# Patient Record
Sex: FEMALE | Race: BLACK OR AFRICAN AMERICAN | Employment: FULL TIME | URBAN - METROPOLITAN AREA
[De-identification: names, ages, dates, MRNs, and addresses within clinical notes are randomized per-mention and may not be internally consistent; named-entity substitution may affect disease eponyms.]

---

## 2017-01-11 ENCOUNTER — OFFICE VISIT (OUTPATIENT)
Dept: FAMILY MEDICINE CLINIC | Age: 22
End: 2017-01-11

## 2017-01-11 VITALS
DIASTOLIC BLOOD PRESSURE: 61 MMHG | OXYGEN SATURATION: 100 % | HEIGHT: 63 IN | HEART RATE: 71 BPM | TEMPERATURE: 97.9 F | BODY MASS INDEX: 18.78 KG/M2 | WEIGHT: 106 LBS | RESPIRATION RATE: 16 BRPM | SYSTOLIC BLOOD PRESSURE: 123 MMHG

## 2017-01-11 DIAGNOSIS — R59.0 ADENOPATHY, CERVICAL: Primary | ICD-10-CM

## 2017-01-11 RX ORDER — SULFAMETHOXAZOLE AND TRIMETHOPRIM 800; 160 MG/1; MG/1
1 TABLET ORAL 2 TIMES DAILY
Qty: 20 TAB | Refills: 0 | Status: SHIPPED | OUTPATIENT
Start: 2017-01-11 | End: 2017-01-21

## 2017-01-11 NOTE — MR AVS SNAPSHOT
Visit Information Date & Time Provider Department Dept. Phone Encounter #  
 1/11/2017  2:15 PM Claude Marrero MD [address] [place] [address] [id number] Follow-up Instructions Return if symptoms worsen or fail to improve. Upcoming Health Maintenance Date Due Hepatitis A Peds Age 1-18 (1 of 2 - Standard Series) 1/29/1997 DTaP/Tdap/Td series (1 - Tdap) 1/29/2003 HPV AGE 9Y-26Y (1 of 3 - Female 3 Dose Series) 1/29/2007 INFLUENZA AGE 9 TO ADULT 8/1/2016 Allergies as of 1/11/2017  Review Complete On: 1/11/2017 By: Mark Garcia LPN Severity Noted Reaction Type Reactions Penicillins  05/06/2015    Hives Current Immunizations  Reviewed on 12/3/2016 No immunizations on file. Not reviewed this visit You Were Diagnosed With   
  
 Codes Comments Adenopathy, cervical    -  Primary ICD-10-CM: R59.0 ICD-9-CM: 923. 6 Vitals BP Pulse Temp Resp Height(growth percentile) Weight(growth percentile) 123/61 (BP 1 Location: Right arm, BP Patient Position: Sitting) 71 97.9 °F (36.6 °C) (Oral) 16 5' 3\" (1.6 m) 106 lb (48.1 kg) LMP SpO2 BMI OB Status Smoking Status 12/20/2016 100% 18.78 kg/m2 Having regular periods Never Smoker BMI and BSA Data Body Mass Index Body Surface Area 18.78 kg/m 2 1.46 m 2 Preferred Pharmacy Pharmacy Name Phone [facility] [address] [place] [address] [phone] Your Updated Medication List  
  
   
This list is accurate as of: 1/11/17  3:17 PM.  Always use your most recent med list.  
  
  
  
  
 trimethoprim-sulfamethoxazole 160-800 mg per tablet Commonly known as:  BACTRIM DS, SEPTRA DS Take 1 Tab by mouth two (2) times a day for 10 days. Prescriptions Sent to Pharmacy  Refills  
 trimethoprim-sulfamethoxazole (BACTRIM DS, SEPTRA DS) 160-800 mg per tablet 0  
 Sig: Take 1 Tab by mouth two (2) times a day for 10 days. Class: Normal  
 Pharmacy: [id number], [address] [place] [address] #: [phone] Route: Oral  
  
Follow-up Instructions Return if symptoms worsen or fail to improve. Introducing [facility]! Dear Robert Ritchie: Thank you for requesting a Zipscene account. Our records indicate that you already have an active Zipscene account. You can access your account anytime at [email or web address] Did you know that you can access your hospital and ER discharge instructions at any time in Zipscene? You can also review all of your test results from your hospital stay or ER visit. Additional Information If you have questions, please visit the Frequently Asked Questions section of the Zipscene website at [email or web address] Remember, Zipscene is NOT to be used for urgent needs. For medical emergencies, dial 911. Now available from your iPhone and Android! Please provide this summary of care documentation to your next provider. Your primary care clinician is listed as Claudy Bravo. If you have any questions after today's visit, please call [phone].

## 2017-01-11 NOTE — PROGRESS NOTES
1. Have you been to the ER, urgent care clinic since your last visit? Hospitalized since your last visit? No    2. Have you seen or consulted any other health care providers outside of the [address] [place] [address] since your last visit? Include any pap smears or colon screening.  No     Chief Complaint   Patient presents with    Mass     on neck left side

## 2017-01-11 NOTE — PROGRESS NOTES
Chief Complaint   Patient presents with    Mass     on neck left side     she is a 22y.o. year old female who presents for evalution. she is here with a new problem. She noted a mass on the left side of neck last night. This hurt off and on and made worse by turning right. Reviewed PmHx, RxHx, FmHx, SocHx, AllgHx and updated and dated in the chart. Patient Active Problem List    Diagnosis    Acute cystitis without hematuria    Hypokalemia    Hypoglycemia    Anxiety       Nurse notes were reviewed and copied and are correct  Review of Systems - negative except as listed above in the HPI    Objective:     Vitals:    01/11/17 1444   BP: 123/61   Pulse: 71   Resp: 16   Temp: 97.9 °F (36.6 °C)   TempSrc: Oral   SpO2: 100%   Weight: 106 lb (48.1 kg)   Height: 5' 3\" (1.6 m)       Physical Examination: General appearance - alert, well appearing, and in no distress  Mental status - alert, oriented to person, place, and time  Eyes - pupils equal and reactive, extraocular eye movements intact  Ears - bilateral TM's and external ear canals normal  Mouth - mucous membranes moist, pharynx normal without lesions  Neck - supple, tender ant cervical lymph node on the left. Skin: few very tiny pin head sized acne pustules on the neck and upper clavicle area    Assessment/ Plan:   Partha Pool was seen today for mass. Diagnoses and all orders for this visit:    Adenopathy, cervical  -     trimethoprim-sulfamethoxazole (BACTRIM DS, SEPTRA DS) 160-800 mg per tablet; Take 1 Tab by mouth two (2) times a day for 10 days. possibly reacting to the pustules in the area. Also possible is a tooth issue althoug they look fine. I suggest she see the dentist soon  Follow-up Disposition:  Return if symptoms worsen or fail to improve. ICD-10-CM ICD-9-CM    1.  Adenopathy, cervical R59.0 785.6 trimethoprim-sulfamethoxazole (BACTRIM DS, SEPTRA DS) 160-800 mg per tablet       I have discussed the diagnosis with the patient and the intended plan as seen in the above orders. The patient has received an after-visit summary and questions were answered concerning future plans. Medication Side Effects and Warnings were discussed with patient: yes  Patient Labs were reviewed and or requested: yes  Patient Past Records were reviewed and or requested: yes        There are no Patient Instructions on file for this visit.     The patient verbalizes understanding and agrees with the plan of care        Patient has the advanced directives booklet to review

## 2017-01-25 ENCOUNTER — HOSPITAL ENCOUNTER (OUTPATIENT)
Dept: ULTRASOUND IMAGING | Age: 22
Discharge: HOME | End: 2017-01-25
Attending: INTERNAL MEDICINE
Payer: COMMERCIAL

## 2017-01-25 DIAGNOSIS — N17.9 ACUTE KIDNEY FAILURE, UNSPECIFIED (HCC): ICD-10-CM

## 2017-01-25 PROCEDURE — 76770 US EXAM ABDO BACK WALL COMP: CPT

## 2017-01-28 ENCOUNTER — APPOINTMENT (OUTPATIENT)
Dept: CT IMAGING | Age: 22
End: 2017-01-28
Attending: PHYSICIAN ASSISTANT
Payer: COMMERCIAL

## 2017-01-28 ENCOUNTER — HOSPITAL ENCOUNTER (EMERGENCY)
Age: 22
Discharge: HOME | End: 2017-01-28
Attending: EMERGENCY MEDICINE | Admitting: EMERGENCY MEDICINE
Payer: COMMERCIAL

## 2017-01-28 VITALS
DIASTOLIC BLOOD PRESSURE: 88 MMHG | TEMPERATURE: 98.1 F | RESPIRATION RATE: 12 BRPM | HEART RATE: 68 BPM | BODY MASS INDEX: 18.52 KG/M2 | SYSTOLIC BLOOD PRESSURE: 141 MMHG | HEIGHT: 63 IN | OXYGEN SATURATION: 100 % | WEIGHT: 104.5 LBS

## 2017-01-28 DIAGNOSIS — G43.109 MIGRAINE WITH AURA AND WITHOUT STATUS MIGRAINOSUS, NOT INTRACTABLE: Primary | ICD-10-CM

## 2017-01-28 LAB
ALBUMIN SERPL BCP-MCNC: 4.2 G/DL (ref 3.5–5)
ALBUMIN/GLOB SERPL: 1.2 {RATIO} (ref 1.1–2.2)
ALP SERPL-CCNC: 31 U/L (ref 45–117)
ALT SERPL-CCNC: 13 U/L (ref 12–78)
ANION GAP BLD CALC-SCNC: 7 MMOL/L (ref 5–15)
AST SERPL W P-5'-P-CCNC: 11 U/L (ref 15–37)
BASOPHILS # BLD AUTO: 0 K/UL (ref 0–0.1)
BASOPHILS # BLD: 1 % (ref 0–1)
BILIRUB SERPL-MCNC: 0.7 MG/DL (ref 0.2–1)
BUN SERPL-MCNC: 9 MG/DL (ref 6–20)
BUN/CREAT SERPL: 11 (ref 12–20)
CALCIUM SERPL-MCNC: 8.8 MG/DL (ref 8.5–10.1)
CHLORIDE SERPL-SCNC: 107 MMOL/L (ref 97–108)
CO2 SERPL-SCNC: 28 MMOL/L (ref 21–32)
CREAT SERPL-MCNC: 0.85 MG/DL (ref 0.55–1.02)
EOSINOPHIL # BLD: 0.1 K/UL (ref 0–0.4)
EOSINOPHIL NFR BLD: 2 % (ref 0–7)
ERYTHROCYTE [DISTWIDTH] IN BLOOD BY AUTOMATED COUNT: 12 % (ref 11.5–14.5)
GLOBULIN SER CALC-MCNC: 3.6 G/DL (ref 2–4)
GLUCOSE SERPL-MCNC: 86 MG/DL (ref 65–100)
HCG UR QL: NEGATIVE
HCT VFR BLD AUTO: 34.3 % (ref 35–47)
HGB BLD-MCNC: 11.8 G/DL (ref 11.5–16)
LYMPHOCYTES # BLD AUTO: 51 % (ref 12–49)
LYMPHOCYTES # BLD: 3.1 K/UL (ref 0.8–3.5)
MCH RBC QN AUTO: 30.6 PG (ref 26–34)
MCHC RBC AUTO-ENTMCNC: 34.4 G/DL (ref 30–36.5)
MCV RBC AUTO: 88.9 FL (ref 80–99)
MONOCYTES # BLD: 0.5 K/UL (ref 0–1)
MONOCYTES NFR BLD AUTO: 8 % (ref 5–13)
NEUTS SEG # BLD: 2.3 K/UL (ref 1.8–8)
NEUTS SEG NFR BLD AUTO: 38 % (ref 32–75)
PLATELET # BLD AUTO: 217 K/UL (ref 150–400)
POTASSIUM SERPL-SCNC: 3.6 MMOL/L (ref 3.5–5.1)
PROT SERPL-MCNC: 7.8 G/DL (ref 6.4–8.2)
RBC # BLD AUTO: 3.86 M/UL (ref 3.8–5.2)
SODIUM SERPL-SCNC: 142 MMOL/L (ref 136–145)
WBC # BLD AUTO: 6 K/UL (ref 3.6–11)

## 2017-01-28 PROCEDURE — 74011250636 HC RX REV CODE- 250/636: Performed by: EMERGENCY MEDICINE

## 2017-01-28 PROCEDURE — 85025 COMPLETE CBC W/AUTO DIFF WBC: CPT | Performed by: PHYSICIAN ASSISTANT

## 2017-01-28 PROCEDURE — 36415 COLL VENOUS BLD VENIPUNCTURE: CPT | Performed by: PHYSICIAN ASSISTANT

## 2017-01-28 PROCEDURE — 96375 TX/PRO/DX INJ NEW DRUG ADDON: CPT

## 2017-01-28 PROCEDURE — 99283 EMERGENCY DEPT VISIT LOW MDM: CPT

## 2017-01-28 PROCEDURE — 80053 COMPREHEN METABOLIC PANEL: CPT | Performed by: PHYSICIAN ASSISTANT

## 2017-01-28 PROCEDURE — 70450 CT HEAD/BRAIN W/O DYE: CPT

## 2017-01-28 PROCEDURE — 96361 HYDRATE IV INFUSION ADD-ON: CPT

## 2017-01-28 PROCEDURE — 96374 THER/PROPH/DIAG INJ IV PUSH: CPT

## 2017-01-28 PROCEDURE — 81025 URINE PREGNANCY TEST: CPT

## 2017-01-28 RX ORDER — KETOROLAC TROMETHAMINE 30 MG/ML
30 INJECTION, SOLUTION INTRAMUSCULAR; INTRAVENOUS
Status: DISCONTINUED | OUTPATIENT
Start: 2017-01-28 | End: 2017-01-28

## 2017-01-28 RX ORDER — PROCHLORPERAZINE EDISYLATE 5 MG/ML
10 INJECTION INTRAMUSCULAR; INTRAVENOUS
Status: COMPLETED | OUTPATIENT
Start: 2017-01-28 | End: 2017-01-28

## 2017-01-28 RX ORDER — DEXAMETHASONE SODIUM PHOSPHATE 100 MG/10ML
10 INJECTION INTRAMUSCULAR; INTRAVENOUS
Status: DISCONTINUED | OUTPATIENT
Start: 2017-01-28 | End: 2017-01-28

## 2017-01-28 RX ORDER — BUTALBITAL, ACETAMINOPHEN AND CAFFEINE 300; 40; 50 MG/1; MG/1; MG/1
1 CAPSULE ORAL
Qty: 30 CAP | Refills: 0 | Status: SHIPPED | OUTPATIENT
Start: 2017-01-28 | End: 2017-07-19

## 2017-01-28 RX ORDER — DIPHENHYDRAMINE HYDROCHLORIDE 50 MG/ML
25 INJECTION, SOLUTION INTRAMUSCULAR; INTRAVENOUS
Status: COMPLETED | OUTPATIENT
Start: 2017-01-28 | End: 2017-01-28

## 2017-01-28 RX ADMIN — DIPHENHYDRAMINE HYDROCHLORIDE 25 MG: 50 INJECTION, SOLUTION INTRAMUSCULAR; INTRAVENOUS at 19:56

## 2017-01-28 RX ADMIN — SODIUM CHLORIDE 1000 ML: 900 INJECTION, SOLUTION INTRAVENOUS at 19:56

## 2017-01-28 RX ADMIN — PROCHLORPERAZINE EDISYLATE 10 MG: 5 INJECTION INTRAMUSCULAR; INTRAVENOUS at 19:56

## 2017-01-29 NOTE — DISCHARGE INSTRUCTIONS
Migraine Headache: Care Instructions  Your Care Instructions  Migraines are painful, throbbing headaches that often start on one side of the head. They may cause nausea and vomiting and make you sensitive to light, sound, or smell. Without treatment, migraines can last from 4 hours to a few days. Medicines can help prevent migraines or stop them after they have started. Your doctor can help you find which ones work best for you. Follow-up care is a key part of your treatment and safety. Be sure to make and go to all appointments, and call your doctor if you are having problems. It's also a good idea to know your test results and keep a list of the medicines you take. How can you care for yourself at home? · Do not drive if you have taken a prescription pain medicine. · Rest in a quiet, dark room until your headache is gone. Close your eyes, and try to relax or go to sleep. Don't watch TV or read. · Put a cold, moist cloth or cold pack on the painful area for 10 to 20 minutes at a time. Put a thin cloth between the cold pack and your skin. · Use a warm, moist towel or a heating pad set on low to relax tight shoulder and neck muscles. · Have someone gently massage your neck and shoulders. · Take your medicines exactly as prescribed. Call your doctor if you think you are having a problem with your medicine. You will get more details on the specific medicines your doctor prescribes. · Be careful not to take pain medicine more often than the instructions allow. You could get worse or more frequent headaches when the medicine wears off. To prevent migraines  · Keep a headache diary so you can figure out what triggers your headaches. Avoiding triggers may help you prevent headaches. Record when each headache began, how long it lasted, and what the pain was like.  (Was it throbbing, aching, stabbing, or dull?) Write down any other symptoms you had with the headache, such as nausea, flashing lights or dark spots, or sensitivity to bright light or loud noise. Note if the headache occurred near your period. List anything that might have triggered the headache. Triggers may include certain foods (chocolate, cheese, wine) or odors, smoke, bright light, stress, or lack of sleep. · If your doctor has prescribed medicine for your migraines, take it as directed. You may have medicine that you take only when you get a migraine and medicine that you take all the time to help prevent migraines. ¨ If your doctor has prescribed medicine for when you get a headache, take it at the first sign of a migraine, unless your doctor has given you other instructions. ¨ If your doctor has prescribed medicine to prevent migraines, take it exactly as prescribed. Call your doctor if you think you are having a problem with your medicine. · Find healthy ways to deal with stress. Migraines are most common during or right after stressful times. Take time to relax before and after you do something that has caused a migraine in the past.  · Try to keep your muscles relaxed by keeping good posture. Check your jaw, face, neck, and shoulder muscles for tension. Try to relax them. When you sit at a desk, change positions often. And make sure to stretch for 30 seconds each hour. · Get plenty of sleep and exercise. · Eat meals on a regular schedule. Avoid foods and drinks that often trigger migraines. These include chocolate, alcohol (especially red wine and port), aspartame, monosodium glutamate (MSG), and some additives found in foods (such as hot dogs, baez, cold cuts, aged cheeses, and pickled foods). · Limit caffeine. Don't drink too much coffee, tea, or soda. But don't quit caffeine suddenly. That can also give you migraines. · Do not smoke or allow others to smoke around you. If you need help quitting, talk to your doctor about stop-smoking programs and medicines. These can increase your chances of quitting for good.   · If you are taking birth control pills or hormone therapy, talk to your doctor about whether they are triggering your migraines. When should you call for help? Call 911 anytime you think you may need emergency care. For example, call if:  · You have signs of a stroke. These may include:  ¨ Sudden numbness, paralysis, or weakness in your face, arm, or leg, especially on only one side of your body. ¨ Sudden vision changes. ¨ Sudden trouble speaking. ¨ Sudden confusion or trouble understanding simple statements. ¨ Sudden problems with walking or balance. ¨ A sudden, severe headache that is different from past headaches. Call your doctor now or seek immediate medical care if:  · You have new or worse nausea and vomiting. · You have a new or higher fever. · Your headache gets much worse. Watch closely for changes in your health, and be sure to contact your doctor if:  · You are not getting better after 2 days (48 hours). Where can you learn more? Go to http://robbie-zehra.info/. Enter G232 in the search box to learn more about \"Migraine Headache: Care Instructions. \"  Current as of: February 19, 2016  Content Version: 11.1  © 6446-5512 [employer]. Care instructions adapted under license by [employer] (which disclaims liability or warranty for this information). If you have questions about a medical condition or this instruction, always ask your healthcare professional. [address] any warranty or liability for your use of this information.

## 2017-01-29 NOTE — ED PROVIDER NOTES
HPI Comments: Kasandra Kennedy is a 22 y.o. female, pmhx significant for hypoglycemia, anxiety, panic disorder, migraines, GERD, hypokalemia, who presents ambulatory to the ED c/o gradually worsening 8/10 L frontal HA with associated lightheadedness, blurry vision of the L eye, photophobia of the L eye, nausea and L sided weakness. Pt describes HA as pulsating and notes that her lightheadedness worsens upon standing. She reports that this HA does not feel similar to ones that she has had in the past and mentions that she has medication for migraines, but has not taken it since the onset of her sxs. Sleep and OTC tylenol partially alleviate the pt's sxs. Pt has been tolerating nl PO throughout the course of her sxs. Pt specifically denies vomiting, fever, rhinorrhea, cough, runny eyes, dysuria, hematuria, phonophobia or seeing a neurologist.     PCP: Claude Marrero MD      Social Hx: -tobacco, -EtOH, -Illicit Drugs   FHx: no pertinent family hx   Medication Allergies: penicillins      There are no other complaints, changes, or physical findings at this time. The history is provided by the patient. Past Medical History:   Diagnosis Date    Endocrine disease      hypoglycemia    Gastrointestinal disorder      GERD    Hypokalemia     Panic attacks     Psychiatric disorder      anxiety, panic disorder       No past surgical history on file. Family History:   Problem Relation Age of Onset   [facility] Migraines Mother        Social History     Social History    Marital status: SINGLE     Spouse name: N/A    Number of children: N/A    Years of education: N/A     Occupational History    Not on file.      Social History Main Topics    Smoking status: Never Smoker    Smokeless tobacco: Never Used    Alcohol use No    Drug use: No    Sexual activity: Yes     Partners: Male     Birth control/ protection: Condom     Other Topics Concern    Not on file     Social History Narrative         ALLERGIES: Penicillins    Review of Systems   Constitutional: Negative for chills and fever. Eyes: Positive for photophobia and visual disturbance (L eye ). Respiratory: Negative for cough and shortness of breath. Cardiovascular: Negative for chest pain. Gastrointestinal: Positive for nausea. Negative for constipation, diarrhea and vomiting. Neurological: Positive for dizziness (lightheaded), weakness (L side) and headaches (L frontal). Negative for numbness. No phonophobia    All other systems reviewed and are negative. Patient Vitals for the past 12 hrs:   Temp Pulse Resp BP SpO2   01/28/17 1848 98.1 °F (36.7 °C) 68 12 141/88 100 %             Physical Exam   Constitutional: She is oriented to person, place, and time. She appears well-developed and well-nourished. HENT:   Head: Normocephalic. Eyes: EOM are normal. Pupils are equal, round, and reactive to light. Neck: Normal range of motion. Cardiovascular: Normal rate and regular rhythm. Pulmonary/Chest: Effort normal and breath sounds normal.   Abdominal: Soft. She exhibits no distension. There is no tenderness. Neurological: She is alert and oriented to person, place, and time. No cranial nerve deficit. CN 2-12 intact, Finger to nose intact, negative Romberg, normal gait  4+ strength LUE   Skin: Skin is warm and dry. Psychiatric: She has a normal mood and affect. Nursing note and vitals reviewed. MDM  Number of Diagnoses or Management Options  Migraine with aura and without status migrainosus, not intractable:   Diagnosis management comments: Patient presents with headache similar to prior migraine headaches in past.  DDx: most likely complex migraine given the weakness of the left side with photophobia, unilateral HA and aura, cluster HA, tension HA. Less likely pseudotumor cerebri, SAH, ICH, cerebral dural venous thrombosis, or meningitis given the course, story and physical exam.  Labs and analgesics and fluids ordered. DDx also includes worsening renal failure. Will get CT head and labwork. The nature of migraine has been discussed. Various episodic and prophylactic choices have been explained. Neurodiagnostic studies have been discussed. Recommendations: lie in darkened room and apply cold packs prn for pain, side effect profile discussed in detail, patient reassured that neurodiagnostic workup not indicated from benign H & P and referral to Neurology. Amount and/or Complexity of Data Reviewed  Clinical lab tests: reviewed and ordered  Tests in the radiology section of CPT®: ordered and reviewed  Review and summarize past medical records: yes  Independent visualization of images, tracings, or specimens: yes    Patient Progress  Patient progress: stable        Procedures   8:46 PM  Pt's HA is down to a 2/10. She notes taking Fioricet before with improvement of sxs. Written by Steph Del Real ED Scribe as dictated by Cirilo Lundy M.D.     LABORATORY TESTS:  Recent Results (from the past 12 hour(s))   CBC WITH AUTOMATED DIFF    Collection Time: 01/28/17  7:59 PM   Result Value Ref Range    WBC 6.0 3.6 - 11.0 K/uL    RBC 3.86 3.80 - 5.20 M/uL    HGB 11.8 11.5 - 16.0 g/dL    HCT 34.3 (L) 35.0 - 47.0 %    MCV 88.9 80.0 - 99.0 FL    MCH 30.6 26.0 - 34.0 PG    MCHC 34.4 30.0 - 36.5 g/dL    RDW 12.0 11.5 - 14.5 %    PLATELET 453 939 - 409 K/uL    NEUTROPHILS 38 32 - 75 %    LYMPHOCYTES 51 (H) 12 - 49 %    MONOCYTES 8 5 - 13 %    EOSINOPHILS 2 0 - 7 %    BASOPHILS 1 0 - 1 %    ABS. NEUTROPHILS 2.3 1.8 - 8.0 K/UL    ABS. LYMPHOCYTES 3.1 0.8 - 3.5 K/UL    ABS. MONOCYTES 0.5 0.0 - 1.0 K/UL    ABS. EOSINOPHILS 0.1 0.0 - 0.4 K/UL    ABS.  BASOPHILS 0.0 0.0 - 0.1 K/UL   METABOLIC PANEL, COMPREHENSIVE    Collection Time: 01/28/17  7:59 PM   Result Value Ref Range    Sodium 142 136 - 145 mmol/L    Potassium 3.6 3.5 - 5.1 mmol/L    Chloride 107 97 - 108 mmol/L    CO2 28 21 - 32 mmol/L    Anion gap 7 5 - 15 mmol/L    Glucose 86 65 - 100 mg/dL    BUN 9 6 - 20 MG/DL    Creatinine 0.85 0.55 - 1.02 MG/DL    BUN/Creatinine ratio 11 (L) 12 - 20      GFR est AA >60 >60 ml/min/1.73m2    GFR est non-AA >60 >60 ml/min/1.73m2    Calcium 8.8 8.5 - 10.1 MG/DL    Bilirubin, total 0.7 0.2 - 1.0 MG/DL    ALT 13 12 - 78 U/L    AST 11 (L) 15 - 37 U/L    Alk. phosphatase 31 (L) 45 - 117 U/L    Protein, total 7.8 6.4 - 8.2 g/dL    Albumin 4.2 3.5 - 5.0 g/dL    Globulin 3.6 2.0 - 4.0 g/dL    A-G Ratio 1.2 1.1 - 2.2     HCG URINE, QL. - POC    Collection Time: 01/28/17  8:01 PM   Result Value Ref Range    Pregnancy test,urine (POC) NEGATIVE  NEG         IMAGING RESULTS:  CT Results  (Last 48 hours)               01/28/17 1931  CT HEAD WO CONT Final result    Impression:  IMPRESSION:        Normal CT scan of the head. Narrative:  EXAM:  CT HEAD WO CONT   Clinical history: Headache           INDICATION:   Headache, acute, severe, thunderclap, worst HA of life       COMPARISON: None. TECHNIQUE: Unenhanced CT of the head was performed using 5 mm images. Brain and   bone windows were generated. CT dose reduction was achieved through use of a   standardized protocol tailored for this examination and automatic exposure   control for dose modulation. FINDINGS:   The ventricles and sulci are normal in size, shape and configuration and   midline. There is no significant white matter disease. There is no intracranial   hemorrhage, extra-axial collection, mass, mass effect or midline shift. The   basilar cisterns are open. No acute infarct is identified. The bone windows   demonstrate no abnormalities. The visualized portions of the paranasal sinuses   and mastoid air cells are clear.                  MEDICATIONS GIVEN:  Medications   sodium chloride 0.9 % bolus infusion 1,000 mL (1,000 mL IntraVENous New Bag 1/28/17 1956)   diphenhydrAMINE (BENADRYL) injection 25 mg (25 mg IntraVENous Given 1/28/17 1956)   prochlorperazine (COMPAZINE) injection 10 mg (10 mg IntraVENous Given 1/28/17 1956)       IMPRESSION:  1. Migraine with aura and without status migrainosus, not intractable        PLAN:  1. Current Discharge Medication List      START taking these medications    Details   butalbital-acetaminophen-caff (FIORICET) -40 mg per capsule Take 1 Cap by mouth every six (6) hours as needed for Pain. Max Daily Amount: 4 Caps. Qty: 30 Cap, Refills: 0           2. Follow-up Information     Follow up With Details Comments [address] Bernardo MD Schedule an appointment as soon as possible for a visit About migraines [id number] [place] [address] [facility]      Zonia Mon MD  As needed [address] [facility]  [phone]          Return to ED if worse   DISCHARGE NOTE:  8:49 PM  The patient's results have been reviewed with family and/or caregiver. They verbally convey their understanding and agreement of the patient's signs, symptoms, diagnosis, treatment, and prognosis. They additionally agree to follow up as recommended in the discharge instructions or to return to the Emergency Room should the patient's condition change prior to their follow-up appointment. The family and/or caregiver verbally agrees with the care-plan and all of their questions have been answered. The discharge instructions have also been provided to the them along with educational information regarding the patient's diagnosis and a list of reasons why the patient would want to return to the ER prior to their follow-up appointment should their condition change. Written by Lana Brooks ED Scribe, as dictated by Quincy Damico M.D. This note is prepared by Lana Brooks acting as scribe for Quincy Damcio M.D. Quincy Damico M.D. : The scribe's documentation has been prepared under my direction and personally reviewed by me in its entirety.  I confirm that the note above accurately reflects all work, treatment, procedures, and medical decision making performed by me.

## 2017-02-06 ENCOUNTER — OFFICE VISIT (OUTPATIENT)
Dept: FAMILY MEDICINE CLINIC | Age: 22
End: 2017-02-06

## 2017-02-06 VITALS
RESPIRATION RATE: 18 BRPM | OXYGEN SATURATION: 100 % | TEMPERATURE: 98.3 F | HEART RATE: 79 BPM | WEIGHT: 108 LBS | HEIGHT: 63 IN | BODY MASS INDEX: 19.14 KG/M2 | SYSTOLIC BLOOD PRESSURE: 126 MMHG | DIASTOLIC BLOOD PRESSURE: 75 MMHG

## 2017-02-06 DIAGNOSIS — K11.1 ENLARGEMENT OF SUBMANDIBULAR GLAND: Primary | ICD-10-CM

## 2017-02-06 NOTE — PROGRESS NOTES
1. Have you been to the ER, urgent care clinic since your last visit? Hospitalized since your last visit? Yes Where: [facility], headache and left sided weakness    2. Have you seen or consulted any other health care providers outside of the [address] [place] [address] since your last visit? Include any pap smears or colon screening.  No     Chief Complaint   Patient presents with    Gland Swelling

## 2017-02-06 NOTE — PROGRESS NOTES
Chief Complaint   Patient presents with    Gland Swelling     she is a 25y.o. year old female who presents for evalution. The left submandibular gland was swollen last month. She took all of the baCTRIM and is here to follow up. The area is much smaller now. She did not see the dentist  Reviewed PmHx, RxHx, FmHx, SocHx, AllgHx and updated and dated in the chart. Patient Active Problem List    Diagnosis    Acute cystitis without hematuria    Hypokalemia    Hypoglycemia    Anxiety       Nurse notes were reviewed and copied and are correct  Review of Systems - negative except as listed above in the HPI    Objective:     Vitals:    02/06/17 1432   BP: 126/75   Pulse: 79   Resp: 18   Temp: 98.3 °F (36.8 °C)   TempSrc: Oral   SpO2: 100%   Weight: 108 lb (49 kg)   Height: 5' 3\" (1.6 m)       Physical Examination: General appearance - alert, well appearing, and in no distress  Mental status - alert, oriented to person, place, and time  Neck - left glan slightly larger than the right, nontender, soft and feels normal in texture      Assessment/ Plan:   Danial Severs was seen today for gland swelling. Diagnoses and all orders for this visit:    Enlargement of submandibular gland     keep watching, rtoif gets large again  Follow-up Disposition:  Return in about 1 week (around 2/13/2017), or if symptoms worsen or fail to improve. ICD-10-CM ICD-9-CM    1. Enlargement of submandibular gland K11.1 527.1        I have discussed the diagnosis with the patient and the intended plan as seen in the above orders. The patient has received an after-visit summary and questions were answered concerning future plans. Medication Side Effects and Warnings were discussed with patient: yes  Patient Labs were reviewed and or requested: yes  Patient Past Records were reviewed and or requested: yes        There are no Patient Instructions on file for this visit.     The patient verbalizes understanding and agrees with the plan of care        Patient has the advanced directives booklet to review

## 2017-02-06 NOTE — MR AVS SNAPSHOT
Visit Information Date & Time Provider Department Dept. Phone Encounter #  
 2/6/2017  2:00 PM Gio Joy MD [address] [place] [address] [id number] Follow-up Instructions Return in about 1 week (around 2/13/2017), or if symptoms worsen or fail to improve. Upcoming Health Maintenance Date Due  
 HPV AGE 9Y-34Y (1 of 3 - Female 3 Dose Series) 1/29/2007 Pneumococcal 19-64 Highest Risk (1 of 3 - PCV13) 1/29/2015 INFLUENZA AGE 9 TO ADULT 8/1/2016 DTaP/Tdap/Td series (1 - Tdap) 1/29/2017 PAP AKA CERVICAL CYTOLOGY 1/29/2017 Allergies as of 2/6/2017  Review Complete On: 2/6/2017 By: Maribel Franco LPN Severity Noted Reaction Type Reactions Penicillins  05/06/2015    Hives Current Immunizations  Reviewed on 12/3/2016 No immunizations on file. Not reviewed this visit You Were Diagnosed With   
  
 Codes Comments Enlargement of submandibular gland    -  Primary ICD-10-CM: K11.1 ICD-9-CM: 527.1 Vitals BP Pulse Temp Resp Height(growth percentile) Weight(growth percentile) 126/75 79 98.3 °F (36.8 °C) (Oral) 18 5' 3\" (1.6 m) 108 lb (49 kg) LMP SpO2 BMI OB Status Smoking Status 01/21/2017 100% 19.13 kg/m2 Having regular periods Never Smoker Vitals History BMI and BSA Data Body Mass Index Body Surface Area  
 19.13 kg/m 2 1.48 m 2 Preferred Pharmacy Pharmacy Name Phone [facility] [address] - [place]be, [address] [phone] Your Updated Medication List  
  
   
This list is accurate as of: 2/6/17  3:13 PM.  Always use your most recent med list.  
  
  
  
  
 butalbital-acetaminophen-caff -40 mg per capsule Commonly known as:  [employer] Take 1 Cap by mouth every six (6) hours as needed for Pain. Max Daily Amount: 4 Caps. Follow-up Instructions  Return in about 1 week (around 2/13/2017), or if symptoms worsen or fail to improve. Introducing [facility]! Dear Nohelia Díaz: Thank you for requesting a Orugga account. Our records indicate that you already have an active Orugga account. You can access your account anytime at [email or web address] Did you know that you can access your hospital and ER discharge instructions at any time in Orugga? You can also review all of your test results from your hospital stay or ER visit. Additional Information If you have questions, please visit the Frequently Asked Questions section of the Orugga website at [email or web address] Remember, Orugga is NOT to be used for urgent needs. For medical emergencies, dial 911. Now available from your iPhone and Android! Please provide this summary of care documentation to your next provider. Your primary care clinician is listed as Ramiro Harrington. If you have any questions after today's visit, please call [phone].

## 2017-04-17 ENCOUNTER — OFFICE VISIT (OUTPATIENT)
Dept: FAMILY MEDICINE CLINIC | Age: 22
End: 2017-04-17

## 2017-04-17 VITALS
OXYGEN SATURATION: 99 % | WEIGHT: 106 LBS | HEART RATE: 73 BPM | SYSTOLIC BLOOD PRESSURE: 123 MMHG | HEIGHT: 63 IN | DIASTOLIC BLOOD PRESSURE: 74 MMHG | BODY MASS INDEX: 18.78 KG/M2 | TEMPERATURE: 98.8 F | RESPIRATION RATE: 20 BRPM

## 2017-04-17 DIAGNOSIS — L03.031 PARONYCHIA OF GREAT TOE OF RIGHT FOOT: ICD-10-CM

## 2017-04-17 DIAGNOSIS — N92.6 MISSED MENSES: ICD-10-CM

## 2017-04-17 DIAGNOSIS — S99.921A INJURY OF TOENAIL OF RIGHT FOOT, INITIAL ENCOUNTER: ICD-10-CM

## 2017-04-17 DIAGNOSIS — M79.674 GREAT TOE PAIN, RIGHT: Primary | ICD-10-CM

## 2017-04-17 LAB
HCG URINE, QL. (POC): NEGATIVE
VALID INTERNAL CONTROL?: YES

## 2017-04-17 RX ORDER — IBUPROFEN 800 MG/1
800 TABLET ORAL
Qty: 30 TAB | Refills: 0 | Status: SHIPPED | OUTPATIENT
Start: 2017-04-17 | End: 2017-07-19

## 2017-04-17 RX ORDER — DOXYCYCLINE 100 MG/1
100 CAPSULE ORAL 2 TIMES DAILY
Qty: 20 CAP | Refills: 0 | Status: SHIPPED | OUTPATIENT
Start: 2017-04-17 | End: 2017-04-27

## 2017-04-17 NOTE — PATIENT INSTRUCTIONS
Paronychia: Care Instructions  Your Care Instructions  Paronychia (say \"wgzn-sl-IS-teresa-uh\") is an infection of the skin around a fingernail or toenail. It happens when germs enter through a break in the skin. The doctor may have made a small cut in the infected area to drain the pus. Most cases of paronychia improve in a few days. But watch your symptoms and follow your doctor's advice. Though rare, a mild case can turn into something more serious and infect your entire finger or toe. Also, it is possible for an infection to return. Follow-up care is a key part of your treatment and safety. Be sure to make and go to all appointments, and call your doctor if you are having problems. It's also a good idea to know your test results and keep a list of the medicines you take. How can you care for yourself at home? · If your doctor told you how to care for your infected nail, follow the doctor's instructions. If you did not get instructions, follow this general advice:  ¨ Wash the area with clean water 2 times a day. Don't use hydrogen peroxide or alcohol, which can slow healing. ¨ You may cover the area with a thin layer of petroleum jelly, such as Vaseline, and a nonstick bandage. ¨ Apply more petroleum jelly and replace the bandage as needed. · If your doctor prescribed antibiotics, take them as directed. Do not stop taking them just because you feel better. You need to take the full course of antibiotics. · Take an over-the-counter pain medicine, such as acetaminophen (Tylenol), ibuprofen (Advil, Motrin), or naproxen (Aleve). Read and follow all instructions on the label. · Do not take two or more pain medicines at the same time unless the doctor told you to. Many pain medicines have acetaminophen, which is Tylenol. Too much acetaminophen (Tylenol) can be harmful. · Prop up the toe or finger so that it is higher than the level of your heart. This will help with pain and swelling. · Apply heat.  Put a warm water bottle, heating pad set on low, or warm cloth on your finger or toe. Do not go to sleep with a heating pad on your skin. · Soak the area in warm water twice a day for 15 minutes each time. After soaking, dry the area well and apply a thin layer of petroleum jelly, such as Vaseline. Put on a new bandage. When should you call for help? Call your doctor now or seek immediate medical care if:  · You have signs of new or worsening infection, such as:  ¨ Increased pain, swelling, warmth, or redness. ¨ Red streaks leading from the infected skin. ¨ Pus draining from the area. ¨ A fever. Watch closely for changes in your health, and be sure to contact your doctor if:  · You develop joint aches with your infection. · Your infection comes back. · You do not get better as expected. Where can you learn more? Go to http://robbie-zehra.info/. Enter C435 in the search box to learn more about \"Paronychia: Care Instructions. \"  Current as of: October 13, 2016  Content Version: 11.2  © 5021-2181 [employer] Solutions. Care instructions adapted under license by MATIvision (which disclaims liability or warranty for this information). If you have questions about a medical condition or this instruction, always ask your healthcare professional. [place] [address] any warranty or liability for your use of this information. Toenail or Fingernail Avulsion: Care Instructions  Your Care Instructions  Losing a toenail or fingernail because of an injury is called avulsion. The nail may be completely or partially torn off after a trauma to the area. Your doctor may have removed the nail, put part of it back into place, or repaired the nail bed. Your toe or finger may be sore after treatment. You may have stitches. You may have some swelling, color changes, and bloody crusting on or around the wound for 2 or 3 days.  This is normal. Taking good care of your wound at home will help it heal quickly and reduce your chance of infection. The wound should heal within a few weeks. If completely removed, fingernails may take 6 months to grow back. Toenails may take 12 to 18 months to grow back. Injured nails may look different when they grow back. Follow-up care is a key part of your treatment and safety. Be sure to make and go to all appointments, and call your doctor if you are having problems. It's also a good idea to know your test results and keep a list of the medicines you take. How can you care for yourself at home? · If possible, prop up the injured area on a pillow anytime you sit or lie down during the next 3 days. Try to keep it above the level of your heart. This will help reduce swelling. · Leave the bandage on, and if you have stitches, do not get them wet for the first 24 to 48 hours. Use a plastic bag to cover the area when you shower. · If your doctor told you how to care for your wound, follow your doctor's instructions. If you did not get instructions, follow this general advice:  ¨ After the first 24 to 48 hours, you can remove the bandage and gently wash around the wound with clean water 2 times a day. If the bandage sticks to the wound, use warm water to loosen it. Do not scrub or soak the area. ¨ You may cover the wound with a thin layer of petroleum jelly, such as Vaseline, and a nonstick bandage. ¨ Apply more petroleum jelly and replace the bandage as needed. · Do not go swimming. · If you have stitches, do not remove them on your own. Your doctor will tell you when to return to have the stitches removed. · Be safe with medicines. Take pain medicines exactly as directed. ¨ If the doctor gave you a prescription medicine for pain, take it as prescribed. ¨ If you are not taking a prescription pain medicine, ask your doctor if you can take an over-the-counter medicine. · If your doctor prescribed antibiotics, take them as directed.  Do not stop taking them just because you feel better. You need to take the full course of antibiotics. When should you call for help? Call your doctor now or seek immediate medical care if:  · The skin near the wound is cool or pale or changes color. · The wound starts to bleed, and blood soaks through the bandage. Oozing small amounts of blood is normal.  · You have signs of infection, such as:  ¨ Increased pain, swelling, warmth, or redness. ¨ Red streaks leading from your toe or finger. ¨ Pus draining from your toe or finger. ¨ Swollen lymph nodes in your neck, armpits, or groin. ¨ A fever. Watch closely for changes in your health, and be sure to contact your doctor if:  · You have problems with the nail as it grows back. · You do not get better as expected. Where can you learn more? Go to http://robbie-zehra.info/. Enter R293 in the search box to learn more about \"Toenail or Fingernail Avulsion: Care Instructions. \"  Current as of: October 13, 2016  Content Version: 11.2  © 9861-8912 Octopart. Care instructions adapted under license by [email or web address] (which disclaims liability or warranty for this information). If you have questions about a medical condition or this instruction, always ask your healthcare professional. [place] [address] any warranty or liability for your use of this information.

## 2017-04-17 NOTE — MR AVS SNAPSHOT
Visit Information Date & Time Provider Department Dept. Phone Encounter #  
 4/17/2017  2:30 PM Silvia Magaña  [facility] [phone] [id number] Follow-up Instructions Return if symptoms worsen or fail to improve. Upcoming Health Maintenance Date Due  
 HPV AGE 9Y-34Y (1 of 3 - Female 3 Dose Series) 1/29/2007 Pneumococcal 19-64 Highest Risk (1 of 3 - PCV13) 1/29/2015 INFLUENZA AGE 9 TO ADULT 8/1/2016 DTaP/Tdap/Td series (1 - Tdap) 1/29/2017 PAP AKA CERVICAL CYTOLOGY 1/29/2017 Allergies as of 4/17/2017  Review Complete On: 4/17/2017 By: Silvia Magaña NP Severity Noted Reaction Type Reactions Penicillins  05/06/2015    Hives Current Immunizations  Reviewed on 12/3/2016 No immunizations on file. Not reviewed this visit You Were Diagnosed With   
  
 Codes Comments Great toe pain, right    -  Primary ICD-10-CM: W15.176 ICD-9-CM: 729.5 Paronychia of great toe of right foot     ICD-10-CM: L03.031 
ICD-9-CM: 681.11 Injury of toenail of right foot, initial encounter     ICD-10-CM: F65.373X ICD-9-CM: 959.7 Missed menses     ICD-10-CM: N92.6 ICD-9-CM: 626.4 Vitals BP Pulse Temp Resp Height(growth percentile) Weight(growth percentile) 123/74 (BP 1 Location: Right arm, BP Patient Position: Sitting) 73 98.8 °F (37.1 °C) (Oral) 20 5' 3\" (1.6 m) 106 lb (48.1 kg) LMP SpO2 BMI OB Status Smoking Status 03/10/2017 99% 18.78 kg/m2 Having regular periods Never Smoker Vitals History BMI and BSA Data Body Mass Index Body Surface Area 18.78 kg/m 2 1.46 m 2 Preferred Pharmacy [facility] Name Phone [facility] [address] [phone] Your Updated Medication List  
  
   
This list is accurate as of: 4/17/17  2:48 PM.  Always use your most recent med list.  
  
  
  
  
 butalbital-acetaminophen-caff -40 mg per capsule Commonly known as:  [employer] Take 1 Cap by mouth every six (6) hours as needed for Pain. Max Daily Amount: 4 Caps. doxycycline 100 mg capsule Commonly known as:  VIBRAMYCIN Take 1 Cap by mouth two (2) times a day for 10 days. ibuprofen 800 mg tablet Commonly known as:  MOTRIN Take 1 Tab by mouth every eight (8) hours as needed for Pain. Prescriptions Sent to Pharmacy Refills  
 doxycycline (VIBRAMYCIN) 100 mg capsule 0 Sig: Take 1 Cap by mouth two (2) times a day for 10 days. Class: Normal  
 Pharmacy: [address] [place] [address] Ph #: [phone] Route: Oral  
 ibuprofen (MOTRIN) 800 mg tablet 0 Sig: Take 1 Tab by mouth every eight (8) hours as needed for Pain. Class: Normal  
 Pharmacy: [address] [place] [address] Ph #: [phone] Route: Oral  
  
We Performed the Following AMB POC URINE PREGNANCY TEST, VISUAL COLOR COMPARISON [23034 CPT(R)] REFERRAL TO PODIATRY [REF90 Custom] Comments:  
 Nail injury r great toe Follow-up Instructions Return if symptoms worsen or fail to improve. Referral Information Referral ID Referred By Referred To  
  
 [id number] [facility] Visits Status Start Date End Date 1 New Request 4/17/17 4/17/18 If your referral has a status of pending review or denied, additional information will be sent to support the outcome of this decision. Patient Instructions Paronychia: Care Instructions Your Care Instructions Paronychia (say \"txsu-bu-WZ-teresa-uh\") is an infection of the skin around a fingernail or toenail. It happens when germs enter through a break in the skin. The doctor may have made a small cut in the infected area to drain the pus. Most cases of paronychia improve in a few days. But watch your symptoms and follow your doctor's advice. Though rare, a mild case can turn into something more serious and infect your entire finger or toe. Also, it is possible for an infection to return. Follow-up care is a key part of your treatment and safety. Be sure to make and go to all appointments, and call your doctor if you are having problems. It's also a good idea to know your test results and keep a list of the medicines you take. How can you care for yourself at home? · If your doctor told you how to care for your infected nail, follow the doctor's instructions. If you did not get instructions, follow this general advice: ¨ Wash the area with clean water 2 times a day. Don't use hydrogen peroxide or alcohol, which can slow healing. ¨ You may cover the area with a thin layer of petroleum jelly, such as Vaseline, and a nonstick bandage. ¨ Apply more petroleum jelly and replace the bandage as needed. · If your doctor prescribed antibiotics, take them as directed. Do not stop taking them just because you feel better. You need to take the full course of antibiotics. · Take an over-the-counter pain medicine, such as acetaminophen (Tylenol), ibuprofen (Advil, Motrin), or naproxen (Aleve). Read and follow all instructions on the label. · Do not take two or more pain medicines at the same time unless the doctor told you to. Many pain medicines have acetaminophen, which is Tylenol. Too much acetaminophen (Tylenol) can be harmful. · Prop up the toe or finger so that it is higher than the level of your heart. This will help with pain and swelling. · Apply heat. Put a warm water bottle, heating pad set on low, or warm cloth on your finger or toe. Do not go to sleep with a heating pad on your skin. · Soak the area in warm water twice a day for 15 minutes each time.  After soaking, dry the area well and apply a thin layer of petroleum jelly, such as Vaseline. Put on a new bandage. When should you call for help? Call your doctor now or seek immediate medical care if: 
· You have signs of new or worsening infection, such as: 
¨ Increased pain, swelling, warmth, or redness. ¨ Red streaks leading from the infected skin. ¨ Pus draining from the area. ¨ A fever. Watch closely for changes in your health, and be sure to contact your doctor if: 
· You develop joint aches with your infection. · Your infection comes back. · You do not get better as expected. Where can you learn more? Go to http://robbie-zehra.info/. Enter C435 in the search box to learn more about \"Paronychia: Care Instructions. \" Current as of: October 13, 2016 Content Version: 11.2 © 9619-3057 [employer]OXIMITY. Care instructions adapted under license by Dataupia (which disclaims liability or warranty for this information). If you have questions about a medical condition or this instruction, always ask your healthcare professional. [place] [address] any warranty or liability for your use of this information. Toenail or Fingernail Avulsion: Care Instructions Your Care Instructions Losing a toenail or fingernail because of an injury is called avulsion. The nail may be completely or partially torn off after a trauma to the area. Your doctor may have removed the nail, put part of it back into place, or repaired the nail bed. Your toe or finger may be sore after treatment. You may have stitches. You may have some swelling, color changes, and bloody crusting on or around the wound for 2 or 3 days. This is normal. Taking good care of your wound at home will help it heal quickly and reduce your chance of infection. The wound should heal within a few weeks. If completely removed, fingernails may take 6 months to grow back. Toenails may take 12 to 18 months to grow back. Injured nails may look different when they grow back. Follow-up care is a key part of your treatment and safety. Be sure to make and go to all appointments, and call your doctor if you are having problems. It's also a good idea to know your test results and keep a list of the medicines you take. How can you care for yourself at home? · If possible, prop up the injured area on a pillow anytime you sit or lie down during the next 3 days. Try to keep it above the level of your heart. This will help reduce swelling. · Leave the bandage on, and if you have stitches, do not get them wet for the first 24 to 48 hours. Use a plastic bag to cover the area when you shower. · If your doctor told you how to care for your wound, follow your doctor's instructions. If you did not get instructions, follow this general advice: ¨ After the first 24 to 48 hours, you can remove the bandage and gently wash around the wound with clean water 2 times a day. If the bandage sticks to the wound, use warm water to loosen it. Do not scrub or soak the area. ¨ You may cover the wound with a thin layer of petroleum jelly, such as Vaseline, and a nonstick bandage. ¨ Apply more petroleum jelly and replace the bandage as needed. · Do not go swimming. · If you have stitches, do not remove them on your own. Your doctor will tell you when to return to have the stitches removed. · Be safe with medicines. Take pain medicines exactly as directed. ¨ If the doctor gave you a prescription medicine for pain, take it as prescribed. ¨ If you are not taking a prescription pain medicine, ask your doctor if you can take an over-the-counter medicine. · If your doctor prescribed antibiotics, take them as directed. Do not stop taking them just because you feel better. You need to take the full course of antibiotics. When should you call for help? Call your doctor now or seek immediate medical care if: · The skin near the wound is cool or pale or changes color. · The wound starts to bleed, and blood soaks through the bandage. Oozing small amounts of blood is normal. 
· You have signs of infection, such as: 
¨ Increased pain, swelling, warmth, or redness. ¨ Red streaks leading from your toe or finger. ¨ Pus draining from your toe or finger. ¨ Swollen lymph nodes in your neck, armpits, or groin. ¨ A fever. Watch closely for changes in your health, and be sure to contact your doctor if: 
· You have problems with the nail as it grows back. · You do not get better as expected. Where can you learn more? Go to http://robbie-zehra.info/. Enter V767 in the search box to learn more about \"Toenail or Fingernail Avulsion: Care Instructions. \" Current as of: October 13, 2016 Content Version: 11.2 © 3986-0803 Power Liens. Care instructions adapted under license by Stylenda (which disclaims liability or warranty for this information). If you have questions about a medical condition or this instruction, always ask your healthcare professional. [address] any warranty or liability for your use of this information. Introducing [facility]! Dear Monica Lopez: Thank you for requesting a CarePoint Partners account. Our records indicate that you already have an active CarePoint Partners account. You can access your account anytime at [email or web address]. "LendKey Technologies[employer]."/OnCorp Direct Did you know that you can access your hospital and ER discharge instructions at any time in CarePoint Partners? You can also review all of your test results from your hospital stay or ER visit. Additional Information If you have questions, please visit the Frequently Asked Questions section of the CarePoint Partners website at [email or web address]. "[employer]/OnCorp Direct/. Remember, CarePoint Partners is NOT to be used for urgent needs. For medical emergencies, dial 911. Now available from your iPhone and Android! Please provide this summary of care documentation to your next provider. Your primary care clinician is listed as Berna Steiner. If you have any questions after today's visit, please call [phone].

## 2017-04-17 NOTE — PROGRESS NOTES
Chief Complaint   Patient presents with    Nail Problem     C/o right toe nail falling off, drainage, swelling & pain      1. Have you been to the ER, urgent care clinic since your last visit? Hospitalized since your last visit? no    2. Have you seen or consulted any other health care providers outside of the [address] [place] [address] since your last visit? Include any pap smears or colon screening.  no

## 2017-04-17 NOTE — PROGRESS NOTES
Maurilio Myers is a 25 y.o. female who presents with the following complaints:  Chief Complaint   Patient presents with    Nail Problem     C/o right toe nail falling off, drainage, swelling & pain     Missed Menses     Patient would like a pregnancy test due to late cycle        Subjective:    HPI:   C/o 1 week hx of pain right great toe nail, with drainage, swelling. When she noted the pain, she pressed on the nail and pulled on the loose lateral edge, and subsequently noted the malodorous drainage. The pain did improve after the drainage came out. No fever or chills. She had an injury to the nail about 4 months ago, hit toe on a metal cart. The nail turned black and she assumed it would grow out until her current symptoms started. Reports she is 3 days late for her period. Usually uses condoms, did not on at least 1 occasion in the past month, but used plan B after the incident. Pertinent PMH/FH/SH:  Past Medical History:   Diagnosis Date    Endocrine disease     hypoglycemia    Gastrointestinal disorder     GERD    Hypokalemia     Panic attacks     Psychiatric disorder     anxiety, panic disorder     History reviewed. No pertinent surgical history.   Family History   Problem Relation Age of Onset    Migraines Mother      Social History     Social History    Marital status: SINGLE     Spouse name: N/A    Number of children: N/A    Years of education: N/A     Social History Main Topics    Smoking status: Never Smoker    Smokeless tobacco: Never Used    Alcohol use No    Drug use: No    Sexual activity: Yes     Partners: Male     Birth control/ protection: Condom     Other Topics Concern    None     Social History Narrative     Advanced Directives: N      Patient Active Problem List    Diagnosis    Acute cystitis without hematuria    Hypokalemia    Hypoglycemia    Anxiety       Nurse notes were reviewed and are correct  Review of Systems - negative except as listed above in the HPI    Objective:     Vitals:    04/17/17 1423   BP: 123/74   Pulse: 73   Resp: 20   Temp: 98.8 °F (37.1 °C)   TempSrc: Oral   SpO2: 99%   Weight: 106 lb (48.1 kg)   Height: 5' 3\" (1.6 m)     Physical Examination: General appearance - alert, well appearing, and in no distress, oriented to person, place, and time and normal appearing weight  Mental status - normal mood, behavior, speech, dress, motor activity, and thought processes  Neck - supple, no significant adenopathy  Chest - clear to auscultation, no wheezes, rales or rhonchi, symmetric air entry  Heart - normal rate, regular rhythm, normal S1, S2, no murmurs, rubs, clicks or gallops  Neurological - alert, oriented, normal speech, no focal findings or movement disorder noted  Extremities - peripheral pulses normal, no pedal edema, no clubbing or cyanosis; right great toe with partially avulsed nail, toe tender to palpation; no drainage, odor, or erythema noted. Portion of nail bed is blackened. Pain with manipulation of nail. Skin - normal coloration and turgor, no rashes, no suspicious skin lesions noted    Results for orders placed or performed in visit on 04/17/17   AMB POC URINE PREGNANCY TEST, VISUAL COLOR COMPARISON   Result Value Ref Range    VALID INTERNAL CONTROL POC Yes     HCG urine, Ql. (POC) Negative Negative         Assessment/ Plan:   Alberto Mervinjorge l was seen today for nail problem and missed menses. Diagnoses and all orders for this visit:    Great toe pain, right  Paronychia of great toe of right foot  Injury of toenail of right foot, initial encounter  Add Rx  Warm water/Epsom salt soaks BID  To podiatry for further eval/possible removal of nail.  -     doxycycline (VIBRAMYCIN) 100 mg capsule; Take 1 Cap by mouth two (2) times a day for 10 days. -     ibuprofen (MOTRIN) 800 mg tablet;  Take 1 Tab by mouth every eight (8) hours as needed for Pain.  -     REFERRAL TO PODIATRY    Missed menses  Negative ur preg test today  Recommend repeating test if no menses in 1 week  Recommend f/u with her gyn to discuss contraceptive options if she is not intending to become pregnant  -     AMB POC URINE PREGNANCY TEST, VISUAL COLOR COMPARISON      Follow-up Disposition:  Return if symptoms worsen or fail to improve. I have discussed the diagnosis with the patient and the intended plan as seen in the above orders. The patient has received an after-visit summary and questions were answered concerning future plans. The patient verbalizes understanding. Medication Side Effects and Warnings were discussed with patient: yes  Patient Labs were reviewed and or requested: yes  Patient Past Records were reviewed and or requested: yes    Patient Instructions        Paronychia: Care Instructions  Your Care Instructions  Paronychia (say \"mied-vm-XH-teresa-uh\") is an infection of the skin around a fingernail or toenail. It happens when germs enter through a break in the skin. The doctor may have made a small cut in the infected area to drain the pus. Most cases of paronychia improve in a few days. But watch your symptoms and follow your doctor's advice. Though rare, a mild case can turn into something more serious and infect your entire finger or toe. Also, it is possible for an infection to return. Follow-up care is a key part of your treatment and safety. Be sure to make and go to all appointments, and call your doctor if you are having problems. It's also a good idea to know your test results and keep a list of the medicines you take. How can you care for yourself at home? · If your doctor told you how to care for your infected nail, follow the doctor's instructions. If you did not get instructions, follow this general advice:  ¨ Wash the area with clean water 2 times a day. Don't use hydrogen peroxide or alcohol, which can slow healing. ¨ You may cover the area with a thin layer of petroleum jelly, such as Vaseline, and a nonstick bandage.   ¨ Apply more petroleum jelly and replace the bandage as needed. · If your doctor prescribed antibiotics, take them as directed. Do not stop taking them just because you feel better. You need to take the full course of antibiotics. · Take an over-the-counter pain medicine, such as acetaminophen (Tylenol), ibuprofen (Advil, Motrin), or naproxen (Aleve). Read and follow all instructions on the label. · Do not take two or more pain medicines at the same time unless the doctor told you to. Many pain medicines have acetaminophen, which is Tylenol. Too much acetaminophen (Tylenol) can be harmful. · Prop up the toe or finger so that it is higher than the level of your heart. This will help with pain and swelling. · Apply heat. Put a warm water bottle, heating pad set on low, or warm cloth on your finger or toe. Do not go to sleep with a heating pad on your skin. · Soak the area in warm water twice a day for 15 minutes each time. After soaking, dry the area well and apply a thin layer of petroleum jelly, such as Vaseline. Put on a new bandage. When should you call for help? Call your doctor now or seek immediate medical care if:  · You have signs of new or worsening infection, such as:  ¨ Increased pain, swelling, warmth, or redness. ¨ Red streaks leading from the infected skin. ¨ Pus draining from the area. ¨ A fever. Watch closely for changes in your health, and be sure to contact your doctor if:  · You develop joint aches with your infection. · Your infection comes back. · You do not get better as expected. Where can you learn more? Go to http://robbie-zehra.info/. Enter C435 in the search box to learn more about \"Paronychia: Care Instructions. \"  Current as of: October 13, 2016  Content Version: 11.2  © 1794-2934 [employer]. Care instructions adapted under license by [employer] (which disclaims liability or warranty for this information).  If you have questions about a medical condition or this instruction, always ask your healthcare professional. [address] any warranty or liability for your use of this information. Toenail or Fingernail Avulsion: Care Instructions  Your Care Instructions  Losing a toenail or fingernail because of an injury is called avulsion. The nail may be completely or partially torn off after a trauma to the area. Your doctor may have removed the nail, put part of it back into place, or repaired the nail bed. Your toe or finger may be sore after treatment. You may have stitches. You may have some swelling, color changes, and bloody crusting on or around the wound for 2 or 3 days. This is normal. Taking good care of your wound at home will help it heal quickly and reduce your chance of infection. The wound should heal within a few weeks. If completely removed, fingernails may take 6 months to grow back. Toenails may take 12 to 18 months to grow back. Injured nails may look different when they grow back. Follow-up care is a key part of your treatment and safety. Be sure to make and go to all appointments, and call your doctor if you are having problems. It's also a good idea to know your test results and keep a list of the medicines you take. How can you care for yourself at home? · If possible, prop up the injured area on a pillow anytime you sit or lie down during the next 3 days. Try to keep it above the level of your heart. This will help reduce swelling. · Leave the bandage on, and if you have stitches, do not get them wet for the first 24 to 48 hours. Use a plastic bag to cover the area when you shower. · If your doctor told you how to care for your wound, follow your doctor's instructions. If you did not get instructions, follow this general advice:  ¨ After the first 24 to 48 hours, you can remove the bandage and gently wash around the wound with clean water 2 times a day.  If the bandage sticks to the wound, use warm water to loosen it. Do not scrub or soak the area. ¨ You may cover the wound with a thin layer of petroleum jelly, such as Vaseline, and a nonstick bandage. ¨ Apply more petroleum jelly and replace the bandage as needed. · Do not go swimming. · If you have stitches, do not remove them on your own. Your doctor will tell you when to return to have the stitches removed. · Be safe with medicines. Take pain medicines exactly as directed. ¨ If the doctor gave you a prescription medicine for pain, take it as prescribed. ¨ If you are not taking a prescription pain medicine, ask your doctor if you can take an over-the-counter medicine. · If your doctor prescribed antibiotics, take them as directed. Do not stop taking them just because you feel better. You need to take the full course of antibiotics. When should you call for help? Call your doctor now or seek immediate medical care if:  · The skin near the wound is cool or pale or changes color. · The wound starts to bleed, and blood soaks through the bandage. Oozing small amounts of blood is normal.  · You have signs of infection, such as:  ¨ Increased pain, swelling, warmth, or redness. ¨ Red streaks leading from your toe or finger. ¨ Pus draining from your toe or finger. ¨ Swollen lymph nodes in your neck, armpits, or groin. ¨ A fever. Watch closely for changes in your health, and be sure to contact your doctor if:  · You have problems with the nail as it grows back. · You do not get better as expected. Where can you learn more? Go to http://robbie-zehra.info/. Enter Z134 in the search box to learn more about \"Toenail or Fingernail Avulsion: Care Instructions. \"  Current as of: October 13, 2016  Content Version: 11.2  © 0506-7808 [employer]. Care instructions adapted under license by [employer] (which disclaims liability or warranty for this information).  If you have questions about a medical condition or this instruction, always ask your healthcare professional. [place] [address] any warranty or liability for your use of this information.           Migdalia DIAZ

## 2017-04-24 ENCOUNTER — HOSPITAL ENCOUNTER (EMERGENCY)
Age: 22
Discharge: HOME | End: 2017-04-25
Attending: EMERGENCY MEDICINE | Admitting: EMERGENCY MEDICINE
Payer: COMMERCIAL

## 2017-04-24 DIAGNOSIS — N93.9 VAGINAL BLEEDING: ICD-10-CM

## 2017-04-24 DIAGNOSIS — O20.0 THREATENED MISCARRIAGE: ICD-10-CM

## 2017-04-24 DIAGNOSIS — E34.9 ELEVATED SERUM HCG: Primary | ICD-10-CM

## 2017-04-24 PROCEDURE — 81001 URINALYSIS AUTO W/SCOPE: CPT | Performed by: EMERGENCY MEDICINE

## 2017-04-24 PROCEDURE — 81025 URINE PREGNANCY TEST: CPT | Performed by: EMERGENCY MEDICINE

## 2017-04-24 PROCEDURE — 99285 EMERGENCY DEPT VISIT HI MDM: CPT

## 2017-04-24 PROCEDURE — 87086 URINE CULTURE/COLONY COUNT: CPT | Performed by: EMERGENCY MEDICINE

## 2017-04-25 VITALS
BODY MASS INDEX: 18.75 KG/M2 | WEIGHT: 105.82 LBS | RESPIRATION RATE: 20 BRPM | SYSTOLIC BLOOD PRESSURE: 114 MMHG | DIASTOLIC BLOOD PRESSURE: 60 MMHG | HEART RATE: 72 BPM | OXYGEN SATURATION: 100 % | HEIGHT: 63 IN | TEMPERATURE: 98.5 F

## 2017-04-25 LAB
ALBUMIN SERPL BCP-MCNC: 4.3 G/DL (ref 3.5–5)
ALBUMIN/GLOB SERPL: 1.2 {RATIO} (ref 1.1–2.2)
ALP SERPL-CCNC: 30 U/L (ref 45–117)
ALT SERPL-CCNC: 17 U/L (ref 12–78)
ANION GAP BLD CALC-SCNC: 8 MMOL/L (ref 5–15)
APPEARANCE UR: CLEAR
AST SERPL W P-5'-P-CCNC: 10 U/L (ref 15–37)
BACTERIA URNS QL MICRO: NEGATIVE /HPF
BASOPHILS # BLD AUTO: 0 K/UL (ref 0–0.1)
BASOPHILS # BLD: 0 % (ref 0–1)
BILIRUB SERPL-MCNC: 0.7 MG/DL (ref 0.2–1)
BILIRUB UR QL: NEGATIVE
BUN SERPL-MCNC: 8 MG/DL (ref 6–20)
BUN/CREAT SERPL: 10 (ref 12–20)
CALCIUM SERPL-MCNC: 9 MG/DL (ref 8.5–10.1)
CHLORIDE SERPL-SCNC: 108 MMOL/L (ref 97–108)
CLUE CELLS VAG QL WET PREP: NORMAL
CO2 SERPL-SCNC: 25 MMOL/L (ref 21–32)
COLOR UR: ABNORMAL
CREAT SERPL-MCNC: 0.8 MG/DL (ref 0.55–1.02)
EOSINOPHIL # BLD: 0.1 K/UL (ref 0–0.4)
EOSINOPHIL NFR BLD: 1 % (ref 0–7)
EPITH CASTS URNS QL MICRO: ABNORMAL /LPF
ERYTHROCYTE [DISTWIDTH] IN BLOOD BY AUTOMATED COUNT: 12.2 % (ref 11.5–14.5)
GLOBULIN SER CALC-MCNC: 3.5 G/DL (ref 2–4)
GLUCOSE SERPL-MCNC: 85 MG/DL (ref 65–100)
GLUCOSE UR STRIP.AUTO-MCNC: NEGATIVE MG/DL
HCG SERPL-ACNC: 9 MIU/ML (ref 0–6)
HCG UR QL: NEGATIVE
HCT VFR BLD AUTO: 37.2 % (ref 35–47)
HGB BLD-MCNC: 12.4 G/DL (ref 11.5–16)
HGB UR QL STRIP: ABNORMAL
HYALINE CASTS URNS QL MICRO: ABNORMAL /LPF (ref 0–5)
KETONES UR QL STRIP.AUTO: NEGATIVE MG/DL
KOH PREP SPEC: NORMAL
LEUKOCYTE ESTERASE UR QL STRIP.AUTO: ABNORMAL
LYMPHOCYTES # BLD AUTO: 36 % (ref 12–49)
LYMPHOCYTES # BLD: 3 K/UL (ref 0.8–3.5)
MCH RBC QN AUTO: 29.6 PG (ref 26–34)
MCHC RBC AUTO-ENTMCNC: 33.3 G/DL (ref 30–36.5)
MCV RBC AUTO: 88.8 FL (ref 80–99)
MONOCYTES # BLD: 0.6 K/UL (ref 0–1)
MONOCYTES NFR BLD AUTO: 7 % (ref 5–13)
NEUTS SEG # BLD: 4.8 K/UL (ref 1.8–8)
NEUTS SEG NFR BLD AUTO: 56 % (ref 32–75)
NITRITE UR QL STRIP.AUTO: NEGATIVE
PH UR STRIP: 5.5 [PH] (ref 5–8)
PLATELET # BLD AUTO: 223 K/UL (ref 150–400)
POTASSIUM SERPL-SCNC: 3.4 MMOL/L (ref 3.5–5.1)
PROT SERPL-MCNC: 7.8 G/DL (ref 6.4–8.2)
PROT UR STRIP-MCNC: NEGATIVE MG/DL
RBC # BLD AUTO: 4.19 M/UL (ref 3.8–5.2)
RBC #/AREA URNS HPF: ABNORMAL /HPF (ref 0–5)
SERVICE CMNT-IMP: NORMAL
SODIUM SERPL-SCNC: 141 MMOL/L (ref 136–145)
SP GR UR REFRACTOMETRY: 1.02 (ref 1–1.03)
T VAGINALIS VAG QL WET PREP: NORMAL
UA: UC IF INDICATED,UAUC: ABNORMAL
UROBILINOGEN UR QL STRIP.AUTO: 0.2 EU/DL (ref 0.2–1)
WBC # BLD AUTO: 8.4 K/UL (ref 3.6–11)
WBC URNS QL MICRO: ABNORMAL /HPF (ref 0–4)

## 2017-04-25 PROCEDURE — 87491 CHLMYD TRACH DNA AMP PROBE: CPT | Performed by: EMERGENCY MEDICINE

## 2017-04-25 PROCEDURE — 85025 COMPLETE CBC W/AUTO DIFF WBC: CPT | Performed by: EMERGENCY MEDICINE

## 2017-04-25 PROCEDURE — 87210 SMEAR WET MOUNT SALINE/INK: CPT | Performed by: EMERGENCY MEDICINE

## 2017-04-25 PROCEDURE — 84702 CHORIONIC GONADOTROPIN TEST: CPT | Performed by: EMERGENCY MEDICINE

## 2017-04-25 PROCEDURE — 36415 COLL VENOUS BLD VENIPUNCTURE: CPT | Performed by: EMERGENCY MEDICINE

## 2017-04-25 PROCEDURE — 80053 COMPREHEN METABOLIC PANEL: CPT | Performed by: EMERGENCY MEDICINE

## 2017-04-25 NOTE — ED PROVIDER NOTES
HPI Comments: Tanya Braxton is a 25 y.o. female with PMHx significant for anxiety, GERD, who presents ambulatory to [facility] ED with cc of diffuse lower abdominal pain and vaginal bleeding, onset this morning, 4/24/2017. Pt states that she was seen by her PCP last week on 4/17/2017, and she had a pregnancy test performed at that time which was negative. However, she took three urine pregnancy tests at home over the weekend (4/21/2017, 4/22/2017, and 4/23/2017), and they were all positive. She scheduled an appointment with her OB tomorrow to confirm the pregnancy, but then she woke up this morning with the abdominal pain and vaginal bleeding. She reports that the pain is \"cramping\" in nature at a 6/10 in severity. The vaginal bleeding is lighter than is typical for her menstrual cycle, and she reports her LMP was on 3/28/2017. Pt states that she used to take contraceptive medication, but stopped in 01/2017. She has not been trying to get pregnant, and has never been pregnant before. Pt also c/o some nausea associated with the abdominal pain. Pt specifically denies any dysuria, hematuria, vomiting, diarrhea, leg pain, leg swelling, CP, and SOB. PCP: Estuardo Neil MD  OBGYN: Dr. Jen Marvin    PMHx is significant for: hypoglycemia, anxiety, panic disorder, GERD, hypokalemia   PSHx is significant for: none   Social History: (-) Tobacco, (-) EtOH, (-) Illicit Drugs      There are no other complaints, changes, or physical findings at this time. The history is provided by the patient. Past Medical History:   Diagnosis Date    Endocrine disease     hypoglycemia    Gastrointestinal disorder     GERD    Hypokalemia     Panic attacks     Psychiatric disorder     anxiety, panic disorder       No past surgical history on file.       Family History:   Problem Relation Age of Onset   [facility] Migraines Mother        Social History     Social History    Marital status: SINGLE     Spouse name: N/A    Number of children: N/A  Years of education: N/A     Occupational History    Not on file. Social History Main Topics    Smoking status: Never Smoker    Smokeless tobacco: Never Used    Alcohol use No    Drug use: No    Sexual activity: Yes     Partners: Male     Birth control/ protection: Condom     Other Topics Concern    Not on file     Social History Narrative         ALLERGIES: Penicillins    Review of Systems   Constitutional: Negative. HENT: Negative. Eyes: Negative. Respiratory: Negative. Negative for shortness of breath. Cardiovascular: Negative. Negative for chest pain and leg swelling. Gastrointestinal: Positive for abdominal pain and nausea. Negative for diarrhea and vomiting. Genitourinary: Positive for vaginal bleeding. Negative for dysuria and hematuria. Musculoskeletal: Negative. Negative for myalgias. Skin: Negative. Neurological: Negative. Hematological: Negative. Psychiatric/Behavioral: Negative. All other systems reviewed and are negative. Patient Vitals for the past 12 hrs:   Temp Pulse Resp BP SpO2   04/25/17 0257 - 72 20 114/60 100 %   04/25/17 0145 - - - 110/57 100 %   04/25/17 0130 - - - 122/60 100 %   04/25/17 0115 - - - 124/79 100 %   04/25/17 0100 - - - 122/77 100 %   04/25/17 0045 - - - 127/85 100 %   04/25/17 0008 - - - 126/70 -   04/24/17 2332 98.5 °F (36.9 °C) 69 18 143/84 100 %        Physical Exam     General appearance - well nourished, well appearing, and in no distress  Eyes - pupils equal and reactive, extraocular eye movements intact  ENT - mucous membranes moist, pharynx normal without lesions  Neck - supple, no significant adenopathy; non-tender to palpation  Chest - clear to auscultation, no wheezes, rales or rhonchi; non-tender to palpation  Heart - normal rate and regular rhythm, S1 and S2 normal, no murmurs noted  Abdomen - soft, nontender, nondistended, no masses or organomegaly   - Pelvic Exam: Normal external genitalia.  There is a moderate amount of vaginal bleeding with no clots and no tenderness. Cervix is pink, round with a closed cervical os. No CMT. No adnexal TTP or masses. Musculoskeletal - no joint tenderness, deformity or swelling; normal ROM  Extremities - peripheral pulses normal, no pedal edema  Skin - normal coloration and turgor, no rashes  Neurological - alert, oriented x3, normal speech, no focal findings or movement disorder noted       MDM  Number of Diagnoses or Management Options  Diagnosis management comments: DDx: threatened miscarriage, spontaneous miscarriage, menstrual cramping/bleeding       Amount and/or Complexity of Data Reviewed  Clinical lab tests: reviewed and ordered  Review and summarize past medical records: yes    Patient Progress  Patient progress: stable    ED Course       Procedures    Procedure Note - Pelvic Exam:    1:48 AM  Performed by: Riri Sanford MD  Chaperoned by: MANISH Chaidez  Pelvic exam was performed using bimanual and speculum. Further findings noted in physical exam.   The procedure took 1-15 minutes, and pt tolerated well. Written by Sterling Thomson ED Scribe, as dictated by Riri Sanford MD.      Progress Note:  2:58 AM  Pt ready for discharge. Discussed lab findings with pt, and she will follow up as instructed. All questions have been answered, pt voiced understanding and agreement with plan. Specific return precautions provided as well as instructions to return to the ED should sx worsen at any time.   Written by Lester Okeefe ED Scribe, as dictated by Deloris Bradley MD.      LABORATORY TESTS:  Recent Results (from the past 12 hour(s))   HCG URINE, QL    Collection Time: 04/24/17 11:41 PM   Result Value Ref Range    HCG urine, Ql. NEGATIVE  NEG     URINALYSIS W/ REFLEX CULTURE    Collection Time: 04/24/17 11:41 PM   Result Value Ref Range    Color YELLOW/STRAW      Appearance CLEAR CLEAR      Specific gravity 1.020 1.003 - 1.030      pH (UA) 5.5 5.0 - 8.0 Protein NEGATIVE  NEG mg/dL    Glucose NEGATIVE  NEG mg/dL    Ketone NEGATIVE  NEG mg/dL    Bilirubin NEGATIVE  NEG      Blood LARGE (A) NEG      Urobilinogen 0.2 0.2 - 1.0 EU/dL    Nitrites NEGATIVE  NEG      Leukocyte Esterase TRACE (A) NEG      WBC 5-10 0 - 4 /hpf    RBC 0-5 0 - 5 /hpf    Epithelial cells FEW FEW /lpf    Bacteria NEGATIVE  NEG /hpf    UA:UC IF INDICATED URINE CULTURE ORDERED (A) CNI      Hyaline cast 0-2 0 - 5 /lpf   CBC WITH AUTOMATED DIFF    Collection Time: 04/25/17 12:14 AM   Result Value Ref Range    WBC 8.4 3.6 - 11.0 K/uL    RBC 4.19 3.80 - 5.20 M/uL    HGB 12.4 11.5 - 16.0 g/dL    HCT 37.2 35.0 - 47.0 %    MCV 88.8 80.0 - 99.0 FL    MCH 29.6 26.0 - 34.0 PG    MCHC 33.3 30.0 - 36.5 g/dL    RDW 12.2 11.5 - 14.5 %    PLATELET 949 136 - 832 K/uL    NEUTROPHILS 56 32 - 75 %    LYMPHOCYTES 36 12 - 49 %    MONOCYTES 7 5 - 13 %    EOSINOPHILS 1 0 - 7 %    BASOPHILS 0 0 - 1 %    ABS. NEUTROPHILS 4.8 1.8 - 8.0 K/UL    ABS. LYMPHOCYTES 3.0 0.8 - 3.5 K/UL    ABS. MONOCYTES 0.6 0.0 - 1.0 K/UL    ABS. EOSINOPHILS 0.1 0.0 - 0.4 K/UL    ABS. BASOPHILS 0.0 0.0 - 0.1 K/UL   METABOLIC PANEL, COMPREHENSIVE    Collection Time: 04/25/17 12:14 AM   Result Value Ref Range    Sodium 141 136 - 145 mmol/L    Potassium 3.4 (L) 3.5 - 5.1 mmol/L    Chloride 108 97 - 108 mmol/L    CO2 25 21 - 32 mmol/L    Anion gap 8 5 - 15 mmol/L    Glucose 85 65 - 100 mg/dL    BUN 8 6 - 20 MG/DL    Creatinine 0.80 0.55 - 1.02 MG/DL    BUN/Creatinine ratio 10 (L) 12 - 20      GFR est AA >60 >60 ml/min/1.73m2    GFR est non-AA >60 >60 ml/min/1.73m2    Calcium 9.0 8.5 - 10.1 MG/DL    Bilirubin, total 0.7 0.2 - 1.0 MG/DL    ALT (SGPT) 17 12 - 78 U/L    AST (SGOT) 10 (L) 15 - 37 U/L    Alk. phosphatase 30 (L) 45 - 117 U/L    Protein, total 7.8 6.4 - 8.2 g/dL    Albumin 4.3 3.5 - 5.0 g/dL    Globulin 3.5 2.0 - 4.0 g/dL    A-G Ratio 1.2 1.1 - 2.2     TOTAL HCG, QT.     Collection Time: 04/25/17 12:14 AM   Result Value Ref Range    Beta HCG, QT 9 (H) 0 - 6 MIU/ML   SHOLA, OTHER SOURCES    Collection Time: 04/25/17  1:46 AM   Result Value Ref Range    Special Requests: NO SPECIAL REQUESTS      KOH NO YEAST SEEN     WET PREP    Collection Time: 04/25/17  1:46 AM   Result Value Ref Range    Clue cells CLUE CELLS PRESENT      Wet prep NO TRICHOMONAS SEEN         IMPRESSION:  1. Elevated serum hCG    2. Vaginal bleeding    3. Threatened miscarriage        PLAN:  1. Discharge Medication List as of 4/25/2017  2:55 AM        2. Follow-up Information     Follow up With Details Comments Melony Garcia MD Today as scheduled [facility]  [address]  [address] [place] [address]  [phone]          3. Return to ED if worse     Discharge Note:  3:00 AM  The patient has been re-evaluated and is ready for discharge. Reviewed available results with patient. Counseled patient on diagnosis and care plan. Patient has expressed understanding, and all questions have been answered. Patient agrees with plan and agrees to follow up as recommended, or to return to the ED if their symptoms worsen. Discharge instructions have been provided and explained to the patient, along with reasons to return to the ED. This note is prepared by [employer], acting as Scribe for Deloris Smith MD.     Kang Crooks MD: The scribe's documentation has been prepared under my direction and personally reviewed by me in its entirety. I confirm that the note above accurately reflects all work, treatment, procedures, and medical decision making performed by me. This note will not be viewable in [address]

## 2017-04-25 NOTE — ED NOTES
Pelvic exam performed by Eda Arzate MD. Pt tolerated well. Patient resting comfortably at this time. Plan of care discussed. Call bell within reach.

## 2017-04-25 NOTE — ED NOTES
Assumed care of patient. Pt arrived to ED via triage with c/o vaginal bleeding x 1 day. Pt reports recently being told she is 3-4 wks pregnant. Patient denies clots at this time. Pt having minimal cramping. Patient reports only needing to use 1 pad since bleeding started this evening. Patient placed on monitor x 2. Patient in no apparent distress at this time. Rails up x 2. Call bell within reach. Plan of care discussed. Wheels locked and bed low.

## 2017-04-25 NOTE — DISCHARGE INSTRUCTIONS

## 2017-04-26 LAB
BACTERIA SPEC CULT: NORMAL
C TRACH DNA SPEC QL NAA+PROBE: NEGATIVE
CC UR VC: NORMAL
N GONORRHOEA DNA SPEC QL NAA+PROBE: NEGATIVE
SAMPLE TYPE: NORMAL
SERVICE CMNT-IMP: NORMAL
SERVICE CMNT-IMP: NORMAL
SPECIMEN SOURCE: NORMAL

## 2017-07-03 LAB
ANTIBODY SCREEN, EXTERNAL: NEGATIVE
HBSAG, EXTERNAL: NEGATIVE
HIV, EXTERNAL: NON REACTIVE
RPR, EXTERNAL: NON REACTIVE
RUBELLA, EXTERNAL: NORMAL
TYPE, ABO & RH, EXTERNAL: NORMAL

## 2017-07-19 ENCOUNTER — HOSPITAL ENCOUNTER (EMERGENCY)
Age: 22
Discharge: HOME | End: 2017-07-20
Attending: EMERGENCY MEDICINE | Admitting: EMERGENCY MEDICINE
Payer: COMMERCIAL

## 2017-07-19 ENCOUNTER — APPOINTMENT (OUTPATIENT)
Dept: ULTRASOUND IMAGING | Age: 22
End: 2017-07-19
Attending: EMERGENCY MEDICINE
Payer: COMMERCIAL

## 2017-07-19 VITALS
SYSTOLIC BLOOD PRESSURE: 117 MMHG | BODY MASS INDEX: 18.71 KG/M2 | HEART RATE: 70 BPM | OXYGEN SATURATION: 97 % | DIASTOLIC BLOOD PRESSURE: 58 MMHG | TEMPERATURE: 98.6 F | WEIGHT: 105.6 LBS | HEIGHT: 63 IN | RESPIRATION RATE: 18 BRPM

## 2017-07-19 DIAGNOSIS — N83.11 CORPUS LUTEUM CYST OF RIGHT OVARY: Primary | ICD-10-CM

## 2017-07-19 DIAGNOSIS — Z3A.08 8 WEEKS GESTATION OF PREGNANCY: ICD-10-CM

## 2017-07-19 DIAGNOSIS — R10.31 ABDOMINAL PAIN, RLQ (RIGHT LOWER QUADRANT): ICD-10-CM

## 2017-07-19 LAB
ALBUMIN SERPL BCP-MCNC: 3.7 G/DL (ref 3.5–5)
ALBUMIN/GLOB SERPL: 1.1 {RATIO} (ref 1.1–2.2)
ALP SERPL-CCNC: 33 U/L (ref 45–117)
ALT SERPL-CCNC: 15 U/L (ref 12–78)
ANION GAP BLD CALC-SCNC: 5 MMOL/L (ref 5–15)
APPEARANCE UR: CLEAR
AST SERPL W P-5'-P-CCNC: 10 U/L (ref 15–37)
BACTERIA URNS QL MICRO: ABNORMAL /HPF
BASOPHILS # BLD AUTO: 0 K/UL (ref 0–0.1)
BASOPHILS # BLD: 0 % (ref 0–1)
BILIRUB SERPL-MCNC: 0.3 MG/DL (ref 0.2–1)
BILIRUB UR QL: NEGATIVE
BUN SERPL-MCNC: 13 MG/DL (ref 6–20)
BUN/CREAT SERPL: 18 (ref 12–20)
CALCIUM SERPL-MCNC: 8.4 MG/DL (ref 8.5–10.1)
CHLORIDE SERPL-SCNC: 105 MMOL/L (ref 97–108)
CO2 SERPL-SCNC: 26 MMOL/L (ref 21–32)
COLOR UR: ABNORMAL
CREAT SERPL-MCNC: 0.73 MG/DL (ref 0.55–1.02)
EOSINOPHIL # BLD: 0.2 K/UL (ref 0–0.4)
EOSINOPHIL NFR BLD: 2 % (ref 0–7)
EPITH CASTS URNS QL MICRO: ABNORMAL /LPF
ERYTHROCYTE [DISTWIDTH] IN BLOOD BY AUTOMATED COUNT: 12.5 % (ref 11.5–14.5)
GLOBULIN SER CALC-MCNC: 3.3 G/DL (ref 2–4)
GLUCOSE SERPL-MCNC: 90 MG/DL (ref 65–100)
GLUCOSE UR STRIP.AUTO-MCNC: NEGATIVE MG/DL
HCT VFR BLD AUTO: 34 % (ref 35–47)
HGB BLD-MCNC: 11.4 G/DL (ref 11.5–16)
HGB UR QL STRIP: NEGATIVE
KETONES UR QL STRIP.AUTO: ABNORMAL MG/DL
LEUKOCYTE ESTERASE UR QL STRIP.AUTO: ABNORMAL
LYMPHOCYTES # BLD AUTO: 33 % (ref 12–49)
LYMPHOCYTES # BLD: 2.9 K/UL (ref 0.8–3.5)
MCH RBC QN AUTO: 30.4 PG (ref 26–34)
MCHC RBC AUTO-ENTMCNC: 33.5 G/DL (ref 30–36.5)
MCV RBC AUTO: 90.7 FL (ref 80–99)
MONOCYTES # BLD: 0.3 K/UL (ref 0–1)
MONOCYTES NFR BLD AUTO: 3 % (ref 5–13)
NEUTS SEG # BLD: 5.3 K/UL (ref 1.8–8)
NEUTS SEG NFR BLD AUTO: 62 % (ref 32–75)
NITRITE UR QL STRIP.AUTO: NEGATIVE
PH UR STRIP: 6.5 [PH] (ref 5–8)
PLATELET # BLD AUTO: 228 K/UL (ref 150–400)
POTASSIUM SERPL-SCNC: 3.9 MMOL/L (ref 3.5–5.1)
PROT SERPL-MCNC: 7 G/DL (ref 6.4–8.2)
PROT UR STRIP-MCNC: NEGATIVE MG/DL
RBC # BLD AUTO: 3.75 M/UL (ref 3.8–5.2)
RBC #/AREA URNS HPF: ABNORMAL /HPF (ref 0–5)
SODIUM SERPL-SCNC: 136 MMOL/L (ref 136–145)
SP GR UR REFRACTOMETRY: 1.03 (ref 1–1.03)
UA: UC IF INDICATED,UAUC: ABNORMAL
UROBILINOGEN UR QL STRIP.AUTO: 1 EU/DL (ref 0.2–1)
WBC # BLD AUTO: 8.7 K/UL (ref 3.6–11)
WBC URNS QL MICRO: ABNORMAL /HPF (ref 0–4)

## 2017-07-19 PROCEDURE — 74011250637 HC RX REV CODE- 250/637: Performed by: EMERGENCY MEDICINE

## 2017-07-19 PROCEDURE — 76801 OB US < 14 WKS SINGLE FETUS: CPT

## 2017-07-19 PROCEDURE — 80053 COMPREHEN METABOLIC PANEL: CPT | Performed by: EMERGENCY MEDICINE

## 2017-07-19 PROCEDURE — 85025 COMPLETE CBC W/AUTO DIFF WBC: CPT | Performed by: EMERGENCY MEDICINE

## 2017-07-19 PROCEDURE — 36415 COLL VENOUS BLD VENIPUNCTURE: CPT | Performed by: EMERGENCY MEDICINE

## 2017-07-19 PROCEDURE — 74011250636 HC RX REV CODE- 250/636: Performed by: EMERGENCY MEDICINE

## 2017-07-19 PROCEDURE — 87086 URINE CULTURE/COLONY COUNT: CPT | Performed by: EMERGENCY MEDICINE

## 2017-07-19 PROCEDURE — 96361 HYDRATE IV INFUSION ADD-ON: CPT

## 2017-07-19 PROCEDURE — 96360 HYDRATION IV INFUSION INIT: CPT

## 2017-07-19 PROCEDURE — 99283 EMERGENCY DEPT VISIT LOW MDM: CPT

## 2017-07-19 PROCEDURE — 81001 URINALYSIS AUTO W/SCOPE: CPT | Performed by: EMERGENCY MEDICINE

## 2017-07-19 RX ORDER — ONDANSETRON 2 MG/ML
4 INJECTION INTRAMUSCULAR; INTRAVENOUS
Status: DISCONTINUED | OUTPATIENT
Start: 2017-07-19 | End: 2017-07-20 | Stop reason: HOSPADM

## 2017-07-19 RX ORDER — ACETAMINOPHEN 325 MG/1
650 TABLET ORAL ONCE
Status: COMPLETED | OUTPATIENT
Start: 2017-07-19 | End: 2017-07-19

## 2017-07-19 RX ORDER — OXYCODONE AND ACETAMINOPHEN 5; 325 MG/1; MG/1
1 TABLET ORAL
Qty: 10 TAB | Refills: 0 | Status: SHIPPED | OUTPATIENT
Start: 2017-07-19 | End: 2018-01-01

## 2017-07-19 RX ORDER — ONDANSETRON 4 MG/1
4 TABLET, ORALLY DISINTEGRATING ORAL
Qty: 10 TAB | Refills: 0 | Status: SHIPPED | OUTPATIENT
Start: 2017-07-19 | End: 2018-01-01

## 2017-07-19 RX ADMIN — ACETAMINOPHEN 650 MG: 325 TABLET, FILM COATED ORAL at 22:19

## 2017-07-19 RX ADMIN — SODIUM CHLORIDE 1000 ML: 900 INJECTION, SOLUTION INTRAVENOUS at 22:19

## 2017-07-20 NOTE — DISCHARGE INSTRUCTIONS
Weeks 6 to 10 of Your Pregnancy: Care Instructions  Your Care Instructions    Congratulations on your pregnancy. This is an exciting and important time for you. During the first 6 to 10 weeks of your pregnancy, your body goes through many changes. Your baby grows very fast, even though you cannot feel it yet. You may start to notice that you feel different, both in your body and your emotions. Because each woman's pregnancy is unique, there is no right way to feel. You may feel the healthiest you have ever been, or you may feel tired or sick to your stomach (\"morning sickness\"). These early weeks are a time to make healthy choices and to eat the best foods for you and your baby. This care sheet will give you some ideas. This is also a good time to think about birth defects testing. These are tests done during pregnancy to look for possible problems with the baby. First trimester tests for birth defects can be done between 8 and 17 weeks of pregnancy, depending on the test. Talk with your doctor about what kinds of tests are available. Follow-up care is a key part of your treatment and safety. Be sure to make and go to all appointments, and call your doctor if you are having problems. It's also a good idea to know your test results and keep a list of the medicines you take. How can you care for yourself at home? Eat well  · Eat at least 3 meals and 2 healthy snacks every day. Eat fresh, whole foods, including:  ¨ 7 or more servings of bread, tortillas, cereal, rice, pasta, or oatmeal.  ¨ 3 or more servings of vegetables, especially leafy green vegetables. ¨ 2 or more servings of fruits. ¨ 3 or more servings of milk, yogurt, or cheese. ¨ 2 or more servings of meat, turkey, chicken, fish, eggs, or dried beans. · Drink plenty of fluids, especially water. Avoid sodas and other sweetened drinks. · Choose foods that have important vitamins for your baby, such as calcium, iron, and folate.   ¨ Dairy products, tofu, canned fish with bones, almonds, broccoli, dark leafy greens, corn tortillas, and fortified orange juice are good sources of calcium. ¨ Beef, poultry, liver, spinach, lentils, dried beans, fortified cereals, and dried fruits are rich in iron. ¨ Dark leafy greens, broccoli, asparagus, liver, fortified cereals, orange juice, peanuts, and almonds are good sources of folate. · Avoid foods that could harm your baby. ¨ Do not eat raw or undercooked meat, chicken, or fish (such as sushi or raw oysters). ¨ Do not eat raw eggs or foods that contain raw eggs, such as Caesar dressing. ¨ Do not eat soft cheeses and unpasteurized dairy foods, such as Brie, feta, or blue cheese. ¨ Do not eat fish that contains a lot of mercury, such as shark, swordfish, tilefish, or terrance mackerel. Do not eat more than 6 ounces of tuna each week. ¨ Do not eat raw sprouts, especially alfalfa sprouts. ¨ Cut down on caffeine, such as coffee, tea, and cola. Protect yourself and your baby  · Do not touch william litter or cat feces. They can cause an infection that could harm your baby. · High body temperature can be harmful to your baby. So if you want to use a sauna or hot tub, be sure to talk to your doctor about how to use it safely. [place] with morning sickness  · Sip small amounts of water, juices, or shakes. Try drinking between meals, not with meals. · Eat 5 or 6 small meals a day. Try dry toast or crackers when you first get up, and eat breakfast a little later. · Avoid spicy, greasy, and fatty foods. · When you feel sick, open your windows or go for a short walk to get fresh air. · Try nausea wristbands. These help some women. · Tell your doctor if you think your prenatal vitamins make you sick. Where can you learn more? Go to http://pura.info/. Enter G112 in the search box to learn more about \"Weeks 6 to 10 of Your Pregnancy: Care Instructions. \"  Current as of: March 16, 2017  Content Version: 11.3  © 9152-0204 [employer]. Care instructions adapted under license by [employer] (which disclaims liability or warranty for this information). If you have questions about a medical condition or this instruction, always ask your healthcare professional. [address] any warranty or liability for your use of this information.

## 2017-07-20 NOTE — ED PROVIDER NOTES
HPI Comments: Cipriano Romero, 25 y.o. female, with PMHx of anxiety, GERD, kidney failure presents ambulatory to [id number] [facility] ED with cc of \"sharp\", constant right-sided abdominal pain present for the 2-3 days that radiates to her back. Pt also notes associated vomiting episodes (2x) today. Pt remarks that she is approximately 8 weeks and 2 days pregnant, but has not had an US done yet. She notes that she plans on seeing Dr. Hammad Nix as her OBGYN once her Medicaid renews. Patient specifically denies vaginal bleeding, fever, chills, SOB, dysuria. PCP: Carol Morton MD    Social history significant for: - Tobacco, - EtOH, - Illicit Drug Use    There are no other complaints, changes, or physical findings at this time. The history is provided by the patient. No  was used. Past Medical History:   Diagnosis Date    Endocrine disease     hypoglycemia    Gastrointestinal disorder     GERD    Hypokalemia     Ill-defined condition     kidney failure    Panic attacks     Psychiatric disorder     anxiety, panic disorder       History reviewed. No pertinent surgical history. Family History:   Problem Relation Age of Onset   24 [facility] Mother        Social History     Social History    Marital status: SINGLE     Spouse name: N/A    Number of children: N/A    Years of education: N/A     Occupational History    Not on file. Social History Main Topics    Smoking status: Never Smoker    Smokeless tobacco: Never Used    Alcohol use No    Drug use: No    Sexual activity: Yes     Partners: Male     Birth control/ protection: Condom     Other Topics Concern    Not on file     Social History Narrative         ALLERGIES: Penicillins    Review of Systems   Constitutional: Negative for chills and fever. HENT: Negative for congestion, ear pain, rhinorrhea, sore throat and trouble swallowing. Eyes: Negative for visual disturbance.    Respiratory: Negative for cough, chest tightness and shortness of breath. Cardiovascular: Negative for chest pain and palpitations. Gastrointestinal: Positive for abdominal pain and vomiting. Negative for blood in stool, constipation, diarrhea and nausea. Genitourinary: Negative for decreased urine volume, difficulty urinating, dysuria, frequency, vaginal bleeding and vaginal discharge. Musculoskeletal: Negative for back pain and neck pain. Skin: Negative for color change and rash. Neurological: Negative for dizziness, weakness, light-headedness and headaches. Patient Vitals for the past 12 hrs:   Temp Pulse Resp BP SpO2   07/19/17 2024 98.6 °F (37 °C) 70 18 116/57 100 %       Physical Exam   Constitutional: She is oriented to person, place, and time. Vital signs are normal. She appears well-developed and well-nourished. She does not appear ill. No distress. HENT:   Mouth/Throat: Oropharynx is clear and moist.   Eyes: Conjunctivae are normal.   Neck: Neck supple. Cardiovascular: Normal rate and regular rhythm. Pulmonary/Chest: Effort normal and breath sounds normal. No accessory muscle usage. No respiratory distress. Abdominal: Soft. She exhibits no distension.   + Right-sided upper and lower quadrant tenderness   Lymphadenopathy:     She has no cervical adenopathy. Neurological: She is alert and oriented to person, place, and time. She has normal strength. No cranial nerve deficit or sensory deficit. Skin: Skin is warm and dry. Nursing note and vitals reviewed. MDM  Number of Diagnoses or Management Options  Diagnosis management comments: DDx: rule out ectopic pregnancy, corpus luteum cyst, appendicitis.        Amount and/or Complexity of Data Reviewed  Clinical lab tests: ordered and reviewed  Tests in the radiology section of CPT®: ordered and reviewed  Review and summarize past medical records: yes  Independent visualization of images, tracings, or specimens: yes    Patient Progress  Patient progress: stable    ED Course Procedures     LABORATORY TESTS:  Recent Results (from the past 12 hour(s))   URINALYSIS W/ REFLEX CULTURE    Collection Time: 07/19/17  8:38 PM   Result Value Ref Range    Color YELLOW/STRAW      Appearance CLEAR CLEAR      Specific gravity 1.029 1.003 - 1.030      pH (UA) 6.5 5.0 - 8.0      Protein NEGATIVE  NEG mg/dL    Glucose NEGATIVE  NEG mg/dL    Ketone TRACE (A) NEG mg/dL    Bilirubin NEGATIVE  NEG      Blood NEGATIVE  NEG      Urobilinogen 1.0 0.2 - 1.0 EU/dL    Nitrites NEGATIVE  NEG      Leukocyte Esterase TRACE (A) NEG      WBC 5-10 0 - 4 /hpf    RBC 5-10 0 - 5 /hpf    Epithelial cells FEW FEW /lpf    Bacteria 1+ (A) NEG /hpf    UA:UC IF INDICATED URINE CULTURE ORDERED (A) CNI     CBC WITH AUTOMATED DIFF    Collection Time: 07/19/17  9:41 PM   Result Value Ref Range    WBC 8.7 3.6 - 11.0 K/uL    RBC 3.75 (L) 3.80 - 5.20 M/uL    HGB 11.4 (L) 11.5 - 16.0 g/dL    HCT 34.0 (L) 35.0 - 47.0 %    MCV 90.7 80.0 - 99.0 FL    MCH 30.4 26.0 - 34.0 PG    MCHC 33.5 30.0 - 36.5 g/dL    RDW 12.5 11.5 - 14.5 %    PLATELET 136 535 - 157 K/uL    NEUTROPHILS 62 32 - 75 %    LYMPHOCYTES 33 12 - 49 %    MONOCYTES 3 (L) 5 - 13 %    EOSINOPHILS 2 0 - 7 %    BASOPHILS 0 0 - 1 %    ABS. NEUTROPHILS 5.3 1.8 - 8.0 K/UL    ABS. LYMPHOCYTES 2.9 0.8 - 3.5 K/UL    ABS. MONOCYTES 0.3 0.0 - 1.0 K/UL    ABS. EOSINOPHILS 0.2 0.0 - 0.4 K/UL    ABS.  BASOPHILS 0.0 0.0 - 0.1 K/UL   METABOLIC PANEL, COMPREHENSIVE    Collection Time: 07/19/17  9:41 PM   Result Value Ref Range    Sodium 136 136 - 145 mmol/L    Potassium 3.9 3.5 - 5.1 mmol/L    Chloride 105 97 - 108 mmol/L    CO2 26 21 - 32 mmol/L    Anion gap 5 5 - 15 mmol/L    Glucose 90 65 - 100 mg/dL    BUN 13 6 - 20 MG/DL    Creatinine 0.73 0.55 - 1.02 MG/DL    BUN/Creatinine ratio 18 12 - 20      GFR est AA >60 >60 ml/min/1.73m2    GFR est non-AA >60 >60 ml/min/1.73m2    Calcium 8.4 (L) 8.5 - 10.1 MG/DL    Bilirubin, total 0.3 0.2 - 1.0 MG/DL    ALT (SGPT) 15 12 - 78 U/L    AST (SGOT) 10 (L) 15 - 37 U/L    Alk. phosphatase 33 (L) 45 - 117 U/L    Protein, total 7.0 6.4 - 8.2 g/dL    Albumin 3.7 3.5 - 5.0 g/dL    Globulin 3.3 2.0 - 4.0 g/dL    A-G Ratio 1.1 1.1 - 2.2         IMAGING RESULTS:  US PREG UTS < 14 WKS SNGL   Final Result   EXAM:  US PREG UTS < 14 WKS SNGL     INDICATION:   RLQ pain for 3 days     COMPARISON: None.     TECHNIQUE:  Realtime sonography of the pelvis was performed transabdominally with multiple  static images obtained. The patient declined transvaginal evaluation.     FINDINGS:     The uterus measures 8.2 x 4.6 x 6.7 cm. The examination demonstrates a single intrauterine pregnancy with a crown-rump  length of 1.7 cm and estimated gestational age of 5 week 1 day. The oval  gestational sac measures 3.1 x 2.5 x 4.4 cm and has a strong decidual reaction. Fetal cardiac activity is identified with a fetal heart rate of 171 beats per  minute. The placenta is not seen at this age. .  There is adequate amniotic  fluid. The right ovary measures 3.1 x 2.1 x 2.6 cm and has normal flow; there  is a 2.4 x 1.9 x 2.1 cm involuting right corpus luteum. The left ovary is not  seen secondary to position.      IMPRESSION  IMPRESSION: Single live 8 week 1 day intrauterine pregnancy. MEDICATIONS GIVEN:  Medications   ondansetron (ZOFRAN) injection 4 mg (4 mg IntraVENous Refused 7/19/17 2220)   acetaminophen (TYLENOL) tablet 650 mg (650 mg Oral Given 7/19/17 2219)   sodium chloride 0.9 % bolus infusion 1,000 mL (1,000 mL IntraVENous New Bag 7/19/17 2219)       IMPRESSION:  1. Corpus luteum cyst of right ovary    2. Abdominal pain, RLQ (right lower quadrant)    3. 8 weeks gestation of pregnancy        PLAN:  1. Discharge   Current Discharge Medication List      START taking these medications    Details   oxyCODONE-acetaminophen (PERCOCET) 5-325 mg per tablet Take 1 Tab by mouth every six (6) hours as needed for Pain. Max Daily Amount: 4 Tabs. Qty: 10 Tab, Refills: 0           2. Follow-up Information     Follow up With Details Comments 205 MD Jose Schedule an appointment as soon as possible for a visit in 1 week  Kevin Clement [phone]          Return to ED if worse       Discharge Note:  11:07 PM  The pt is ready for discharge. The pt's signs, symptoms, diagnosis, and discharge instructions have been discussed and pt has conveyed their understanding. The pt is to follow up as recommended or return to ER should their symptoms worsen. Plan has been discussed and pt is in agreement. This note is prepared by Claudia Wiseman, acting as a Scribe for Corrie Cantu MD.    Corrie Cantu MD: The scribe's documentation has been prepared under my direction and personally reviewed by me in its entirety. I confirm that the notes above accurately reflects all work, treatment, procedures, and medical decision making performed by me.

## 2017-07-21 LAB
BACTERIA SPEC CULT: NORMAL
CC UR VC: NORMAL
SERVICE CMNT-IMP: NORMAL

## 2017-08-28 LAB
CHLAMYDIA, EXTERNAL: NEGATIVE
N. GONORRHEA, EXTERNAL: NEGATIVE

## 2017-11-11 ENCOUNTER — HOSPITAL ENCOUNTER (EMERGENCY)
Age: 22
Discharge: HOME | End: 2017-11-11
Attending: EMERGENCY MEDICINE | Admitting: OBSTETRICS & GYNECOLOGY
Payer: MEDICAID

## 2017-11-11 VITALS
RESPIRATION RATE: 20 BRPM | SYSTOLIC BLOOD PRESSURE: 110 MMHG | TEMPERATURE: 98.2 F | OXYGEN SATURATION: 99 % | WEIGHT: 122.8 LBS | BODY MASS INDEX: 21.76 KG/M2 | HEART RATE: 66 BPM | HEIGHT: 63 IN | DIASTOLIC BLOOD PRESSURE: 58 MMHG

## 2017-11-11 DIAGNOSIS — O26.893 PELVIC PAIN AFFECTING PREGNANCY IN THIRD TRIMESTER, ANTEPARTUM: Primary | ICD-10-CM

## 2017-11-11 DIAGNOSIS — R10.2 PELVIC PAIN AFFECTING PREGNANCY IN THIRD TRIMESTER, ANTEPARTUM: Primary | ICD-10-CM

## 2017-11-11 DIAGNOSIS — Z86.59 HISTORY OF PANIC ATTACKS: ICD-10-CM

## 2017-11-11 DIAGNOSIS — F41.9 ANXIETY: ICD-10-CM

## 2017-11-11 LAB
APPEARANCE UR: CLEAR
BACTERIA URNS QL MICRO: NEGATIVE /HPF
BILIRUB UR QL CFM: NEGATIVE
COLOR UR: ABNORMAL
EPITH CASTS URNS QL MICRO: ABNORMAL /LPF
GLUCOSE UR STRIP.AUTO-MCNC: NEGATIVE MG/DL
HGB UR QL STRIP: NEGATIVE
KETONES UR QL STRIP.AUTO: 15 MG/DL
LEUKOCYTE ESTERASE UR QL STRIP.AUTO: ABNORMAL
NITRITE UR QL STRIP.AUTO: NEGATIVE
PH UR STRIP: 6.5 [PH] (ref 5–8)
PROT UR STRIP-MCNC: NEGATIVE MG/DL
RBC #/AREA URNS HPF: ABNORMAL /HPF (ref 0–5)
SP GR UR REFRACTOMETRY: 1.02 (ref 1–1.03)
UA: UC IF INDICATED,UAUC: ABNORMAL
UROBILINOGEN UR QL STRIP.AUTO: 4 EU/DL (ref 0.2–1)
WBC URNS QL MICRO: ABNORMAL /HPF (ref 0–4)

## 2017-11-11 PROCEDURE — 99282 EMERGENCY DEPT VISIT SF MDM: CPT

## 2017-11-11 PROCEDURE — 99284 EMERGENCY DEPT VISIT MOD MDM: CPT

## 2017-11-11 PROCEDURE — 81001 URINALYSIS AUTO W/SCOPE: CPT | Performed by: PHYSICIAN ASSISTANT

## 2017-11-11 NOTE — IP AVS SNAPSHOT
[address] [facility] 
[phone] Patient: Jak Vanegas 
MRN: [id number] :1995 About your hospitalization You were admitted on:  N/A You last received care in the:  [facility] 3 LD TRIAGE You were discharged on:  2017 Why you were hospitalized Your primary diagnosis was:  Not on File Discharge Orders None A check leticia indicates which time of day the medication should be taken. My Medications ASK your physician about these medications Instructions Each Dose to Equal  
 Morning Noon Evening Bedtime  
 ondansetron 4 mg disintegrating tablet Commonly known as:  ZOFRAN ODT Your last dose was: Your next dose is: Take 1 Tab by mouth every eight (8) hours as needed for Nausea. 4 mg  
    
   
   
   
  
 oxyCODONE-acetaminophen 5-325 mg per tablet Commonly known as:  PERCOCET Your last dose was: Your next dose is: Take 1 Tab by mouth every six (6) hours as needed for Pain. Max Daily Amount: 4 Tabs. 1 Tab PNV No12-Iron-FA-DSS-OM-3 29 mg iron-1 mg -50 mg Cpkd Your last dose was: Your next dose is: Take  by mouth. Discharge Instructions Pelvic Pain: Care Instructions Your Care Instructions Pelvic pain, or pain in the lower belly, can have many causes. Often pelvic pain is not serious and gets better in a few days. If your pain continues or gets worse, you may need tests and treatment. Tell your doctor about any new symptoms. These may be signs of a serious problem. Follow-up care is a key part of your treatment and safety. Be sure to make and go to all appointments, and call your doctor if you are having problems. It's also a good idea to know your test results and keep a list of the medicines you take. How can you care for yourself at home? · Rest until you feel better. Lie down, and raise your legs by placing a pillow under your knees. · Drink plenty of fluids. You may find that small, frequent sips are easier on your stomach than if you drink a lot at once. Avoid drinks with carbonation or caffeine, such as soda pop, tea, or coffee. · Try eating several small meals instead of 2 or 3 large ones. Eat mild foods, such as rice, dry toast or crackers, bananas, and applesauce. Avoid fatty and spicy foods, other fruits, and alcohol until 48 hours after your symptoms have gone away. · Take an over-the-counter pain medicine, such as acetaminophen (Tylenol), ibuprofen (Advil, Motrin), or naproxen (Aleve). Read and follow all instructions on the label. · Do not take two or more pain medicines at the same time unless the doctor told you to. Many pain medicines have acetaminophen, which is Tylenol. Too much acetaminophen (Tylenol) can be harmful. · You can put a heating pad, a warm cloth, or moist heat on your belly to relieve pain. When should you call for help? Call your doctor now or seek immediate medical care if: 
· You have a new or higher fever. · You have unusual vaginal bleeding. · You have new or worse belly or pelvic pain. · You have vaginal discharge that has increased in amount or smells bad. Watch closely for changes in your health, and be sure to contact your doctor if: 
· You do not get better as expected. Where can you learn more? Go to http://robbie-zehra.info/. Enter [phone] in the search box to learn more about \"Pelvic Pain: Care Instructions. \" Current as of: October 13, 2016 Content Version: 11.4 © 7406-8938 [employer]. Care instructions adapted under license by [employer] (which disclaims liability or warranty for this information).  If you have questions about a medical condition or this instruction, always ask your healthcare professional. [place] [address] any warranty or liability for your use of this information. Belly Pain in Pregnancy: Care Instructions Your Care Instructions When you're pregnant, any belly pain can be a worry. You may not want to call your doctor about every pain you have. But you don't want to miss something that is dangerous for you or your baby. Even if it feels familiar, belly pain can mean something new when you're pregnant. It's important to know when to call your doctor. It will also help to know how to care for yourself at home when your pain is not caused by anything harmful. · When belly pain is more severe or constant, see a doctor right away. · If you're sure your belly pain is a sign of labor, call your doctor. · When belly pain is brief, it's usually a normal part of pregnancy. It might be related to changes in the growing uterus. Or it could be the stretching of ligaments called round ligaments. These ligaments help support the uterus. Round ligament pain can be on either side of your belly. It can also be felt in your hips or groin. Follow-up care is a key part of your treatment and safety. Be sure to make and go to all appointments, and call your doctor if you are having problems. It's also a good idea to know your test results and keep a list of the medicines you take. How can you tell if belly pain is a sign of labor? When belly pain is caused by labor, it can feel like mild or menstrual-like cramps in your lower belly. These cramps are probably contractions. They can happen in your second or third trimester. You may also have: · A steady, dull ache in your lower back, pelvis, or thighs. · A feeling of pressure in your pelvis or lower belly. · Changes in your vaginal discharge or a sudden release of fluid from the vagina. If you think you are in labor, call your doctor. How can you care for yourself at home? When belly pain is mild and is not a symptom of labor: · Rest until you feel better. · Take a warm bath. · Think about what you drink and eat: ¨ Drink plenty of fluids. Choose water and other caffeine-free clear liquids until you feel better. ¨ Try eating small, frequent meals. If your stomach is upset, try bland, low-fat foods like plain rice, broiled chicken, toast, and yogurt. · Think about how you move if you are having brief pains from stretching of the round ligaments. ¨ Try gentle stretching. ¨ Move a little more slowly when turning in bed or getting up from a chair, so those ligaments don't stretch quickly. ¨ Lean forward a bit if you think you are going to cough or sneeze. When should you call for help? Call 911 anytime you think you may need emergency care. For example, call if: 
? · You have sudden, severe pain in your belly. ? · You have severe vaginal bleeding. ?Call your doctor now or seek immediate medical care if: 
? · You have new or worse belly pain or cramping. ? · You have any vaginal bleeding. ? · You have a fever. ? · You have symptoms of preeclampsia, such as: 
¨ Sudden swelling of your face, hands, or feet. ¨ New vision problems (such as dimness or blurring). ¨ A severe headache. ? · You think that you may be in labor. This means that you've had at least 8 contractions within 1 hour or at least 4 contractions within 20 minutes, even after you change your position and drink fluids. ? · You have symptoms of a urinary tract infection. These may include: 
¨ Pain or burning when you urinate. ¨ A frequent need to urinate without being able to pass much urine. ¨ Pain in the flank, which is just below the rib cage and above the waist on either side of the back. ¨ Blood in your urine. ? Watch closely for changes in your health, and be sure to contact your doctor if you are worried about your or your baby's health. Where can you learn more? Go to http://robbie-zehra.info/. Enter [phone] in the search box to learn more about \"Belly Pain in Pregnancy: Care Instructions. \" Current as of: March 16, 2017 Content Version: 11.4 © 4372-0450 [employer]. Care instructions adapted under license by [employer] (which disclaims liability or warranty for this information). If you have questions about a medical condition or this instruction, always ask your healthcare professional. [place] [address] any warranty or liability for your use of this information. Weeks 22 to 26 of Your Pregnancy: Care Instructions Your Care Instructions As you enter your 7th month of pregnancy at week 26, your baby's lungs are growing stronger and getting ready to breathe. You may notice that your baby responds to the sound of your or your partner's voice. You may also notice that your baby does less turning and twisting and more squirming or jerking. Jerking often means that your baby has the hiccups. Hiccups are perfectly normal and are only temporary. You may want to think about attending a childbirth preparation class. This is also a good time to start thinking about whether you want to have pain medicine during labor. Most pregnant women are tested for gestational diabetes between weeks 25 and 28. Gestational diabetes occurs when your blood sugar level gets too high when you're pregnant. The test is important, because you can have gestational diabetes and not know it. But the condition can cause problems for your baby. Follow-up care is a key part of your treatment and safety. Be sure to make and go to all appointments, and call your doctor if you are having problems. It's also a good idea to know your test results and keep a list of the medicines you take. How can you care for yourself at home? Ease discomfort from your baby's kicking · Change your position.  Sometimes this will cause your baby to change position too. · Take a deep breath while you raise your arm over your head. Then breathe out while you drop your arm. Do Kegel exercises to prevent urine from leaking · You can do Kegel exercises while you stand or sit. ¨ Squeeze the same muscles you would use to stop your urine. Your belly and thighs should not move. ¨ Hold the squeeze for 3 seconds, and then relax for 3 seconds. ¨ Start with 3 seconds. Then add 1 second each week until you are able to squeeze for 10 seconds. ¨ Repeat the exercise 10 to 15 times for each session. Do three or more sessions each day. Ease or reduce swelling in your feet, ankles, hands, and fingers · If your fingers are puffy, take off your rings. · Do not eat high-salt foods, such as potato chips. · Prop up your feet on a stool or couch as much as possible. Sleep with pillows under your feet. · Do not stand for long periods of time or wear tight shoes. · Wear support stockings. Where can you learn more? Go to http://robbie-zehra.info/. Enter G264 in the search box to learn more about \"Weeks 22 to 26 of Your Pregnancy: Care Instructions. \" Current as of: March 16, 2017 Content Version: 11.4 © 4679-3825 [employer]. Care instructions adapted under license by [employer] (which disclaims liability or warranty for this information). If you have questions about a medical condition or this instruction, always ask your healthcare professional. [place] [address] any warranty or liability for your use of this information. Belly Pain in Pregnancy: Care Instructions Your Care Instructions When you're pregnant, any belly pain can be a worry. You may not want to call your doctor about every pain you have. But you don't want to miss something that is dangerous for you or your baby. Even if it feels familiar, belly pain can mean something new when you're pregnant. It's important to know when to call your doctor. It will also help to know how to care for yourself at home when your pain is not caused by anything harmful. · When belly pain is more severe or constant, see a doctor right away. · If you're sure your belly pain is a sign of labor, call your doctor. · When belly pain is brief, it's usually a normal part of pregnancy. It might be related to changes in the growing uterus. Or it could be the stretching of ligaments called round ligaments. These ligaments help support the uterus. Round ligament pain can be on either side of your belly. It can also be felt in your hips or groin. Follow-up care is a key part of your treatment and safety. Be sure to make and go to all appointments, and call your doctor if you are having problems. It's also a good idea to know your test results and keep a list of the medicines you take. How can you tell if belly pain is a sign of labor? When belly pain is caused by labor, it can feel like mild or menstrual-like cramps in your lower belly. These cramps are probably contractions. They can happen in your second or third trimester. You may also have: · A steady, dull ache in your lower back, pelvis, or thighs. · A feeling of pressure in your pelvis or lower belly. · Changes in your vaginal discharge or a sudden release of fluid from the vagina. If you think you are in labor, call your doctor. How can you care for yourself at home? When belly pain is mild and is not a symptom of labor: · Rest until you feel better. · Take a warm bath. · Think about what you drink and eat: ¨ Drink plenty of fluids. Choose water and other caffeine-free clear liquids until you feel better. ¨ Try eating small, frequent meals. If your stomach is upset, try bland, low-fat foods like plain rice, broiled chicken, toast, and yogurt. · Think about how you move if you are having brief pains from stretching of the round ligaments. ¨ Try gentle stretching. ¨ Move a little more slowly when turning in bed or getting up from a chair, so those ligaments don't stretch quickly. ¨ Lean forward a bit if you think you are going to cough or sneeze. When should you call for help? Call 911 anytime you think you may need emergency care. For example, call if: 
? · You have sudden, severe pain in your belly. ? · You have severe vaginal bleeding. ?Call your doctor now or seek immediate medical care if: 
? · You have new or worse belly pain or cramping. ? · You have any vaginal bleeding. ? · You have a fever. ? · You have symptoms of preeclampsia, such as: 
¨ Sudden swelling of your face, hands, or feet. ¨ New vision problems (such as dimness or blurring). ¨ A severe headache. ? · You think that you may be in labor. This means that you've had at least 8 contractions within 1 hour or at least 4 contractions within 20 minutes, even after you change your position and drink fluids. ? · You have symptoms of a urinary tract infection. These may include: 
¨ Pain or burning when you urinate. ¨ A frequent need to urinate without being able to pass much urine. ¨ Pain in the flank, which is just below the rib cage and above the waist on either side of the back. ¨ Blood in your urine. ? Watch closely for changes in your health, and be sure to contact your doctor if you are worried about your or your baby's health. Where can you learn more? Go to http://robbie-zehra.info/. Enter [phone] in the search box to learn more about \"Belly Pain in Pregnancy: Care Instructions. \" Current as of: March 16, 2017 Content Version: 11.4 © 0928-5918 [employer]. Care instructions adapted under license by [employer] (which disclaims liability or warranty for this information).  If you have questions about a medical condition or this instruction, always ask your healthcare professional. [employer] disclaims any warranty or liability for your use of this information. Call the doctor on Monday Nov 13, 2017 to let them know you were seen in the hospital over the weekend. Introducing hospitals & HEALTH SERVICES! Dear Meg Neff: Thank you for requesting a [employer] account. Our records indicate that you already have an active [employer] account. You can access your account anytime at [email or web address] Did you know that you can access your hospital and ER discharge instructions at any time in [employer]? You can also review all of your test results from your hospital stay or ER visit. Additional Information If you have questions, please visit the Frequently Asked Questions section of the [employer] website at [email or web address] Remember, [employer] is NOT to be used for urgent needs. For medical emergencies, dial 911. Now available from your iPhone and Android! Providers Seen During Your Hospitalization Provider Specialty Primary office phone Estuardo Brush DO Emergency Medicine [phone] Uday Corrales MD Obstetrics & Gynecology [phone] Your Primary Care Physician (PCP) Primary Care Physician Office Phone Office Fax Tarun Rees [phone] You are allergic to the following Allergen Reactions Penicillins Hives Recent Documentation Height Weight BMI OB Status Smoking Status 1.6 m 55.7 kg 21.75 kg/m2 Pregnant Never Smoker Emergency Contacts Name Discharge Info Relation Home Work [employer] 68 CAREGIVER [3] Parent [1] [phone] Patient Belongings The following personal items are in your possession at time of discharge: 
                             
 
  
  
 Please provide this summary of care documentation to your next provider.  
  
  
 
  
Signatures-by signing, you are acknowledging that this After Visit Summary has been reviewed with you and you have received a copy. Patient Signature:  ____________________________________________________________ Date:  ____________________________________________________________  
  
Gwenyth Miles Provider Signature:  ____________________________________________________________ Date:  ____________________________________________________________

## 2017-11-12 NOTE — DISCHARGE INSTRUCTIONS
Pelvic Pain: Care Instructions  Your Care Instructions    Pelvic pain, or pain in the lower belly, can have many causes. Often pelvic pain is not serious and gets better in a few days. If your pain continues or gets worse, you may need tests and treatment. Tell your doctor about any new symptoms. These may be signs of a serious problem. Follow-up care is a key part of your treatment and safety. Be sure to make and go to all appointments, and call your doctor if you are having problems. It's also a good idea to know your test results and keep a list of the medicines you take. How can you care for yourself at home? · Rest until you feel better. Lie down, and raise your legs by placing a pillow under your knees. · Drink plenty of fluids. You may find that small, frequent sips are easier on your stomach than if you drink a lot at once. Avoid drinks with carbonation or caffeine, such as soda pop, tea, or coffee. · Try eating several small meals instead of 2 or 3 large ones. Eat mild foods, such as rice, dry toast or crackers, bananas, and applesauce. Avoid fatty and spicy foods, other fruits, and alcohol until 48 hours after your symptoms have gone away. · Take an over-the-counter pain medicine, such as acetaminophen (Tylenol), ibuprofen (Advil, Motrin), or naproxen (Aleve). Read and follow all instructions on the label. · Do not take two or more pain medicines at the same time unless the doctor told you to. Many pain medicines have acetaminophen, which is Tylenol. Too much acetaminophen (Tylenol) can be harmful. · You can put a heating pad, a warm cloth, or moist heat on your belly to relieve pain. When should you call for help? Call your doctor now or seek immediate medical care if:  · You have a new or higher fever. · You have unusual vaginal bleeding. · You have new or worse belly or pelvic pain. · You have vaginal discharge that has increased in amount or smells bad.   Watch closely for changes in your health, and be sure to contact your doctor if:  · You do not get better as expected. Where can you learn more? Go to http://robbie-zehra.info/. Enter [phone] in the search box to learn more about \"Pelvic Pain: Care Instructions. \"  Current as of: October 13, 2016  Content Version: 11.4  © 4615-2000 [employer]. Care instructions adapted under license by [employer] (which disclaims liability or warranty for this information). If you have questions about a medical condition or this instruction, always ask your healthcare professional. [place] [address] any warranty or liability for your use of this information. Belly Pain in Pregnancy: Care Instructions  Your Care Instructions    When you're pregnant, any belly pain can be a worry. You may not want to call your doctor about every pain you have. But you don't want to miss something that is dangerous for you or your baby. Even if it feels familiar, belly pain can mean something new when you're pregnant. It's important to know when to call your doctor. It will also help to know how to care for yourself at home when your pain is not caused by anything harmful. · When belly pain is more severe or constant, see a doctor right away. · If you're sure your belly pain is a sign of labor, call your doctor. · When belly pain is brief, it's usually a normal part of pregnancy. It might be related to changes in the growing uterus. Or it could be the stretching of ligaments called round ligaments. These ligaments help support the uterus. Round ligament pain can be on either side of your belly. It can also be felt in your hips or groin. Follow-up care is a key part of your treatment and safety. Be sure to make and go to all appointments, and call your doctor if you are having problems. It's also a good idea to know your test results and keep a list of the medicines you take.   How can you tell if belly pain is a sign of labor? When belly pain is caused by labor, it can feel like mild or menstrual-like cramps in your lower belly. These cramps are probably contractions. They can happen in your second or third trimester. You may also have:  · A steady, dull ache in your lower back, pelvis, or thighs. · A feeling of pressure in your pelvis or lower belly. · Changes in your vaginal discharge or a sudden release of fluid from the vagina. If you think you are in labor, call your doctor. How can you care for yourself at home? When belly pain is mild and is not a symptom of labor:  · Rest until you feel better. · Take a warm bath. · Think about what you drink and eat:  ¨ Drink plenty of fluids. Choose water and other caffeine-free clear liquids until you feel better. ¨ Try eating small, frequent meals. If your stomach is upset, try bland, low-fat foods like plain rice, broiled chicken, toast, and yogurt. · Think about how you move if you are having brief pains from stretching of the round ligaments. ¨ Try gentle stretching. ¨ Move a little more slowly when turning in bed or getting up from a chair, so those ligaments don't stretch quickly. ¨ Lean forward a bit if you think you are going to cough or sneeze. When should you call for help? Call 911 anytime you think you may need emergency care. For example, call if:  ? · You have sudden, severe pain in your belly. ? · You have severe vaginal bleeding. ?Call your doctor now or seek immediate medical care if:  ? · You have new or worse belly pain or cramping. ? · You have any vaginal bleeding. ? · You have a fever. ? · You have symptoms of preeclampsia, such as:  ¨ Sudden swelling of your face, hands, or feet. ¨ New vision problems (such as dimness or blurring). ¨ A severe headache. ? · You think that you may be in labor.  This means that you've had at least 8 contractions within 1 hour or at least 4 contractions within 20 minutes, even after you change your position and drink fluids. ? · You have symptoms of a urinary tract infection. These may include:  ¨ Pain or burning when you urinate. ¨ A frequent need to urinate without being able to pass much urine. ¨ Pain in the flank, which is just below the rib cage and above the waist on either side of the back. ¨ Blood in your urine. ? Watch closely for changes in your health, and be sure to contact your doctor if you are worried about your or your baby's health. Where can you learn more? Go to http://robbie-zehra.info/. Enter [phone] in the search box to learn more about \"Belly Pain in Pregnancy: Care Instructions. \"  Current as of: March 16, 2017  Content Version: 11.4  © 1862-6948 [employer]. Care instructions adapted under license by [employer] (which disclaims liability or warranty for this information). If you have questions about a medical condition or this instruction, always ask your healthcare professional. [place] [address] any warranty or liability for your use of this information. Weeks 22 to 26 of Your Pregnancy: Care Instructions  Your Care Instructions    As you enter your 7th month of pregnancy at week 26, your baby's lungs are growing stronger and getting ready to breathe. You may notice that your baby responds to the sound of your or your partner's voice. You may also notice that your baby does less turning and twisting and more squirming or jerking. Jerking often means that your baby has the hiccups. Hiccups are perfectly normal and are only temporary. You may want to think about attending a childbirth preparation class. This is also a good time to start thinking about whether you want to have pain medicine during labor. Most pregnant women are tested for gestational diabetes between weeks 25 and 28. Gestational diabetes occurs when your blood sugar level gets too high when you're pregnant.  The test is important, because you can have gestational diabetes and not know it. But the condition can cause problems for your baby. Follow-up care is a key part of your treatment and safety. Be sure to make and go to all appointments, and call your doctor if you are having problems. It's also a good idea to know your test results and keep a list of the medicines you take. How can you care for yourself at home? Ease discomfort from your baby's kicking  · Change your position. Sometimes this will cause your baby to change position too. · Take a deep breath while you raise your arm over your head. Then breathe out while you drop your arm. Do Kegel exercises to prevent urine from leaking  · You can do Kegel exercises while you stand or sit. ¨ Squeeze the same muscles you would use to stop your urine. Your belly and thighs should not move. ¨ Hold the squeeze for 3 seconds, and then relax for 3 seconds. ¨ Start with 3 seconds. Then add 1 second each week until you are able to squeeze for 10 seconds. ¨ Repeat the exercise 10 to 15 times for each session. Do three or more sessions each day. Ease or reduce swelling in your feet, ankles, hands, and fingers  · If your fingers are puffy, take off your rings. · Do not eat high-salt foods, such as potato chips. · Prop up your feet on a stool or couch as much as possible. Sleep with pillows under your feet. · Do not stand for long periods of time or wear tight shoes. · Wear support stockings. Where can you learn more? Go to http://robbie-zehra.info/. Enter G264 in the search box to learn more about \"Weeks 22 to 26 of Your Pregnancy: Care Instructions. \"  Current as of: March 16, 2017  Content Version: 11.4  © 8018-4813 [employer]. Care instructions adapted under license by [employer] (which disclaims liability or warranty for this information).  If you have questions about a medical condition or this instruction, always ask your healthcare professional. [address] any warranty or liability for your use of this information. Belly Pain in Pregnancy: Care Instructions  Your Care Instructions    When you're pregnant, any belly pain can be a worry. You may not want to call your doctor about every pain you have. But you don't want to miss something that is dangerous for you or your baby. Even if it feels familiar, belly pain can mean something new when you're pregnant. It's important to know when to call your doctor. It will also help to know how to care for yourself at home when your pain is not caused by anything harmful. · When belly pain is more severe or constant, see a doctor right away. · If you're sure your belly pain is a sign of labor, call your doctor. · When belly pain is brief, it's usually a normal part of pregnancy. It might be related to changes in the growing uterus. Or it could be the stretching of ligaments called round ligaments. These ligaments help support the uterus. Round ligament pain can be on either side of your belly. It can also be felt in your hips or groin. Follow-up care is a key part of your treatment and safety. Be sure to make and go to all appointments, and call your doctor if you are having problems. It's also a good idea to know your test results and keep a list of the medicines you take. How can you tell if belly pain is a sign of labor? When belly pain is caused by labor, it can feel like mild or menstrual-like cramps in your lower belly. These cramps are probably contractions. They can happen in your second or third trimester. You may also have:  · A steady, dull ache in your lower back, pelvis, or thighs. · A feeling of pressure in your pelvis or lower belly. · Changes in your vaginal discharge or a sudden release of fluid from the vagina. If you think you are in labor, call your doctor. How can you care for yourself at home?   When belly pain is mild and is not a symptom of labor:  · Rest until you feel better. · Take a warm bath. · Think about what you drink and eat:  ¨ Drink plenty of fluids. Choose water and other caffeine-free clear liquids until you feel better. ¨ Try eating small, frequent meals. If your stomach is upset, try bland, low-fat foods like plain rice, broiled chicken, toast, and yogurt. · Think about how you move if you are having brief pains from stretching of the round ligaments. ¨ Try gentle stretching. ¨ Move a little more slowly when turning in bed or getting up from a chair, so those ligaments don't stretch quickly. ¨ Lean forward a bit if you think you are going to cough or sneeze. When should you call for help? Call 911 anytime you think you may need emergency care. For example, call if:  ? · You have sudden, severe pain in your belly. ? · You have severe vaginal bleeding. ?Call your doctor now or seek immediate medical care if:  ? · You have new or worse belly pain or cramping. ? · You have any vaginal bleeding. ? · You have a fever. ? · You have symptoms of preeclampsia, such as:  ¨ Sudden swelling of your face, hands, or feet. ¨ New vision problems (such as dimness or blurring). ¨ A severe headache. ? · You think that you may be in labor. This means that you've had at least 8 contractions within 1 hour or at least 4 contractions within 20 minutes, even after you change your position and drink fluids. ? · You have symptoms of a urinary tract infection. These may include:  ¨ Pain or burning when you urinate. ¨ A frequent need to urinate without being able to pass much urine. ¨ Pain in the flank, which is just below the rib cage and above the waist on either side of the back. ¨ Blood in your urine. ? Watch closely for changes in your health, and be sure to contact your doctor if you are worried about your or your baby's health. Where can you learn more? Go to http://robbie-zehra.info/.   Enter [id number] in the search box to learn more about \"Belly Pain in Pregnancy: Care Instructions. \"  Current as of: March 16, 2017  Content Version: 11.4  © 7521-6989 [employer]. Care instructions adapted under license by [employer] (which disclaims liability or warranty for this information). If you have questions about a medical condition or this instruction, always ask your healthcare professional. [place] [address] any warranty or liability for your use of this information. Call the doctor on Monday Nov 13, 2017 to let them know you were seen in the hospital over the weekend.

## 2017-11-12 NOTE — ED NOTES
Bedside shift change report given to Jose Salter RN (oncoming nurse) by EFREM Sierra (offgoing nurse). Report included the following information SBAR, ED Summary, MAR and Recent Results.

## 2017-11-12 NOTE — PROGRESS NOTES
Pt is here from the ER with complaints of right lower abdomen pain. Pt of Dr. Yajaira Sullivan and is about 24 weeks pregnant. 2100 Dr. Curtis Baer at bedside speaking to patient. 2134 pt states that when she lays down she does not feel the pain but when she stands up she does. 2135 Dr. Curtis Baer reviewed strip and stated that patient can be discharged home. 2140 D/C instructions given and reviewed. Pt stated an understanding and [facility]      2146 Pt discharged home, off unit at this time

## 2017-11-12 NOTE — ED PROVIDER NOTES
[facility].  EMERGENCY DEPARTMENT HISTORY AND PHYSICAL EXAM         Date of Service: 11/11/2017   Patient Name: Forest Pina   YOB: 1995  Medical Record Number: [id number]    History of Presenting Illness     Chief Complaint   Patient presents with    Abdominal Pain     right lower quadrant onset last night. no vaginal discharge no vaginal bleeding    Urinary Pain     pressure pain rlq when she voids but when she walks it's stabbing pain. pt is pregnant. 24 weeks. History Provided By:  patient    Additional History:   Forest Pina is a 25 y.o. female who is 24 weeks pregnant with PMhx significant for GERD who presents ambulatory to the ED with cc of constant, moderate RLQ abdominal pain since last night, along with associated pressure with urination. Pt states the pain is worse when walking. Pt denied fever/chills. Pt adds that she has been constipated recently, and she has been taking Colace. She denies any vaginal bleeding, vaginal discharge, nausea, vomiting, change in appetite, or contractions. Social Hx: - Tobacco, - EtOH, - Illicit Drugs    There are no other complaints, changes or physical findings at this time. Primary Care Provider: Jazmin Antonio MD   OBGYN: Dr Dane Tobias    Past History     Past Medical History:   Past Medical History:   Diagnosis Date    Endocrine disease     hypoglycemia    Gastrointestinal disorder     GERD    Hypokalemia     Ill-defined condition     kidney failure    Panic attacks     Psychiatric disorder     anxiety, panic disorder        Past Surgical History:   No past surgical history on file. Family History:   Family History   Problem Relation Age of Onset   [employer] Mother         Social History:   Social History   Substance Use Topics    Smoking status: Never Smoker    Smokeless tobacco: Never Used    Alcohol use No        Allergies:    Allergies   Allergen Reactions    Penicillins Hives Review of Systems   Review of Systems   Constitutional: Negative for chills and fever. HENT: Negative for congestion, rhinorrhea, sneezing and sore throat. Eyes: Negative for redness and visual disturbance. Respiratory: Negative for shortness of breath. Cardiovascular: Negative for leg swelling. Gastrointestinal: Positive for abdominal pain and constipation. Negative for nausea and vomiting. Genitourinary: Positive for dysuria. Negative for difficulty urinating and frequency. Musculoskeletal: Negative for back pain, myalgias and neck stiffness. Skin: Negative for rash. Neurological: Negative for dizziness, syncope, weakness and headaches. Hematological: Negative for adenopathy. Physical Exam  Physical Exam   Constitutional: She is oriented to person, place, and time. She appears well-developed and well-nourished. No distress. HENT:   Head: Normocephalic and atraumatic. Nose: Nose normal.   Mouth/Throat: Oropharynx is clear and moist. No oropharyngeal exudate. Eyes: Conjunctivae and EOM are normal. Right eye exhibits no discharge. Left eye exhibits no discharge. No scleral icterus. Neck: Normal range of motion. Neck supple. No JVD present. No tracheal deviation present. No thyromegaly present. Cardiovascular: Normal rate, regular rhythm and normal heart sounds. Pulmonary/Chest: Effort normal and breath sounds normal. No respiratory distress. She has no wheezes. Abdominal: Soft. There is no tenderness. Gravid uterus. Musculoskeletal: Normal range of motion. She exhibits no edema. Lymphadenopathy:     She has no cervical adenopathy. Neurological: She is alert and oriented to person, place, and time. She exhibits normal muscle tone. Coordination normal.   Skin: Skin is warm and dry. She is not diaphoretic. Psychiatric: She has a normal mood and affect. Her behavior is normal. Judgment normal.   Nursing note and vitals reviewed.       Medical Decision Making   I am the first provider for this patient. I reviewed the vital signs, available nursing notes, past medical history, past surgical history, family history and social history. Old Medical Records: Old medical records. Provider Notes:   DDx: UTI, round ligament pain     ED Course:  7:03 PM   Initial assessment performed. The patients presenting problems have been discussed, and they are in agreement with the care plan formulated and outlined with them. FHT obtained: 150. Awaiting urine results. Will contact Ob Hospitalist after complete. I have encouraged them to ask questions as they arise throughout their visit. Consult Note  8:22 PM  PA-C Kin  spoke with Dr. Dee Mccray,   Specialty: OBGYN  Discussed pt's hx, disposition, and available diagnostic and imaging results. Reviewed care plans. Consultant recommends plan as outlined: sent the patient to the Labor and Delivery Department. Diagnostic Study Results   Labs -      Recent Results (from the past 12 hour(s))   URINALYSIS W/ REFLEX CULTURE    Collection Time: 11/11/17  7:00 PM   Result Value Ref Range    Color DARK YELLOW      Appearance CLEAR CLEAR      Specific gravity 1.025 1.003 - 1.030      pH (UA) 6.5 5.0 - 8.0      Protein NEGATIVE  NEG mg/dL    Glucose NEGATIVE  NEG mg/dL    Ketone 15 (A) NEG mg/dL    Blood NEGATIVE  NEG      Urobilinogen 4.0 (H) 0.2 - 1.0 EU/dL    Nitrites NEGATIVE  NEG      Leukocyte Esterase TRACE (A) NEG      WBC 0-4 0 - 4 /hpf    RBC 0-5 0 - 5 /hpf    Epithelial cells FEW FEW /lpf    Bacteria NEGATIVE  NEG /hpf    UA:UC IF INDICATED CULTURE NOT INDICATED BY UA RESULT CNI     BILIRUBIN, CONFIRM    Collection Time: 11/11/17  7:00 PM   Result Value Ref Range    Bilirubin UA, confirm NEGATIVE  NEG       Vital Signs-Reviewed the patient's vital signs.    Patient Vitals for the past 12 hrs:   Temp Pulse Resp BP SpO2   11/11/17 1827 98.4 °F (36.9 °C) 97 16 132/65 99 %       Medications Given in the ED:  Medications - No data to display    Diagnosis:  Clinical Impression:   1. Pelvic pain affecting pregnancy in third trimester, antepartum         Plan:  1: Send to L&D    Disposition:    DISCHARGE NOTE  8:22 PM  The patient has been re-evaluated and is ready for discharge. Reviewed available results with patient. Counseled pt on diagnosis and care plan. Pt has expressed understanding, and all questions have been answered. Pt agrees with plan and agrees to follow up as recommended, or return to the ED if their symptoms worsen. Discharge instructions have been provided and explained to the pt, along with reasons to return to the ED. Attestations: This note is prepared by Jez Ferraro, acting as Scribe for [facility]. RYAN Archer: The scribe's documentation has been prepared under my direction and personally reviewed by me in its entirety. I confirm that the note above accurately reflects all work, treatment, procedures, and medical decision making performed by me.

## 2017-11-12 NOTE — ED NOTES
Assumed care of patient from triage. Patient is A&Ox4, respirations even and unlabored. Pt. States she has RLQ pain and pressure while walking as well as pressure while urinating. Patient denies n/v/d. Patient is 24 weeks pregnant, no complications with pregnancy. A0. Pt. Saskia Lee in low bed in POC, side rail x1, monitor x2, call light within reach.

## 2017-12-22 LAB
CHLAMYDIA, EXTERNAL: NEGATIVE
N. GONORRHEA, EXTERNAL: NEGATIVE

## 2018-01-01 ENCOUNTER — HOSPITAL ENCOUNTER (EMERGENCY)
Age: 23
Discharge: HOME | End: 2018-01-01
Attending: EMERGENCY MEDICINE
Payer: MEDICAID

## 2018-01-01 VITALS
HEART RATE: 79 BPM | DIASTOLIC BLOOD PRESSURE: 69 MMHG | HEIGHT: 62 IN | SYSTOLIC BLOOD PRESSURE: 132 MMHG | WEIGHT: 133.6 LBS | TEMPERATURE: 98.4 F | BODY MASS INDEX: 24.59 KG/M2 | RESPIRATION RATE: 16 BRPM | OXYGEN SATURATION: 100 %

## 2018-01-01 DIAGNOSIS — B34.9 VIRAL SYNDROME: Primary | ICD-10-CM

## 2018-01-01 DIAGNOSIS — Z3A.31 31 WEEKS GESTATION OF PREGNANCY: ICD-10-CM

## 2018-01-01 DIAGNOSIS — J02.9 SORE THROAT: ICD-10-CM

## 2018-01-01 DIAGNOSIS — R19.7 DIARRHEA, UNSPECIFIED TYPE: ICD-10-CM

## 2018-01-01 LAB
APPEARANCE UR: CLEAR
BACTERIA URNS QL MICRO: NEGATIVE /HPF
BILIRUB UR QL: NEGATIVE
COLOR UR: ABNORMAL
DEPRECATED S PYO AG THROAT QL EIA: NEGATIVE
EPITH CASTS URNS QL MICRO: ABNORMAL /LPF
GLUCOSE UR STRIP.AUTO-MCNC: NEGATIVE MG/DL
HGB UR QL STRIP: NEGATIVE
HYALINE CASTS URNS QL MICRO: ABNORMAL /LPF (ref 0–5)
KETONES UR QL STRIP.AUTO: NEGATIVE MG/DL
LEUKOCYTE ESTERASE UR QL STRIP.AUTO: ABNORMAL
NITRITE UR QL STRIP.AUTO: NEGATIVE
PH UR STRIP: 6.5 [PH] (ref 5–8)
PROT UR STRIP-MCNC: NEGATIVE MG/DL
RBC #/AREA URNS HPF: ABNORMAL /HPF (ref 0–5)
SP GR UR REFRACTOMETRY: 1.01 (ref 1–1.03)
UA: UC IF INDICATED,UAUC: ABNORMAL
UROBILINOGEN UR QL STRIP.AUTO: 0.2 EU/DL (ref 0.2–1)
WBC URNS QL MICRO: ABNORMAL /HPF (ref 0–4)

## 2018-01-01 PROCEDURE — 87070 CULTURE OTHR SPECIMN AEROBIC: CPT | Performed by: EMERGENCY MEDICINE

## 2018-01-01 PROCEDURE — 99284 EMERGENCY DEPT VISIT MOD MDM: CPT

## 2018-01-01 PROCEDURE — 81001 URINALYSIS AUTO W/SCOPE: CPT | Performed by: PHYSICIAN ASSISTANT

## 2018-01-01 PROCEDURE — 87086 URINE CULTURE/COLONY COUNT: CPT | Performed by: PHYSICIAN ASSISTANT

## 2018-01-01 PROCEDURE — 74011250637 HC RX REV CODE- 250/637: Performed by: PHYSICIAN ASSISTANT

## 2018-01-01 PROCEDURE — 87880 STREP A ASSAY W/OPTIC: CPT | Performed by: PHYSICIAN ASSISTANT

## 2018-01-01 PROCEDURE — 74011250636 HC RX REV CODE- 250/636: Performed by: PHYSICIAN ASSISTANT

## 2018-01-01 RX ORDER — ACETAMINOPHEN 325 MG/1
650 TABLET ORAL
Status: COMPLETED | OUTPATIENT
Start: 2018-01-01 | End: 2018-01-01

## 2018-01-01 RX ORDER — ONDANSETRON 4 MG/1
4 TABLET, ORALLY DISINTEGRATING ORAL
Status: COMPLETED | OUTPATIENT
Start: 2018-01-01 | End: 2018-01-01

## 2018-01-01 RX ORDER — ONDANSETRON 2 MG/ML
4 INJECTION INTRAMUSCULAR; INTRAVENOUS
Status: DISCONTINUED | OUTPATIENT
Start: 2018-01-01 | End: 2018-01-01 | Stop reason: HOSPADM

## 2018-01-01 RX ORDER — METRONIDAZOLE 250 MG/1
500 TABLET ORAL 3 TIMES DAILY
COMMUNITY
End: 2018-08-24

## 2018-01-01 RX ADMIN — ACETAMINOPHEN 650 MG: 325 TABLET ORAL at 09:47

## 2018-01-01 RX ADMIN — ONDANSETRON 4 MG: 4 TABLET, ORALLY DISINTEGRATING ORAL at 09:59

## 2018-01-01 RX ADMIN — SODIUM CHLORIDE 1000 ML: 900 INJECTION, SOLUTION INTRAVENOUS at 09:47

## 2018-01-01 NOTE — ED NOTES
Karina Espinoza PA-C reviewed discharge instructions with the patient. The patient verbalized understanding. All questions and concerns were addressed. The patient declined a wheelchair and is discharged ambulatory in the care of family members with instructions and prescriptions in hand. Pt is alert and oriented x 4. Respirations are clear and unlabored.

## 2018-01-01 NOTE — ED PROVIDER NOTES
EMERGENCY DEPARTMENT HISTORY AND PHYSICAL EXAM      Date: 2018  Patient Name: Sanju Lovell    History of Presenting Illness     Chief Complaint   Patient presents with    Sore Throat     Today    Diarrhea     Onset today       History Provided By: Patient    HPI: Sanju Lovell, 25 y.o. female () with PMHx significant for hypoglycemia, panic attacks, anxiety, GERD, hypokalemia, kidney failure, chlamydia, and trichomonal infection, presents ambulatory to the ED with cc of a constant sore throat which started this morning at 0300. Pt also c/o 4 episodes of diarrhea starting at 0600 and abdominal cramping. She notes her associated pain is moderate. Pt is currently 31 weeks and 4 days pregnant. She has an OB/GYN. Pt states her mother is sick with vomiting and fever. Pt denies any recent contact with anyone complaining of a sore throat. She denies nausea, vomiting, fever, rhinorrhea, ear pain, cough, dysuria, hematuria, and urinary frequency. PCP: Judy Morin MD   OB/GYN: Dr. Amy Lamb     There are no other complaints, changes, or physical findings at this time. Current Facility-Administered Medications   Medication Dose Route Frequency Provider Last Rate Last Dose    ondansetron (ZOFRAN) injection 4 mg  4 [facility] NOW [facility]        [DISCONTINUED] sodium chloride 0.9 % bolus infusion 1,000 mL  1,000 mL IntraVENous NOW JOVANNY Torres 1,000 mL/hr at 18 0947 1,000 mL at 18 0947     Current Outpatient Prescriptions   Medication Sig Dispense Refill    metroNIDAZOLE (FLAGYL) 250 mg tablet Take 500 mg by mouth three (3) times daily. For bacterial vaginosis, since 17      PNV No12-Iron-FA-DSS-OM-3 29 mg iron-1 mg -50 mg CPKD Take  by mouth.          Past History     Past Medical History:  Past Medical History:   Diagnosis Date    Chlamydia     Endocrine disease     hypoglycemia    Gastrointestinal disorder     GERD    Hypokalemia     Ill-defined condition     kidney failure    Panic attacks     Psychiatric disorder     anxiety, panic disorder    Trichomonal infection        Past Surgical History:  History reviewed. No pertinent surgical history. Family History:  Family History   Problem Relation Age of Onset   [facility] Mother        Social History:  Social History   Substance Use Topics    Smoking status: Never Smoker    Smokeless tobacco: Never Used    Alcohol use No       Allergies: Allergies   Allergen Reactions    Penicillins Hives         Review of Systems   Review of Systems   Constitutional: Negative. Negative for fever. HENT: Positive for sore throat. Negative for ear pain and rhinorrhea. Eyes: Negative. Respiratory: Negative. Negative for cough. Cardiovascular: Negative. Gastrointestinal: Positive for abdominal pain (cramping) and diarrhea. Negative for nausea and vomiting. Genitourinary: Negative. Negative for dysuria, frequency, hematuria, vaginal bleeding, vaginal discharge and vaginal pain. Musculoskeletal: Negative. Skin: Negative. Neurological: Negative. All other systems reviewed and are negative. Physical Exam   Physical Exam   Constitutional: She is oriented to person, place, and time. She appears well-developed and well-nourished. No distress. 26 yo -American female   HENT:   Head: Normocephalic and atraumatic. Right Ear: Tympanic membrane, external ear and ear canal normal. No middle ear effusion. Left Ear: Tympanic membrane, external ear and ear canal normal.  No middle ear effusion. Nose: Nose normal. No rhinorrhea. Right sinus exhibits no maxillary sinus tenderness and no frontal sinus tenderness. Left sinus exhibits no maxillary sinus tenderness and no frontal sinus tenderness. Mouth/Throat: Uvula is midline, oropharynx is clear and moist and mucous membranes are normal. No oropharyngeal exudate, posterior oropharyngeal edema or posterior oropharyngeal erythema. Eyes: Conjunctivae are normal. Right eye exhibits no discharge. Left eye exhibits no discharge. Neck: Normal range of motion. Neck supple. Cardiovascular: Normal rate, regular rhythm and normal heart sounds. No murmur heard. Pulmonary/Chest: Effort normal and breath sounds normal. No respiratory distress. She has no wheezes. Abdominal: There is no tenderness. Gravid. Lymphadenopathy:     She has no cervical adenopathy. Neurological: She is alert and oriented to person, place, and time. Skin: Skin is warm and dry. She is not diaphoretic. Psychiatric: She has a normal mood and affect. Her behavior is normal.   Nursing note and vitals reviewed. Diagnostic Study Results     Labs -     Recent Results (from the past 12 hour(s))   URINALYSIS W/ REFLEX CULTURE    Collection Time: 01/01/18  9:05 AM   Result Value Ref Range    Color YELLOW/STRAW      Appearance CLEAR CLEAR      Specific gravity 1.011 1.003 - 1.030      pH (UA) 6.5 5.0 - 8.0      Protein NEGATIVE  NEG mg/dL    Glucose NEGATIVE  NEG mg/dL    Ketone NEGATIVE  NEG mg/dL    Bilirubin NEGATIVE  NEG      Blood NEGATIVE  NEG      Urobilinogen 0.2 0.2 - 1.0 EU/dL    Nitrites NEGATIVE  NEG      Leukocyte Esterase TRACE (A) NEG      WBC 5-10 0 - 4 /hpf    RBC 0-5 0 - 5 /hpf    Epithelial cells MODERATE (A) FEW /lpf    Bacteria NEGATIVE  NEG /hpf    UA:UC IF INDICATED URINE CULTURE ORDERED (A) CNI      Hyaline cast 0-2 0 - 5 /lpf   STREP AG SCREEN, GROUP A    Collection Time: 01/01/18  9:05 AM   Result Value Ref Range    Group A Strep Ag ID NEGATIVE  NEG       Medical Decision Making   I am the first provider for this patient. I reviewed the vital signs, available nursing notes, past medical history, past surgical history, family history and social history. Vital Signs-Reviewed the patient's vital signs.   Patient Vitals for the past 12 hrs:   Temp Pulse Resp BP SpO2   01/01/18 0859 98.4 °F (36.9 °C) 79 16 132/69 100 %     Records Reviewed: Nursing Notes and Old Medical Records    Provider Notes (Medical Decision Making):   DDx: strep, viral syndrome, UTI, gastroenteritis, dehydration    ED Course:   Initial assessment performed. The patients presenting problems have been discussed, and they are in agreement with the care plan formulated and outlined with them. I have encouraged them to ask questions as they arise throughout their visit. 9:19 AM  Fetal heart tones complete. Good fetal movement. Heart rate 142.     10:03 AM  Patient tolerated PO well. Disposition:  DISCHARGE NOTE  10:07 AM  The patient has been re-evaluated and is ready for discharge. Reviewed available results with patient. Counseled patient on diagnosis and care plan. Patient has expressed understanding, and all questions have been answered. Patient agrees with plan and agrees to follow up as recommended, or return to the ED if their symptoms worsen. Discharge instructions have been provided and explained to the patient, along with reasons to return to the ED. PLAN:  1. Current Discharge Medication List      CONTINUE these medications which have NOT CHANGED    Details   metroNIDAZOLE (FLAGYL) 250 mg tablet Take 500 mg by mouth three (3) times daily. For bacterial vaginosis, since 12/28/17      PNV No12-Iron-FA-DSS-OM-3 29 mg iron-1 mg -50 mg CPKD Take  by mouth. 2.   Follow-up Information     Follow up With Details Comments [facility], MD In 1 week  [address]      Slim Hubbard MD In 1 week  [address] [facility]  [phone]          Return to ED if worse     Diagnosis     Clinical Impression:   1. Viral syndrome    2. Sore throat    3. Diarrhea, unspecified type    4. 31 weeks gestation of pregnancy        Attestations:    Attestation Note:  This note is prepared by ANGELIA Frank, acting as Scribe for [employer] : The rae's documentation has been prepared under my direction and personally reviewed by me in its entirety. I confirm that the note above accurately reflects all work, treatment, procedures, and medical decision making performed by me.    3:36 PM  I was personally available for consultation in the emergency department. I have reviewed the chart and agree with the documentation recorded by the [facility], including the assessment, treatment plan, and disposition.   Nora Oh MD

## 2018-01-01 NOTE — ED NOTES
Pt who is 31 weeks pregnant woke up around 0300 with sore throat and then again around 0600 with lower abdominal cramping and diarrhea x 4 episodes. No vomiting or fever. No vaginal bleeding or discharge. Fetal movement is detected by pt.

## 2018-01-01 NOTE — DISCHARGE INSTRUCTIONS
Strep Throat: Care Instructions  Your Care Instructions    Strep throat is a bacterial infection that causes sudden, severe sore throat and fever. Strep throat, which is caused by bacteria called streptococcus, is treated with antibiotics. Sometimes a strep test is necessary to tell if the sore throat is caused by strep bacteria. Treatment can help ease symptoms and may prevent future problems. Follow-up care is a key part of your treatment and safety. Be sure to make and go to all appointments, and call your doctor if you are having problems. It's also a good idea to know your test results and keep a list of the medicines you take. How can you care for yourself at home? · Take your antibiotics as directed. Do not stop taking them just because you feel better. You need to take the full course of antibiotics. · Strep throat can spread to others until 24 hours after you begin taking antibiotics. During this time, you should avoid contact with other people at work or home, especially infants and children. Do not sneeze or cough on others, and wash your hands often. Keep your drinking glass and eating utensils separate from those of others, and wash these items well in hot, soapy water. · Gargle with warm salt water at least once each hour to help reduce swelling and make your throat feel better. Use 1 teaspoon of salt mixed in 8 fluid ounces of warm water. · Take an over-the-counter pain medication, such as acetaminophen (Tylenol), ibuprofen (Advil, Motrin), or naproxen (Aleve). Read and follow all instructions on the label. · Try an over-the-counter anesthetic throat spray or throat lozenges, which may help relieve throat pain. · Drink plenty of fluids. Fluids may help soothe an irritated throat. Hot fluids, such as tea or soup, may help your throat feel better. · Eat soft solids and drink plenty of clear liquids.  Flavored ice pops, ice cream, scrambled eggs, sherbet, and gelatin dessert (such as Jell-O) may also soothe the throat. · Get lots of rest.  · Do not smoke, and avoid secondhand smoke. If you need help quitting, talk to your doctor about stop-smoking programs and medicines. These can increase your chances of quitting for good. · Use a vaporizer or humidifier to add moisture to the air in your bedroom. Follow the directions for cleaning the machine. When should you call for help? Call your doctor now or seek immediate medical care if:  ? · You have a new or higher fever. ? · You have a fever with a stiff neck or severe headache. ? · You have new or worse trouble swallowing. ? · Your sore throat gets much worse on one side. ? · Your pain becomes much worse on one side of your throat. ? Watch closely for changes in your health, and be sure to contact your doctor if:  ? · You are not getting better after 2 days (48 hours). ? · You do not get better as expected. Where can you learn more? Go to http://robbie-zehra.info/. Enter K625 in the search box to learn more about \"Strep Throat: Care Instructions. \"  Current as of: May 12, 2017  Content Version: 11.4  © 1094-8210 [employer]. Care instructions adapted under license by [employer] (which disclaims liability or warranty for this information). If you have questions about a medical condition or this instruction, always ask your healthcare professional. [place] [address] any warranty or liability for your use of this information. Diarrhea: Care Instructions  Your Care Instructions    Diarrhea is loose, watery stools (bowel movements). The exact cause is often hard to find. Sometimes diarrhea is your body's way of getting rid of what caused an upset stomach. Viruses, food poisoning, and many medicines can cause diarrhea. Some people get diarrhea in response to emotional stress, anxiety, or certain foods. Almost everyone has diarrhea now and then.  It usually isn't serious, and your stools will return to normal soon. The important thing to do is replace the fluids you have lost, so you can prevent dehydration. The doctor has checked you carefully, but problems can develop later. If you notice any problems or new symptoms, get medical treatment right away. Follow-up care is a key part of your treatment and safety. Be sure to make and go to all appointments, and call your doctor if you are having problems. It's also a good idea to know your test results and keep a list of the medicines you take. How can you care for yourself at home? · Watch for signs of dehydration, which means your body has lost too much water. Dehydration is a serious condition and should be treated right away. Signs of dehydration are:  ¨ Increasing thirst and dry eyes and mouth. ¨ Feeling faint or lightheaded. ¨ Darker urine, and a smaller amount of urine than normal.  · To prevent dehydration, drink plenty of fluids, enough so that your urine is light yellow or clear like water. Choose water and other caffeine-free clear liquids until you feel better. If you have kidney, heart, or liver disease and have to limit fluids, talk with your doctor before you increase the amount of fluids you drink. · Begin eating small amounts of mild foods the next day, if you feel like it. ¨ Try yogurt that has live cultures of Lactobacillus. (Check the label.)  ¨ Avoid spicy foods, fruits, alcohol, and caffeine until 48 hours after all symptoms are gone. ¨ Avoid chewing gum that contains sorbitol. ¨ Avoid dairy products (except for yogurt with Lactobacillus) while you have diarrhea and for 3 days after symptoms are gone. · The doctor may recommend that you take over-the-counter medicine, such as loperamide (Imodium), if you still have diarrhea after 6 hours. Read and follow all instructions on the label. Do not use this medicine if you have bloody diarrhea, a high fever, or other signs of serious illness.  Call your doctor if you think you are having a problem with your medicine. When should you call for help? Call 911 anytime you think you may need emergency care. For example, call if:  ? · You passed out (lost consciousness). ? · Your stools are maroon or very bloody. ?Call your doctor now or seek immediate medical care if:  ? · You are dizzy or lightheaded, or you feel like you may faint. ? · Your stools are black and look like tar, or they have streaks of blood. ? · You have new or worse belly pain. ? · You have symptoms of dehydration, such as:  ¨ Dry eyes and a dry mouth. ¨ Passing only a little dark urine. ¨ Feeling thirstier than usual.   ? · You have a new or higher fever. ? Watch closely for changes in your health, and be sure to contact your doctor if:  ? · Your diarrhea is getting worse. ? · You see pus in the diarrhea. ? · You are not getting better after 2 days (48 hours). Where can you learn more? Go to http://robbie-zehra.info/. Enter X713 in the search box to learn more about \"Diarrhea: Care Instructions. \"  Current as of: March 20, 2017  Content Version: 11.4  © 6906-8292 [employer]. Care instructions adapted under license by [employer] (which disclaims liability or warranty for this information). If you have questions about a medical condition or this instruction, always ask your healthcare professional. [place] [address] any warranty or liability for your use of this information.

## 2018-01-01 NOTE — LETTER
[address] 
[facility] EMERGENCY DEPT 
[address] [place] [address] [address] [id number] 
[phone] Work/School Note Date: 1/1/2018 To Whom It May concern: 
 
Braxton Telles was seen and treated today in the emergency room by the following provider(s): 
Attending Provider: Eva Rose MD 
Physician Assistant: JOVANNY Briggs. Please excuse Braxton Telles from work today. Sincerely, Nikita Jeffrey, [phone]

## 2018-01-02 LAB
BACTERIA SPEC CULT: NORMAL
CC UR VC: NORMAL
SERVICE CMNT-IMP: NORMAL

## 2018-01-03 LAB
BACTERIA SPEC CULT: NORMAL
SERVICE CMNT-IMP: NORMAL

## 2018-02-06 ENCOUNTER — HOSPITAL ENCOUNTER (INPATIENT)
Age: 23
LOS: 3 days | Discharge: HOME | DRG: 560 | End: 2018-02-09
Attending: OBSTETRICS & GYNECOLOGY | Admitting: OBSTETRICS & GYNECOLOGY
Payer: MEDICAID

## 2018-02-06 DIAGNOSIS — Z34.90 PREGNANCY, UNSPECIFIED GESTATIONAL AGE: Primary | ICD-10-CM

## 2018-02-06 LAB
ALBUMIN SERPL-MCNC: 3.3 G/DL (ref 3.5–5)
ALBUMIN/GLOB SERPL: 0.8 {RATIO} (ref 1.1–2.2)
ALP SERPL-CCNC: 256 U/L (ref 45–117)
ALT SERPL-CCNC: 21 U/L (ref 12–78)
ANION GAP SERPL CALC-SCNC: 8 MMOL/L (ref 5–15)
AST SERPL-CCNC: 24 U/L (ref 15–37)
BASOPHILS # BLD: 0 K/UL (ref 0–0.1)
BASOPHILS NFR BLD: 0 % (ref 0–1)
BILIRUB SERPL-MCNC: 0.5 MG/DL (ref 0.2–1)
BUN SERPL-MCNC: 8 MG/DL (ref 6–20)
BUN/CREAT SERPL: 11 (ref 12–20)
CALCIUM SERPL-MCNC: 9.4 MG/DL (ref 8.5–10.1)
CHLORIDE SERPL-SCNC: 107 MMOL/L (ref 97–108)
CO2 SERPL-SCNC: 23 MMOL/L (ref 21–32)
CREAT SERPL-MCNC: 0.71 MG/DL (ref 0.55–1.02)
DIFFERENTIAL METHOD BLD: ABNORMAL
EOSINOPHIL # BLD: 0.2 K/UL (ref 0–0.4)
EOSINOPHIL NFR BLD: 1 % (ref 0–7)
ERYTHROCYTE [DISTWIDTH] IN BLOOD BY AUTOMATED COUNT: 12.5 % (ref 11.5–14.5)
GLOBULIN SER CALC-MCNC: 4.3 G/DL (ref 2–4)
GLUCOSE SERPL-MCNC: 65 MG/DL (ref 65–100)
HCT VFR BLD AUTO: 34.5 % (ref 35–47)
HGB BLD-MCNC: 11.4 G/DL (ref 11.5–16)
IMM GRANULOCYTES # BLD: 0.1 K/UL (ref 0–0.04)
IMM GRANULOCYTES NFR BLD AUTO: 1 % (ref 0–0.5)
LYMPHOCYTES # BLD: 2.4 K/UL (ref 0.8–3.5)
LYMPHOCYTES NFR BLD: 22 % (ref 12–49)
MCH RBC QN AUTO: 31.4 PG (ref 26–34)
MCHC RBC AUTO-ENTMCNC: 33 G/DL (ref 30–36.5)
MCV RBC AUTO: 95 FL (ref 80–99)
MONOCYTES # BLD: 0.9 K/UL (ref 0–1)
MONOCYTES NFR BLD: 8 % (ref 5–13)
NEUTS SEG # BLD: 7.3 K/UL (ref 1.8–8)
NEUTS SEG NFR BLD: 67 % (ref 32–75)
NRBC # BLD: 0 K/UL (ref 0–0.01)
NRBC BLD-RTO: 0 PER 100 WBC
PLATELET # BLD AUTO: 193 K/UL (ref 150–400)
PMV BLD AUTO: 11.7 FL (ref 8.9–12.9)
POTASSIUM SERPL-SCNC: 3.5 MMOL/L (ref 3.5–5.1)
PROT SERPL-MCNC: 7.6 G/DL (ref 6.4–8.2)
RBC # BLD AUTO: 3.63 M/UL (ref 3.8–5.2)
SODIUM SERPL-SCNC: 138 MMOL/L (ref 136–145)
WBC # BLD AUTO: 11 K/UL (ref 3.6–11)

## 2018-02-06 PROCEDURE — 3E033VJ INTRODUCTION OF OTHER HORMONE INTO PERIPHERAL VEIN, PERCUTANEOUS APPROACH: ICD-10-PCS | Performed by: SPECIALIST

## 2018-02-06 PROCEDURE — 80053 COMPREHEN METABOLIC PANEL: CPT | Performed by: OBSTETRICS & GYNECOLOGY

## 2018-02-06 PROCEDURE — 75410000002 HC LABOR FEE PER 1 HR

## 2018-02-06 PROCEDURE — 74011250637 HC RX REV CODE- 250/637: Performed by: OBSTETRICS & GYNECOLOGY

## 2018-02-06 PROCEDURE — 36415 COLL VENOUS BLD VENIPUNCTURE: CPT | Performed by: OBSTETRICS & GYNECOLOGY

## 2018-02-06 PROCEDURE — 77030034849

## 2018-02-06 PROCEDURE — 77030007880 HC KT SPN EPDRL BBMI -B

## 2018-02-06 PROCEDURE — 85025 COMPLETE CBC W/AUTO DIFF WBC: CPT | Performed by: OBSTETRICS & GYNECOLOGY

## 2018-02-06 PROCEDURE — 59200 INSERT CERVICAL DILATOR: CPT

## 2018-02-06 PROCEDURE — 65410000002 HC RM PRIVATE OB

## 2018-02-06 RX ORDER — ONDANSETRON 2 MG/ML
4 INJECTION INTRAMUSCULAR; INTRAVENOUS
Status: DISCONTINUED | OUTPATIENT
Start: 2018-02-06 | End: 2018-02-07

## 2018-02-06 RX ORDER — SODIUM CHLORIDE 0.9 % (FLUSH) 0.9 %
5-10 SYRINGE (ML) INJECTION AS NEEDED
Status: DISCONTINUED | OUTPATIENT
Start: 2018-02-06 | End: 2018-02-07

## 2018-02-06 RX ORDER — NALBUPHINE HYDROCHLORIDE 10 MG/ML
10 INJECTION, SOLUTION INTRAMUSCULAR; INTRAVENOUS; SUBCUTANEOUS
Status: DISCONTINUED | OUTPATIENT
Start: 2018-02-06 | End: 2018-02-07

## 2018-02-06 RX ORDER — ACETAMINOPHEN 325 MG/1
650 TABLET ORAL
Status: DISCONTINUED | OUTPATIENT
Start: 2018-02-06 | End: 2018-02-07

## 2018-02-06 RX ORDER — SODIUM CHLORIDE 0.9 % (FLUSH) 0.9 %
5-10 SYRINGE (ML) INJECTION EVERY 8 HOURS
Status: DISCONTINUED | OUTPATIENT
Start: 2018-02-06 | End: 2018-02-07

## 2018-02-06 RX ORDER — SODIUM CHLORIDE 0.9 % (FLUSH) 0.9 %
5-10 SYRINGE (ML) INJECTION AS NEEDED
Status: DISCONTINUED | OUTPATIENT
Start: 2018-02-06 | End: 2018-02-09 | Stop reason: HOSPADM

## 2018-02-06 RX ORDER — ZOLPIDEM TARTRATE 5 MG/1
5 TABLET ORAL
Status: DISCONTINUED | OUTPATIENT
Start: 2018-02-06 | End: 2018-02-09 | Stop reason: HOSPADM

## 2018-02-06 RX ORDER — CLINDAMYCIN PHOSPHATE 900 MG/50ML
900 INJECTION INTRAVENOUS EVERY 8 HOURS
Status: DISCONTINUED | OUTPATIENT
Start: 2018-02-06 | End: 2018-02-07

## 2018-02-06 RX ORDER — OXYTOCIN IN 5 % DEXTROSE 30/500 ML
1-25 PLASTIC BAG, INJECTION (ML) INTRAVENOUS
Status: DISCONTINUED | OUTPATIENT
Start: 2018-02-06 | End: 2018-02-07

## 2018-02-06 RX ORDER — SODIUM CHLORIDE, SODIUM LACTATE, POTASSIUM CHLORIDE, CALCIUM CHLORIDE 600; 310; 30; 20 MG/100ML; MG/100ML; MG/100ML; MG/100ML
125 INJECTION, SOLUTION INTRAVENOUS CONTINUOUS
Status: DISCONTINUED | OUTPATIENT
Start: 2018-02-06 | End: 2018-02-07

## 2018-02-06 RX ORDER — SODIUM CHLORIDE 0.9 % (FLUSH) 0.9 %
5-10 SYRINGE (ML) INJECTION EVERY 8 HOURS
Status: DISCONTINUED | OUTPATIENT
Start: 2018-02-06 | End: 2018-02-09 | Stop reason: HOSPADM

## 2018-02-06 RX ORDER — NALOXONE HYDROCHLORIDE 0.4 MG/ML
0.4 INJECTION, SOLUTION INTRAMUSCULAR; INTRAVENOUS; SUBCUTANEOUS AS NEEDED
Status: DISCONTINUED | OUTPATIENT
Start: 2018-02-06 | End: 2018-02-07

## 2018-02-06 RX ADMIN — DINOPROSTONE 10 MG: 10 INSERT VAGINAL at 17:10

## 2018-02-06 NOTE — IP AVS SNAPSHOT
[address] [facility]. 
[phone] Patient: Shasha Abrams 
MRN: [id number] :1995 About your hospitalization You were admitted on:  2018 You last received care in the:  [facility] You were discharged on:  2018 Why you were hospitalized Your primary diagnosis was:  Not on File Your diagnoses also included:  Pregnant Follow-up Information Follow up With Details Comments Contact Info Karl Garcia MD   [address] [place], [place] [id number] [phone] Discharge Orders None A check leticia indicates which time of day the medication should be taken. My Medications START taking these medications Instructions Each Dose to Equal  
 Morning Noon Evening Bedtime  
 ibuprofen 800 mg tablet Commonly known as:  MOTRIN Your last dose was: Your next dose is: Take 1 Tab by mouth every eight (8) hours as needed for Pain. 800 mg  
    
   
   
   
  
 oxyCODONE-acetaminophen 5-325 mg per tablet Commonly known as:  PERCOCET Your last dose was: Your next dose is: Take 1 Tab by mouth every four (4) hours as needed. Max Daily Amount: 6 Tabs. 1 Tab ASK your doctor about these medications Instructions Each Dose to Equal  
 Morning Noon Evening Bedtime FLAGYL 250 mg tablet Generic drug:  metroNIDAZOLE Your last dose was: Your next dose is: Take 500 mg by mouth three (3) times daily. For bacterial vaginosis, since 17  
 500 mg  
    
   
   
   
  
 PNV No12-Iron-FA-DSS-OM-3 29 mg iron-1 mg -50 mg Cpkd Your last dose was: Your next dose is: Take  by mouth. Where to Get Your Medications Information on where to get these meds will be given to you by the nurse or doctor. ! Ask your nurse or doctor about these medications  
  ibuprofen 800 mg tablet  
 oxyCODONE-acetaminophen 5-325 mg per tablet Discharge Instructions Vaginal Childbirth (Postpartum Care): After Your Visit Your Care Instructions After childbirth (postpartum period), your body goes through many changes. Some of these changes happen over several weeks. It is easy to get too tired and overwhelmed during the first weeks after childbirth. Take it easy on yourself. You may find it hard to meet the extra demands on your energy and time. Get rest whenever you can, accept help from others, and eat well and drink plenty of fluids. Your body will slowly heal in the next few weeks. You may feel emotional during this time. Changes in your hormones can shift your mood without warning. No heavy lifting. No more than 8 lbs or the size of baby for 2-3 weeks. Avoid stairs. Limit to 1-2 times per day for the 1st week after delivery. No driving for the first week after delivery. Follow-up care is a key part of your treatment and safety. Be sure to make and go to all appointments, and call your doctor if you are having problems. Its also a good idea to know your test results and keep a list of the medicines you take. Around 4 to 6 weeks after your baby's birth, you will have a follow-up visit with your doctor. This visit is your time to talk to your doctor about anything you are concerned or curious about. Keep a list of questions to bring to your postpartum visit. Your questions might be about: 
Changes in your breasts, such as lumps or soreness. When to expect your menstrual period to start again. What form of birth control is best for you. Weight you have put on during the pregnancy. Exercise options. What foods and drinks are best for you, especially if you are breast-feeding. Problems you might be having with breast-feeding. When you can have sex. Some women may want to talk about lubricants for the vagina. Any feelings of sadness or restlessness that you are having. How can you care for yourself at home? Vaginal bleeding and cramps After delivery, you will have a bloody discharge from the vagina. This will turn pink within a week and then white or yellow after about 10 days. It may last for 2 to 4 weeks or longer, until the uterus has healed. Do not rinse inside your vagina with fluids (douche). Do not worry if you pass some blood clots, as long as they are smaller than a golf ball. If you have a tear or stitches in your vaginal area, change the pad at least every 4 hours to prevent soreness and infection. You may have cramps for the first few days after childbirth. These are normal and occur as the uterus shrinks to normal size. Take an over-the-counter pain medicine, such as acetaminophen (Tylenol), ibuprofen (Advil, Motrin), or naproxen (Aleve), for cramps. Read and follow all instructions on the label. Do not take aspirin, because it can cause more bleeding. Do not take two or more pain medicines at the same time unless the doctor told you to. Many pain medicines have acetaminophen, which is Tylenol. Too much acetaminophen (Tylenol) can be harmful. Stitches If you have stitches, they will dissolve on their own and do not need to be removed. Follow your doctor's instructions for cleaning the stitched area. Put ice or a cold pack on your painful area for 10 to 20 minutes at a time. , several times a day, for the first few days. Put a thin cloth between the ice and your skin. Sit in a few inches of warm water (sitz bath) 3 times a day and after bowel movements. The warm water helps with pain and itching. If you do not have a tub, a warm shower might help. Keep the area clean by pouring or spraying warm water over the area outside your vagina and anus after you use the toilet. Breast fullness Your breasts may overfill (engorge) in the first few days after delivery. To help milk flow and to relieve pain, warm your breasts in the shower or by using warm, moist towels before nursing. If you are not nursing, do not put warmth on your breasts or touch your breasts. Wear a tight bra or sports bra and use ice until the fullness goes away. This usually takes 2 to 3 days. Put ice or a cold pack on your breast after nursing to reduce swelling and pain. Put a thin cloth between the ice and your skin. Activity Eat a balanced diet. Do not try to lose weight by cutting calories. Keep taking your prenatal vitamins, or take a multivitamin. ·  Get help with household chores from family or friends, if you can. Do not try to do it all yourself. Get as much rest as you can. Try to take naps when your baby sleeps during the day. Get some exercise every day. But do not do any heavy exercise until your doctor says it is okay. Do not have sex or use tampons until you have stopped bleeding and at least 4 to 6 weeks have passed since you gave birth. Talk to your doctor about birth control. You can get pregnant even before your period returns. Also, you can get pregnant while you are breast-feeding. Mental health It is normal to have some sadness, anxiety, sleeplessness, and mood swings after you go home. If you feel upset or hopeless for more than a few days or are having trouble doing the things you need to do, talk to your doctor. · Many women get the \"baby blues\" during the first few days after childbirth. The \"baby blues\" usually peak around the fourth day and then ease up in less than 2 weeks. If you have the \"baby blues\" for more than a few days, or if you have thoughts of hurting yourself or your baby, call your doctor right away. Constipation and hemorrhoids Drink plenty of fluids, enough so that your urine is light yellow or clear like water.  If you have kidney, heart, or liver disease and have to limit fluids, talk with your doctor before you increase the amount of fluids you drink. Eat plenty of fiber each day to avoid constipation. Include foods such as whole-grain breads and cereals, raw vegetables, raw and dried fruits, and beans. · If you have hemorrhoids or swelling or pain around the opening of your vagina, try using cold and heat. You can put ice or a cold pack on the area for 10 to 20 minutes at a time. Put a thin cloth between the ice and your skin. Also try sitting in a few inches of warm water (sitz bath) 3 times a day and after bowel movements. When should you call for help? Call 911 anytime you think you may need emergency care. For example, call if: 
You are thinking of hurting yourself, your baby, or anyone else. You have sudden, severe pain in your belly. You passed out (lost consciousness). Call your doctor now or seek immediate medical care if: 
You have severe vaginal bleeding. You are passing blood clots and soaking through a pad each hour for 2 or more hours. Your vaginal bleeding seems to be getting heavier or is still bright red 4 days after delivery, or you pass blood clots larger than the size of a golf ball. You are dizzy or lightheaded, or you feel like you may faint. You are vomiting or cannot keep fluids down. You have a fever. You have new or more belly pain. You pass tissue (not just blood). Your vaginal discharge smells bad. Your belly feels tender or full and hard. Your breasts are continuously painful or red. You feel sad, anxious, or hopeless for more than a few days. Watch closely for changes in your health, and be sure to contact your doctor if you have any problems. Where can you learn more? Go to The Smart Baker.be Enter X338  in the search box to learn more about \"Postpartum Care: After Your Visit\". or 
 
Go to [employer] Smart [employer]be Enter T403  in the search box to learn more about \"Vaginal Childbirth: After Your Visit\". © 9826-5341 Healthwise, Incorporated. Care instructions adapted under license by Victoria Canales (which disclaims liability or warranty for this information). This care instruction is for use with your licensed healthcare professional. If you have questions about a medical condition or this instruction, always ask your healthcare professional. Tani Brooks any warranty or liability for your use of this information. Openbucks[employer] Activation Thank you for requesting access to [employer]. Please follow the instructions below to securely access and download your online medical record. [employer] allows you to send messages to your doctor, view your test results, renew your prescriptions, schedule appointments, and more. How Do I Sign Up? 1. In your internet browser, go to [email or web address] 2. Click on the First Time User? Click Here link in the Sign In box. You will see the New Member Sign Up page. 3. Enter your [employer] Access Code exactly as it appears below. You will not need to use this code after youve completed the sign-up process. If you do not sign up before the expiration date, you must request a new code. [employer] Access Code: Activation code not generated Current [employer] Status: Active (This is the date your [employer] access code will ) 4. Enter the last four digits of your Social Security Number (xxxx) and Date of Birth (mm/dd/yyyy) as indicated and click Submit. You will be taken to the next sign-up page. 5. Create a [employer] ID. This will be your [employer] login ID and cannot be changed, so think of one that is secure and easy to remember. 6. Create a [employer] password. You can change your password at any time. 7. Enter your Password Reset Question and Answer. This can be used at a later time if you forget your password. 8. Enter your e-mail address. You will receive e-mail notification when new information is available in [address] 9. Click Sign Up. You can now view and download portions of your medical record. 10. Click the Download Summary menu link to download a portable copy of your medical information. Additional Information If you have questions, please visit the Frequently Asked Questions section of the [employer] website at [email or web address] Remember, MyChart is NOT to be used for urgent needs. For medical emergencies, dial 911. Introducing [facility]! Dear Obdulia Pham: Thank you for requesting a [employer] account. Our records indicate that you already have an active [employer] account. You can access your account anytime at [email or web address] Did you know that you can access your hospital and ER discharge instructions at any time in [employer]? You can also review all of your test results from your hospital stay or ER visit. Additional Information If you have questions, please visit the Frequently Asked Questions section of the [employer] website at [email or web address] Remember, On [employer] is NOT to be used for urgent needs. For medical emergencies, dial 911. Now available from your iPhone and Android! Providers Seen During Your Hospitalization Provider Specialty Primary office phone Miracle Nieves MD Obstetrics & Gynecology [phone] Immunizations Administered for This Admission Name Date MMR 2/9/2018 Tdap 2/8/2018 Your Primary Care Physician (PCP) Primary Care Physician Office Phone Office Fax Harshil Farooq [phone] You are allergic to the following Allergen Reactions Penicillins Hives Recent Documentation Height Weight Breastfeeding? BMI OB Status Smoking Status 1.6 m 64.9 kg Unknown 25.33 kg/m2 Recent pregnancy Never Smoker Emergency Contacts Name Discharge Info Relation Home Work Mobile Car[place] CAREGIVER [3] Mother [14] [phone] Patient Belongings The following personal items are in your possession at time of discharge: 
  Dental Appliances: None  Visual Aid: Glasses, With patient      Home Medications: None   Jewelry: None  Clothing: Pants, Footwear, Shirt    Other Valuables: Cell Phone, Aguilar Matheson Please provide this summary of care documentation to your next provider. Signatures-by signing, you are acknowledging that this After Visit Summary has been reviewed with you and you have received a copy. Patient Signature:  ____________________________________________________________ Date:  ____________________________________________________________  
  
[facility] Provider Signature:  ____________________________________________________________ Date:  ____________________________________________________________

## 2018-02-06 NOTE — H&P
History & Physical    Name: Bertha Avery MRN: [id number]  SSN: [id number]    YOB: 1995  Age: 26 y.o. Sex: female      Subjective:     Estimated Date of Delivery: 3/1/18  OB History    Para Term  AB Living   1        SAB TAB Ectopic Molar Multiple Live Births              # Outcome Date GA Lbr Marco/2nd Weight Sex Delivery Anes PTL Lv   1 Current                   Ms. Delores Brown is admitted with pregnancy at 36w5d for induction of labor due to low amniotic fluid. Prenatal course was normal.  Please see prenatal records for details. Past Medical History:   Diagnosis Date    Chlamydia     Endocrine disease     hypoglycemia    Gastrointestinal disorder     GERD    Hypokalemia     Ill-defined condition     kidney failure    Panic attacks     Psychiatric disorder     anxiety, panic disorder    Trichomonal infection      No past surgical history on file. Social History     Occupational History    Not on file. Social History Main Topics    Smoking status: Never Smoker    Smokeless tobacco: Never Used    Alcohol use No    Drug use: No    Sexual activity: Yes     Partners: Male     Birth control/ protection: Condom     Family History   Problem Relation Age of Onset    Migraines Mother        Allergies   Allergen Reactions    Penicillins Hives     Prior to Admission medications    Medication Sig Start Date End Date Taking? Authorizing Provider   metroNIDAZOLE (FLAGYL) 250 mg tablet Take 500 mg by mouth three (3) times daily. For bacterial vaginosis, since 18    Phys Other, MD   PNV No12-Iron-FA-DSS-OM-3 29 mg iron-1 mg -50 mg CPKD Take  by mouth. Historical Provider        Review of Systems: A comprehensive review of systems was negative except for that written in the History of Present Illness. Objective:     Vitals: There were no vitals filed for this visit.      Physical Exam:  Cervical Exam: 2 cm dilated    Membranes:  Intact  Fetal Heart Rate: Reactive Prenatal Labs:   No results found for: ABORH, RUBELLAEXT, HBSAGEXT, HIVEXT, RPREXT, GONNOEXT, CHLAMEXT, ABORHEXT, RUBELLAEXT, GRBSEXT, HBSAGEXT, HIVEXT, RPREXT, GONNOEXT, CHLAMEXT    Impression/Plan:     Active Problems:    * No active hospital problems.  *   36 5/7 IUP  Oligo ROSALIO 2.5 decreased from 5 last week despite aggressive hydration    Plan: Admit for induction of labor  Penicillin for GBS unknown    Signed By:  Rhea Cox MD     February 6, 2018

## 2018-02-06 NOTE — PROGRESS NOTES
1515 Prior notification of patient's arrival for medical induction due to oligohydramnios from [address] [facility] 38. telephone orders obtained. Receive pt ambulatory and admit to room  room 3168 . Patient instructed to remove all clothing and to change into a hospital gown  Routine L&D triage / Birthplace procedures explained including how to contact nurse,  and to call for nursing assistance prior to getting OOB to use bathroom. The call bell is within reach. External fetal monitor and toco applied,FHT's:140 She states that she feels the baby moving and she having uterine contractions every 2-5 minutes and  are palpating mild. She is a G2 P A 1L0, EDC: 03/01/2018      Prenatal medications and allergies reviewed  Consents reviewed and witnessed, as the patient acknowledged w/signature. 1704 20 g IV started in right wrist by Abdi Latham RN    7086 Dr Meliton Argueta at the bedside to assess the patient and to discuss the POC. ,Cervidil inserted by Dr Meliton Argueta, patient tolerated well. Pt's mother here to bring her dinner, Explained to her that she cannot eat or drink for 1 hour. She denies:bleeding, ruptured membranes, vaginal discharge,abdominal pain,pelvic pressure, elevated blood pressure, headache,dizziness, visual changes,epigastric pain, edema, DM, nausea,vomiting, diarrhea, shortness of breath,chest pain, cough,urinary urgency/frequency, dysuria,  fever/chills, or flank pain. She reports a history of renal failure, panic attacks,and  GERD. Numeric pain scale explained to patient, pain scale rating; 0 /10    1813  Pt sitting up to eat her dinner, difficult to get a continous fetal tracing on the baby. 1840 Pt finished with her dinner, HOB lowered. 1906 Verbal  report given to  CHENTE Guy RN,on [place] in room 5713. Report consisted of patients Situation, Background, Assessment and Recommendations(SBAR).    Information from the following report(s) SBAR, Kardex, Intake/Output, MAR and Recent Results were reviewed with the receiving nurse. Opportunity for questions and clarification was provided,care assumed.

## 2018-02-07 ENCOUNTER — ANESTHESIA (OUTPATIENT)
Dept: LABOR AND DELIVERY | Age: 23
DRG: 560 | End: 2018-02-07
Payer: MEDICAID

## 2018-02-07 ENCOUNTER — ANESTHESIA EVENT (OUTPATIENT)
Dept: LABOR AND DELIVERY | Age: 23
DRG: 560 | End: 2018-02-07
Payer: MEDICAID

## 2018-02-07 PROCEDURE — 75410000003 HC RECOV DEL/VAG/CSECN EA 0.5 HR

## 2018-02-07 PROCEDURE — 75410000002 HC LABOR FEE PER 1 HR

## 2018-02-07 PROCEDURE — 00HU33Z INSERTION OF INFUSION DEVICE INTO SPINAL CANAL, PERCUTANEOUS APPROACH: ICD-10-PCS | Performed by: ANESTHESIOLOGY

## 2018-02-07 PROCEDURE — 74011000250 HC RX REV CODE- 250

## 2018-02-07 PROCEDURE — 3E0R3BZ INTRODUCTION OF ANESTHETIC AGENT INTO SPINAL CANAL, PERCUTANEOUS APPROACH: ICD-10-PCS | Performed by: ANESTHESIOLOGY

## 2018-02-07 PROCEDURE — 75410000000 HC DELIVERY VAGINAL/SINGLE

## 2018-02-07 PROCEDURE — 76060000078 HC EPIDURAL ANESTHESIA

## 2018-02-07 PROCEDURE — 65410000002 HC RM PRIVATE OB

## 2018-02-07 PROCEDURE — 77030014125 HC TY EPDRL BBMI -B: Performed by: ANESTHESIOLOGY

## 2018-02-07 PROCEDURE — 74011250636 HC RX REV CODE- 250/636: Performed by: OBSTETRICS & GYNECOLOGY

## 2018-02-07 PROCEDURE — 10907ZC DRAINAGE OF AMNIOTIC FLUID, THERAPEUTIC FROM PRODUCTS OF CONCEPTION, VIA NATURAL OR ARTIFICIAL OPENING: ICD-10-PCS | Performed by: SPECIALIST

## 2018-02-07 PROCEDURE — 74011250636 HC RX REV CODE- 250/636

## 2018-02-07 PROCEDURE — 74011250637 HC RX REV CODE- 250/637: Performed by: SPECIALIST

## 2018-02-07 PROCEDURE — 0HQ9XZZ REPAIR PERINEUM SKIN, EXTERNAL APPROACH: ICD-10-PCS | Performed by: SPECIALIST

## 2018-02-07 RX ORDER — OXYCODONE AND ACETAMINOPHEN 5; 325 MG/1; MG/1
1 TABLET ORAL
Status: DISCONTINUED | OUTPATIENT
Start: 2018-02-07 | End: 2018-02-09 | Stop reason: HOSPADM

## 2018-02-07 RX ORDER — IBUPROFEN 400 MG/1
800 TABLET ORAL EVERY 8 HOURS
Status: DISCONTINUED | OUTPATIENT
Start: 2018-02-07 | End: 2018-02-09 | Stop reason: HOSPADM

## 2018-02-07 RX ORDER — FENTANYL/BUPIVACAINE/NS/PF 2-1250MCG
PREFILLED PUMP RESERVOIR EPIDURAL
Status: COMPLETED
Start: 2018-02-07 | End: 2018-02-07

## 2018-02-07 RX ORDER — FENTANYL/BUPIVACAINE/NS/PF 2-1250MCG
1-18 PREFILLED PUMP RESERVOIR EPIDURAL CONTINUOUS
Status: DISCONTINUED | OUTPATIENT
Start: 2018-02-07 | End: 2018-02-07

## 2018-02-07 RX ORDER — HYDROCORTISONE ACETATE PRAMOXINE HCL 2.5; 1 G/100G; G/100G
CREAM TOPICAL AS NEEDED
Status: DISCONTINUED | OUTPATIENT
Start: 2018-02-07 | End: 2018-02-09 | Stop reason: HOSPADM

## 2018-02-07 RX ORDER — EPHEDRINE SULFATE 50 MG/ML
10 INJECTION, SOLUTION INTRAVENOUS AS NEEDED
Status: DISCONTINUED | OUTPATIENT
Start: 2018-02-07 | End: 2018-02-07

## 2018-02-07 RX ORDER — FENTANYL CITRATE 50 UG/ML
INJECTION, SOLUTION INTRAMUSCULAR; INTRAVENOUS
Status: DISCONTINUED
Start: 2018-02-07 | End: 2018-02-07

## 2018-02-07 RX ORDER — BUPIVACAINE HYDROCHLORIDE 2.5 MG/ML
INJECTION, SOLUTION EPIDURAL; INFILTRATION; INTRACAUDAL
Status: DISCONTINUED
Start: 2018-02-07 | End: 2018-02-07

## 2018-02-07 RX ORDER — OXYTOCIN/RINGER'S LACTATE 20/1000 ML
125-500 PLASTIC BAG, INJECTION (ML) INTRAVENOUS ONCE
Status: ACTIVE | OUTPATIENT
Start: 2018-02-07 | End: 2018-02-07

## 2018-02-07 RX ORDER — NALOXONE HYDROCHLORIDE 0.4 MG/ML
0.4 INJECTION, SOLUTION INTRAMUSCULAR; INTRAVENOUS; SUBCUTANEOUS AS NEEDED
Status: DISCONTINUED | OUTPATIENT
Start: 2018-02-07 | End: 2018-02-09 | Stop reason: HOSPADM

## 2018-02-07 RX ORDER — BUPIVACAINE HYDROCHLORIDE AND EPINEPHRINE 2.5; 5 MG/ML; UG/ML
INJECTION, SOLUTION EPIDURAL; INFILTRATION; INTRACAUDAL; PERINEURAL AS NEEDED
Status: DISCONTINUED | OUTPATIENT
Start: 2018-02-07 | End: 2018-02-07 | Stop reason: HOSPADM

## 2018-02-07 RX ORDER — OXYTOCIN IN 5 % DEXTROSE 30/500 ML
PLASTIC BAG, INJECTION (ML) INTRAVENOUS
Status: DISCONTINUED
Start: 2018-02-07 | End: 2018-02-07

## 2018-02-07 RX ADMIN — IBUPROFEN 800 MG: 400 TABLET, FILM COATED ORAL at 21:28

## 2018-02-07 RX ADMIN — SODIUM CHLORIDE, SODIUM LACTATE, POTASSIUM CHLORIDE, AND CALCIUM CHLORIDE 125 ML/HR: 600; 310; 30; 20 INJECTION, SOLUTION INTRAVENOUS at 02:00

## 2018-02-07 RX ADMIN — FENTANYL 0.2 MG/100ML-BUPIV 0.125%-NACL 0.9% EPIDURAL INJ 12 ML/HR: 2/0.125 SOLUTION at 08:09

## 2018-02-07 RX ADMIN — BUPIVACAINE HYDROCHLORIDE AND EPINEPHRINE 0.8 ML: 2.5; 5 INJECTION, SOLUTION EPIDURAL; INFILTRATION; INTRACAUDAL; PERINEURAL at 08:08

## 2018-02-07 RX ADMIN — CLINDAMYCIN PHOSPHATE 900 MG: 18 INJECTION, SOLUTION INTRAMUSCULAR; INTRAVENOUS at 02:00

## 2018-02-07 RX ADMIN — SODIUM CHLORIDE, SODIUM LACTATE, POTASSIUM CHLORIDE, AND CALCIUM CHLORIDE 999 ML/HR: 600; 310; 30; 20 INJECTION, SOLUTION INTRAVENOUS at 07:28

## 2018-02-07 RX ADMIN — SODIUM CHLORIDE, SODIUM LACTATE, POTASSIUM CHLORIDE, AND CALCIUM CHLORIDE 125 ML/HR: 600; 310; 30; 20 INJECTION, SOLUTION INTRAVENOUS at 07:56

## 2018-02-07 RX ADMIN — IBUPROFEN 800 MG: 400 TABLET, FILM COATED ORAL at 13:27

## 2018-02-07 RX ADMIN — Medication 1 MILLI-UNITS/MIN: at 06:14

## 2018-02-07 RX ADMIN — BUPIVACAINE HYDROCHLORIDE AND EPINEPHRINE 5 ML: 2.5; 5 INJECTION, SOLUTION EPIDURAL; INFILTRATION; INTRACAUDAL; PERINEURAL at 08:10

## 2018-02-07 NOTE — L&D DELIVERY NOTE
OF A VIABLE MALE INFANT OVER AN INTACT PERINEUM IN OA, +NUCHAL CORD, REDUCED ON THE PERINEUM. NO SHOULDER DYSTOCIA. PLACENTA DEL SPONT, INTACT WITH 3VC. 1ST DEGREE PV LACERATION HEMOSTATIC. EBL 200ml.

## 2018-02-07 NOTE — ANESTHESIA PROCEDURE NOTES
Epidural Block    Start time: 2/7/2018 8:03 AM  End time: 2/7/2018 8:11 AM  Performed by: Jone Zavaleta by: Robbi Wells     Pre-Procedure  Indication: labor epidural    Preanesthetic Checklist: patient identified, risks and benefits discussed, anesthesia consent, site marked, patient being monitored, timeout performed and anesthesia consent      Epidural:   Patient position:  Seated  Prep region:  Lumbar  Prep: Patient draped and DuraPrep    Location:  L2-3    Needle and Epidural Catheter:   Needle Type:  Tuohy  Needle Gauge:  17 G  Injection Technique:  Loss of resistance using saline  Attempts:  1  Catheter Size:  19 G  Catheter at Skin Depth (cm):  10  Depth in Epidural Space (cm):  4  Events: no blood with aspiration, no cerebrospinal fluid with aspiration, no paresthesia and negative aspiration test    Test Dose:  Bupivacaine 0.25% w/ epi and negative    Assessment:   Catheter Secured:  Tegaderm and tape  Insertion:  Uncomplicated  Patient tolerance:  Patient tolerated the procedure well with no immediate complications  Spinal portion:  A 25 g pencil point spinal needle was placed through the Touhy x1 attempt until CSF was obtained. 0.8 mL 0.25% bupivacaine with 1:200K epinephrine was deposited into the CSF. -paresthesia.

## 2018-02-07 NOTE — PROGRESS NOTES
Bedside shift change report given to CAMERON Tinoco by PRASHANTH Quick RN. Report included the following information SBAR, Kardex, Intake/Output, and MAR. Hourly Rounding performed by EFREM.   Tuyet Radford RN

## 2018-02-07 NOTE — PROGRESS NOTES
3534: Dr. Abbott Revesara at bedside. Epidural discussed, opportunities for questions given. Understanding voiced. Time out performed. Successful CSE performed.

## 2018-02-07 NOTE — PROGRESS NOTES
Plan of care reviewed with pt and SO with regard to cervidil, order for clindamycin, and ambien to help with sleep if need (denied)    0047 antciipated CC downtime starting at 0100    0600 CC back up.  Refer to labor flow sheet for assessment

## 2018-02-07 NOTE — PROGRESS NOTES
Bedside report given to [facility] RNC. Kardex, MAR, and current orders reviewed.

## 2018-02-07 NOTE — PROGRESS NOTES
5789: Deep variable decel noted. Turned to R side and IV fluid bolus given. 8163: Deep variable decel noted. SVE performed. 02/07/18 0902   Cervical Exam   Dilation (cm) 10   Eff 100 %   Vaginal exam done byPat soria, [address]: Dr. Angeles Antonio advised. 3130: Dr. Angeles Antonio at bedside for delivery.

## 2018-02-07 NOTE — PROGRESS NOTES
0710: Dr. Felton Sit at bedside. SVE performed. AROM-scant fluid, blood tinged. 02/07/18 0710   Membranes   Membrane Status AROM   Rupture Identifier Rupture 1   Rupture Date 02/07/18   Rupture Time 0710   Odor None   Amniotic Fluid Description Unable to determine   Amniotic Fluid Volume  Scant   Cervical Exam   Dilation (cm) 3   Eff 100 %   Station 0   Vaginal exam done by? Dr. Felton Sit     POC bolus for epidural, continue pitocin.

## 2018-02-07 NOTE — LACTATION NOTE
This note was copied from a baby's chart. Infant  for 60 minutes at first feed, followed by 2 sleepy feeding. Mom pumped for 4 ml;s of thick EBM. Infant took EBM and went to breast for 30 minutes with latch score of 8. Encourage responding to feeding cues and waking to feed every 2 to 3 hours if sleepy. Initiated pumping for sleepiness and Mom immediately has volume for infant. Mom pumping with 24 mm flanges. Mom has double electric Spectra pump at home. DIscussed sleepiness of earlier gestation infant and need to wake. Encourage asking for assistance as needed. Infant supplemented 5 ml's of EBM at 1815 followed by 30 minutes of breastfeeding with latch score of 8. Plan is for Mom to continue pumping and giving supplementation of EBM with breastfeeding. Initiated breastfeeding log and explained use. Mom given lanolin for prevention of nipple tenderness.

## 2018-02-07 NOTE — ADT AUTH CERT NOTES
Patient Demographics        Patient Name [address] Sex  Address Phone       Sandip Dhillon [phone] Female 1995 [address] [phone] (Home)  [phone] (Mobile) *Preferred*           CSN:       [id number]           Admit Date: Admit Time Room Bed       2018  3:55 PM 3308 [[id number]] 01 [[id number]]           Attending Providers        Provider Pager From Gerardo Slade MD  18       ADM  DEL     Delivery Date: 2018   Delivery Time: 9:14 AM   Delivery Type: Vaginal, Spontaneous Delivery  Sex:  male    Gestational Age: 36w7d      Measurements:  Birth Weight: 2.425 kg    Birth Length: 18.5\"          Delivery Clinician:  Beth Diane?:   Delivery Location: L&D

## 2018-02-07 NOTE — ANESTHESIA PREPROCEDURE EVALUATION
Anesthetic History   No history of anesthetic complications            Review of Systems / Medical History  Patient summary reviewed, nursing notes reviewed and pertinent labs reviewed    Pulmonary  Within defined limits                 Neuro/Psych         Psychiatric history     Cardiovascular  Within defined limits                Exercise tolerance: >4 METS     GI/Hepatic/Renal  Within defined limits              Endo/Other  Within defined limits           Other Findings              Physical Exam    Airway  Mallampati: II  TM Distance: 4 - 6 cm  Neck ROM: normal range of motion   Mouth opening: Normal     Cardiovascular  Regular rate and rhythm,  S1 and S2 normal,  no murmur, click, rub, or gallop             Dental  No notable dental hx       Pulmonary  Breath sounds clear to auscultation               Abdominal  GI exam deferred       Other Findings            Anesthetic Plan    ASA: 2  Anesthesia type: epidural            Anesthetic plan and risks discussed with: Patient

## 2018-02-08 PROCEDURE — 74011250636 HC RX REV CODE- 250/636: Performed by: SPECIALIST

## 2018-02-08 PROCEDURE — 74011250637 HC RX REV CODE- 250/637: Performed by: SPECIALIST

## 2018-02-08 PROCEDURE — 90715 TDAP VACCINE 7 YRS/> IM: CPT | Performed by: SPECIALIST

## 2018-02-08 PROCEDURE — 65410000002 HC RM PRIVATE OB

## 2018-02-08 RX ADMIN — IBUPROFEN 800 MG: 400 TABLET, FILM COATED ORAL at 05:35

## 2018-02-08 RX ADMIN — IBUPROFEN 800 MG: 400 TABLET, FILM COATED ORAL at 21:33

## 2018-02-08 RX ADMIN — IBUPROFEN 800 MG: 400 TABLET, FILM COATED ORAL at 13:40

## 2018-02-08 RX ADMIN — TETANUS TOXOID, REDUCED DIPHTHERIA TOXOID AND ACELLULAR PERTUSSIS VACCINE, ADSORBED 0.5 ML: 5; 2.5; 8; 8; 2.5 SUSPENSION INTRAMUSCULAR at 21:33

## 2018-02-08 NOTE — PROGRESS NOTES
Post-Partum Day Number 1 Progress Note    Shasha Naval     Information for the patient's :  Lily Devlin [[id number]]   Vaginal, Spontaneous Delivery   Patient doing well without significant complaint. Voiding without difficulty, normal lochia. Vitals:    Visit Vitals    /70 (BP 1 Location: Left arm, BP Patient Position: At rest)    Pulse 78    Temp 98.5 °F (36.9 °C)    Resp 14    Ht 5' 3\" (1.6 m)    Wt 64.9 kg (143 lb)    SpO2 96%    Breastfeeding Unknown    BMI 25.33 kg/m2     Temp (24hrs), Av.4 °F (36.9 °C), Min:97.5 °F (36.4 °C), Max:98.9 °F (37.2 °C)          Exam:  Patient without distress. Abdomen soft, fundus firm, nontender                Perineum with normal lochia noted. Lower extremities are negative for swelling, cords or tenderness.     Labs:     Lab Results   Component Value Date/Time    WBC 11.0 2018 05:15 PM    WBC 8.7 2017 09:41 PM    WBC 8.4 2017 12:14 AM    WBC 6.0 2017 07:59 PM    WBC 8.3 2016 07:35 PM    WBC 6.4 2016 09:40 AM    WBC 9.3 10/23/2016 08:27 PM    WBC 12.0 (H) 10/19/2016 04:23 AM    WBC 7.5 2016 08:55 PM    WBC 6.7 2016 11:52 AM    WBC CANCELED 2016 11:52 AM    WBC 10.2 (H) 2013 04:05 PM    HGB 11.4 (L) 2018 05:15 PM    HGB 11.4 (L) 2017 09:41 PM    HGB 12.4 2017 12:14 AM    HGB 11.8 2017 07:59 PM    HGB 11.5 2016 07:35 PM    HGB 13.6 2016 09:40 AM    HGB 13.2 10/23/2016 08:27 PM    HGB 13.5 10/19/2016 04:23 AM    HGB 12.7 2016 08:55 PM    HGB 12.8 2016 11:52 AM    HGB CANCELED 2016 11:52 AM    HGB 13.0 2013 04:05 PM    HCT 34.5 (L) 2018 05:15 PM    HCT 34.0 (L) 2017 09:41 PM    HCT 37.2 2017 12:14 AM    HCT 34.3 (L) 2017 07:59 PM    HCT 34.3 (L) 2016 07:35 PM    HCT 39.4 2016 09:40 AM    HCT 38.6 10/23/2016 08:27 PM    HCT 39.6 10/19/2016 04:23 AM    HCT 38.7 09/28/2016 08:55 PM    HCT 38.7 09/27/2016 11:52 AM    HCT CANCELED 09/26/2016 11:52 AM    HCT 39.6 11/19/2013 04:05 PM    PLATELET 057 17/71/0350 05:15 PM    PLATELET 728 65/84/0675 09:41 PM    PLATELET 415 09/03/6449 12:14 AM    PLATELET 143 23/34/0203 07:59 PM    PLATELET 897 43/59/4658 07:35 PM    PLATELET 180 37/39/0546 09:40 AM    PLATELET 048 67/09/6858 08:27 PM    PLATELET 769 41/37/8043 04:23 AM    PLATELET 100 19/44/9329 08:55 PM    PLATELET 100 85/63/5977 11:52 AM    PLATELET CANCELED 83/78/2133 11:52 AM    PLATELET 094 47/53/2143 04:05 PM       No results found for this or any previous visit (from the past 24 hour(s)). Assessment: Doing well, post partum day 1      Plan:  1. Continue routine postpartum and perineal care as well as maternal education.

## 2018-02-08 NOTE — ROUTINE PROCESS
Verbal shift change report given to PRASHANTH Camarena RN (oncoming nurse) by KADEN Byrd RN (offgoing nurse). Report included the following information SBAR, Procedure Summary, Intake/Output, MAR and Recent Results.

## 2018-02-08 NOTE — ROUTINE PROCESS
Bedside and Verbal shift change report given to DANO Rose (oncoming nurse) by Sam Chou RN (offgoing nurse). Report included the following information SBAR.

## 2018-02-08 NOTE — LACTATION NOTE
This note was copied from a baby's chart. Infant  2 times with 3 additaional attempts to feed and had 3 EBM supplements of 4ml's, 5 ml's and 5 ml's. Infant has been having blood sugar checks and dropped to 18 yesterday with physician order of supplementing with enfacare. Sicne supplementation with formula Mom has given 7 formula feedings and no breastfeedings. Infant had been nursing well after birth with Mom having good volume with pumping EBM. Encourage continued breastfeeding for lactogenesis and benefits as Mom wishes. Infant had 2 latch scores of 8 and 8. Infant has voided 3 times and stooled twice. 1445. Mom is back to breastfeeding. She states infant  from 1330 to 26 and back on breast at 26 with LATCH score of 9. Encourage continued frequency with feeding.

## 2018-02-09 VITALS
OXYGEN SATURATION: 96 % | BODY MASS INDEX: 25.34 KG/M2 | SYSTOLIC BLOOD PRESSURE: 141 MMHG | TEMPERATURE: 98.4 F | WEIGHT: 143 LBS | RESPIRATION RATE: 17 BRPM | DIASTOLIC BLOOD PRESSURE: 73 MMHG | HEIGHT: 63 IN | HEART RATE: 84 BPM

## 2018-02-09 PROCEDURE — 74011250637 HC RX REV CODE- 250/637: Performed by: SPECIALIST

## 2018-02-09 PROCEDURE — 90707 MMR VACCINE SC: CPT | Performed by: SPECIALIST

## 2018-02-09 PROCEDURE — 74011250636 HC RX REV CODE- 250/636: Performed by: SPECIALIST

## 2018-02-09 RX ORDER — IBUPROFEN 800 MG/1
800 TABLET ORAL
Qty: 30 TAB | Refills: 0 | Status: SHIPPED | OUTPATIENT
Start: 2018-02-09 | End: 2018-08-24

## 2018-02-09 RX ORDER — OXYCODONE AND ACETAMINOPHEN 5; 325 MG/1; MG/1
1 TABLET ORAL
Qty: 20 TAB | Refills: 0 | Status: SHIPPED | OUTPATIENT
Start: 2018-02-09 | End: 2018-08-24

## 2018-02-09 RX ADMIN — IBUPROFEN 800 MG: 400 TABLET, FILM COATED ORAL at 05:36

## 2018-02-09 RX ADMIN — MEASLES, MUMPS, AND RUBELLA VIRUS VACCINE LIVE 0.5 ML: 1000; 12500; 1000 INJECTION, POWDER, LYOPHILIZED, FOR SUSPENSION SUBCUTANEOUS at 07:55

## 2018-02-09 NOTE — ROUTINE PROCESS
Bedside shift change report given to PRASHANTH Earl RN (oncoming nurse) by DANO Gallardo, [address] (offgoing nurse). Report included the following information SBAR.

## 2018-02-09 NOTE — ROUTINE PROCESS
Bedside and Verbal shift change report given to DANO Plascencia (oncoming nurse) by Shira Malagon RN (offgoing nurse). Report included the following information SBAR.

## 2018-02-09 NOTE — DISCHARGE INSTRUCTIONS
Vaginal Childbirth (Postpartum Care): After Your Visit     Your Care Instructions    After childbirth (postpartum period), your body goes through many changes. Some of these changes happen over several weeks. It is easy to get too tired and overwhelmed during the first weeks after childbirth. Take it easy on yourself. You may find it hard to meet the extra demands on your energy and time. Get rest whenever you can, accept help from others, and eat well and drink plenty of fluids. Your body will slowly heal in the next few weeks. You may feel emotional during this time. Changes in your hormones can shift your mood without warning. No heavy lifting. No more than 8 lbs or the size of baby for 2-3 weeks. Avoid stairs. Limit to 1-2 times per day for the 1st week after delivery. No driving for the first week after delivery. Follow-up care is a key part of your treatment and safety. Be sure to make and go to all appointments, and call your doctor if you are having problems. Its also a good idea to know your test results and keep a list of the medicines you take. Around 4 to 6 weeks after your baby's birth, you will have a follow-up visit with your doctor. This visit is your time to talk to your doctor about anything you are concerned or curious about. Keep a list of questions to bring to your postpartum visit. Your questions might be about:  Changes in your breasts, such as lumps or soreness. When to expect your menstrual period to start again. What form of birth control is best for you. Weight you have put on during the pregnancy. Exercise options. What foods and drinks are best for you, especially if you are breast-feeding. Problems you might be having with breast-feeding. When you can have sex. Some women may want to talk about lubricants for the vagina. Any feelings of sadness or restlessness that you are having. How can you care for yourself at home?   Vaginal bleeding and cramps  After delivery, you will have a bloody discharge from the vagina. This will turn pink within a week and then white or yellow after about 10 days. It may last for 2 to 4 weeks or longer, until the uterus has healed. Do not rinse inside your vagina with fluids (douche). Do not worry if you pass some blood clots, as long as they are smaller than a golf ball. If you have a tear or stitches in your vaginal area, change the pad at least every 4 hours to prevent soreness and infection. You may have cramps for the first few days after childbirth. These are normal and occur as the uterus shrinks to normal size. Take an over-the-counter pain medicine, such as acetaminophen (Tylenol), ibuprofen (Advil, Motrin), or naproxen (Aleve), for cramps. Read and follow all instructions on the label. Do not take aspirin, because it can cause more bleeding. Do not take two or more pain medicines at the same time unless the doctor told you to. Many pain medicines have acetaminophen, which is Tylenol. Too much acetaminophen (Tylenol) can be harmful. Stitches  If you have stitches, they will dissolve on their own and do not need to be removed. Follow your doctor's instructions for cleaning the stitched area. Put ice or a cold pack on your painful area for 10 to 20 minutes at a time. , several times a day, for the first few days. Put a thin cloth between the ice and your skin. Sit in a few inches of warm water (sitz bath) 3 times a day and after bowel movements. The warm water helps with pain and itching. If you do not have a tub, a warm shower might help. Keep the area clean by pouring or spraying warm water over the area outside your vagina and anus after you use the toilet. Breast fullness  Your breasts may overfill (engorge) in the first few days after delivery. To help milk flow and to relieve pain, warm your breasts in the shower or by using warm, moist towels before nursing.   If you are not nursing, do not put warmth on your breasts or touch your breasts. Wear a tight bra or sports bra and use ice until the fullness goes away. This usually takes 2 to 3 days. Put ice or a cold pack on your breast after nursing to reduce swelling and pain. Put a thin cloth between the ice and your skin. Activity  Eat a balanced diet. Do not try to lose weight by cutting calories. Keep taking your prenatal vitamins, or take a multivitamin. ·  Get help with household chores from family or friends, if you can. Do not try to do it all yourself. Get as much rest as you can. Try to take naps when your baby sleeps during the day. Get some exercise every day. But do not do any heavy exercise until your doctor says it is okay. Do not have sex or use tampons until you have stopped bleeding and at least 4 to 6 weeks have passed since you gave birth. Talk to your doctor about birth control. You can get pregnant even before your period returns. Also, you can get pregnant while you are breast-feeding. Mental health  It is normal to have some sadness, anxiety, sleeplessness, and mood swings after you go home. If you feel upset or hopeless for more than a few days or are having trouble doing the things you need to do, talk to your doctor. · Many women get the \"baby blues\" during the first few days after childbirth. The \"baby blues\" usually peak around the fourth day and then ease up in less than 2 weeks. If you have the \"baby blues\" for more than a few days, or if you have thoughts of hurting yourself or your baby, call your doctor right away. Constipation and hemorrhoids  Drink plenty of fluids, enough so that your urine is light yellow or clear like water. If you have kidney, heart, or liver disease and have to limit fluids, talk with your doctor before you increase the amount of fluids you drink. Eat plenty of fiber each day to avoid constipation.  Include foods such as whole-grain breads and cereals, raw vegetables, raw and dried fruits, and beans.  · If you have hemorrhoids or swelling or pain around the opening of your vagina, try using cold and heat. You can put ice or a cold pack on the area for 10 to 20 minutes at a time. Put a thin cloth between the ice and your skin. Also try sitting in a few inches of warm water (sitz bath) 3 times a day and after bowel movements. When should you call for help? Call 911 anytime you think you may need emergency care. For example, call if:  You are thinking of hurting yourself, your baby, or anyone else. You have sudden, severe pain in your belly. You passed out (lost consciousness). Call your doctor now or seek immediate medical care if:  You have severe vaginal bleeding. You are passing blood clots and soaking through a pad each hour for 2 or more hours. Your vaginal bleeding seems to be getting heavier or is still bright red 4 days after delivery, or you pass blood clots larger than the size of a golf ball. You are dizzy or lightheaded, or you feel like you may faint. You are vomiting or cannot keep fluids down. You have a fever. You have new or more belly pain. You pass tissue (not just blood). Your vaginal discharge smells bad. Your belly feels tender or full and hard. Your breasts are continuously painful or red. You feel sad, anxious, or hopeless for more than a few days. Watch closely for changes in your health, and be sure to contact your doctor if you have any problems. Where can you learn more? Go to [email or web address]    Enter G272  in the search box to learn more about \"Postpartum Care: After Your Visit\". or    Go to [email or web address]    Enter Q237  in the search box to learn more about \"Vaginal Childbirth: After Your Visit\". © 4789-4325 Healthwise, Incorporated. Care instructions adapted under license by [employer] (which disclaims liability or warranty for this information).  This care instruction is for use with your licensed healthcare professional. If you have questions about a medical condition or this instruction, always ask your healthcare professional. Sharmin Minas any warranty or liability for your use of this information. InforSenseharImageBrief Activation    Thank you for requesting access to Service Route. Please follow the instructions below to securely access and download your online medical record. Service Route allows you to send messages to your doctor, view your test results, renew your prescriptions, schedule appointments, and more. How Do I Sign Up? 1. In your internet browser, go to [email or web address]. 2. Click on the First Time User? Click Here link in the Sign In box. You will see the New Member Sign Up page. 3. Enter your Service Route Access Code exactly as it appears below. You will not need to use this code after youve completed the sign-up process. If you do not sign up before the expiration date, you must request a new code. Service Route Access Code: Activation code not generated  Current Service Route Status: Active (This is the date your Service Route access code will )    4. Enter the last four digits of your Social Security Number (xxxx) and Date of Birth (mm/dd/yyyy) as indicated and click Submit. You will be taken to the next sign-up page. 5. Create a Service Route ID. This will be your Service Route login ID and cannot be changed, so think of one that is secure and easy to remember. 6. Create a Service Route password. You can change your password at any time. 7. Enter your Password Reset Question and Answer. This can be used at a later time if you forget your password. 8. Enter your e-mail address. You will receive e-mail notification when new information is available in [address] 9. Click Sign Up. You can now view and download portions of your medical record. 10. Click the Download Summary menu link to download a portable copy of your medical information.     Additional Information    If you have questions, please visit the Frequently Asked Questions section of the [employer] website at [email or web address] Remember, [employer] is NOT to be used for urgent needs. For medical emergencies, dial 911.

## 2018-02-09 NOTE — PROGRESS NOTES
Post-Partum Day Number 2 Progress Note    Lysle Leader     Information for the patient's :  John Meehan [[id number]]   Vaginal, Spontaneous Delivery   Patient doing well without significant complaint. Voiding without difficulty, normal lochia. Vitals:    Visit Vitals    /73 (BP 1 Location: Right arm, BP Patient Position: At rest)    Pulse 84    Temp 98.4 °F (36.9 °C)    Resp 17    Ht 5' 3\" (1.6 m)    Wt 64.9 kg (143 lb)    SpO2 96%    Breastfeeding Unknown    BMI 25.33 kg/m2     Temp (24hrs), Av.4 °F (36.9 °C), Min:98.1 °F (36.7 °C), Max:98.6 °F (37 °C)          Exam:         Patient without distress. Abdomen soft, fundus firm, nontender                Lower extremities are negative for swelling, cords or tenderness.     Labs:     Lab Results   Component Value Date/Time    WBC 11.0 2018 05:15 PM    WBC 8.7 2017 09:41 PM    WBC 8.4 2017 12:14 AM    WBC 6.0 2017 07:59 PM    WBC 8.3 2016 07:35 PM    WBC 6.4 2016 09:40 AM    WBC 9.3 10/23/2016 08:27 PM    WBC 12.0 (H) 10/19/2016 04:23 AM    WBC 7.5 2016 08:55 PM    WBC 6.7 2016 11:52 AM    WBC CANCELED 2016 11:52 AM    WBC 10.2 (H) 2013 04:05 PM    HGB 11.4 (L) 2018 05:15 PM    HGB 11.4 (L) 2017 09:41 PM    HGB 12.4 2017 12:14 AM    HGB 11.8 2017 07:59 PM    HGB 11.5 2016 07:35 PM    HGB 13.6 2016 09:40 AM    HGB 13.2 10/23/2016 08:27 PM    HGB 13.5 10/19/2016 04:23 AM    HGB 12.7 2016 08:55 PM    HGB 12.8 2016 11:52 AM    HGB CANCELED 2016 11:52 AM    HGB 13.0 2013 04:05 PM    HCT 34.5 (L) 2018 05:15 PM    HCT 34.0 (L) 2017 09:41 PM    HCT 37.2 2017 12:14 AM    HCT 34.3 (L) 2017 07:59 PM    HCT 34.3 (L) 2016 07:35 PM    HCT 39.4 2016 09:40 AM    HCT 38.6 10/23/2016 08:27 PM    HCT 39.6 10/19/2016 04:23 AM    HCT 38.7 2016 08:55 PM    HCT 38.7 2016 11:52 AM    HCT CANCELED 09/26/2016 11:52 AM    HCT 39.6 11/19/2013 04:05 PM    PLATELET 355 49/13/6175 05:15 PM    PLATELET 932 50/13/5616 09:41 PM    PLATELET 441 16/34/8180 12:14 AM    PLATELET 532 04/29/7748 07:59 PM    PLATELET 173 67/99/5172 07:35 PM    PLATELET 644 73/80/0758 09:40 AM    PLATELET 992 33/60/3309 08:27 PM    PLATELET 787 66/78/1783 04:23 AM    PLATELET 203 83/64/7367 08:55 PM    PLATELET 448 54/36/4346 11:52 AM    PLATELET CANCELED 45/73/8417 11:52 AM    PLATELET 883 62/27/3882 04:05 PM       No results found for this or any previous visit (from the past 24 hour(s)). Assessment: Doing well, post partum day 2      Plan:   1. Discharge home today  2. Follow up in office in 6 weeks with Lenka Gaona MD  3. Post partum activity advised, diet as tolerated  4.  Discharge Medications: ibuprofen, percocet and medications prior to admission

## 2018-02-20 NOTE — DISCHARGE SUMMARY
Obstetrical Discharge Summary     Name: Shaan Howard MRN: [id number]  SSN: [id number]    YOB: 1995  Age: 26 y.o. Sex: female      Allergies: Penicillins    Admit Date: 2018    Discharge Date: 2018     Admitting Physician: Julissa Bowen MD     Attending Physician:  Kimberlyn att. providers found     * Admission Diagnoses: Cervidil    * Discharge Diagnoses:   Information for the patient's :  Brenda Brandon [[id number]]   Delivery of a 2.425 kg male infant via Vaginal, Spontaneous Delivery on 2018 at 9:14 AM  by . Apgars were 9 and 9. Additional Diagnoses:   Hospital Problems as of 2018  Date Reviewed: 2017          Codes Class Noted - Resolved POA    Pregnant ICD-10-CM: Z34.90  ICD-9-CM: V22.2  2018 - Present Unknown             Lab Results   Component Value Date/Time    Rubella, External IMMUNE 2017    ABO,Rh O POSITIVE 2017      Immunization History   Administered Date(s) Administered    MMR 2018    Tdap 2018       * Procedures:   * No surgery found *           * Discharge Condition: stable    * Hospital Course: Normal hospital course following the delivery. * Disposition: Home    Discharge Medications:   Discharge Medication List as of 2018  8:03 AM      START taking these medications    Details   ibuprofen (MOTRIN) 800 mg tablet Take 1 Tab by mouth every eight (8) hours as needed for Pain., Print, Disp-30 Tab, R-0      oxyCODONE-acetaminophen (PERCOCET) 5-325 mg per tablet Take 1 Tab by mouth every four (4) hours as needed. Max Daily Amount: 6 Tabs., Print, Disp-20 Tab, R-0         CONTINUE these medications which have NOT CHANGED    Details   metroNIDAZOLE (FLAGYL) 250 mg tablet Take 500 mg by mouth three (3) times daily. For bacterial vaginosis, since 17, Historical Med      PNV No12-Iron-FA-DSS-OM-3 29 mg iron-1 mg -50 mg CPKD Take  by mouth., Historical Med             * Follow-up Care/Patient Instructions:   Activity: Activity as tolerated  Diet: Regular Diet  Wound Care: None needed    Follow-up Information     Follow up With Details Comments [address] [place], [place] [address]MD [id number]  [phone]             Signed By:  Cheryl Garcia MD     February 20, 2018

## 2018-07-26 ENCOUNTER — HOSPITAL ENCOUNTER (EMERGENCY)
Age: 23
Discharge: HOME | End: 2018-07-26
Attending: EMERGENCY MEDICINE
Payer: MEDICAID

## 2018-07-26 VITALS
TEMPERATURE: 98.7 F | HEIGHT: 63 IN | BODY MASS INDEX: 22.68 KG/M2 | HEART RATE: 84 BPM | RESPIRATION RATE: 16 BRPM | OXYGEN SATURATION: 100 % | WEIGHT: 128 LBS | SYSTOLIC BLOOD PRESSURE: 123 MMHG | DIASTOLIC BLOOD PRESSURE: 68 MMHG

## 2018-07-26 DIAGNOSIS — M79.642 LEFT HAND PAIN: Primary | ICD-10-CM

## 2018-07-26 PROCEDURE — 99282 EMERGENCY DEPT VISIT SF MDM: CPT

## 2018-07-26 PROCEDURE — L3809 WHFO W/O JOINTS PRE OTS: HCPCS

## 2018-07-26 RX ORDER — PREDNISONE 10 MG/1
TABLET ORAL
Qty: 21 TAB | Refills: 0 | Status: SHIPPED | OUTPATIENT
Start: 2018-07-26 | End: 2018-08-24

## 2018-07-26 RX ORDER — OXYCODONE AND ACETAMINOPHEN 5; 325 MG/1; MG/1
1 TABLET ORAL
Qty: 10 TAB | Refills: 0 | Status: SHIPPED | OUTPATIENT
Start: 2018-07-26 | End: 2018-08-24

## 2018-07-26 RX ORDER — IBUPROFEN 800 MG/1
800 TABLET ORAL
Qty: 20 TAB | Refills: 0 | Status: SHIPPED | OUTPATIENT
Start: 2018-07-26 | End: 2018-08-02

## 2018-07-26 NOTE — LETTER
[address] 
[facility] EMERGENCY DEPT 
[address] [place] [address] [id number] [phone] Work/School Note Date: 7/26/2018 To Whom It May concern: 
 
Елена Slater was seen and treated today in the emergency room by the following provider(s): 
Attending Provider: Farzaneh Orona DO Physician Assistant: JOVANNY Cota. Елена Slater may return to work on [id number]. Sincerely, JOVANNY Cota

## 2018-07-26 NOTE — ED PROVIDER NOTES
EMERGENCY DEPARTMENT HISTORY AND PHYSICAL EXAM 
 
 
Date: 7/26/2018 Patient Name: Forest Pina 
 
History of Presenting Illness Chief Complaint Patient presents with  
 Hand Pain  
  c/o left hand and wrist pain onset Jan of last year, comes and goes. increased pain with movement and lifting items, denies injury History Provided By: Patient HPI: Forest Pina, 26 y.o. female  presents ambulatory to the ED with cc of 18 months of 8 out of 10 intermittent, aching left dorsal hand pain that is worse with movement and lifting. She denies any specific injury. She specifically denies any fevers, chills, nausea, vomiting, chest pain, shortness of breath, headache, rash, diarrhea, sweating or weight loss. Chief Complaint: left hand pain Duration: 18 Months Timing:  Intermittent Location: left hand / wrist 
Quality: Aching Severity: 8 out of 10 Modifying Factors: worse with movement / lifting Associated Symptoms: denies any other associated signs or symptoms There are no other complaints, changes, or physical findings at this time. PCP: Jazmin Antonio MD 
 
Current Outpatient Prescriptions Medication Sig Dispense Refill  oxyCODONE-acetaminophen (PERCOCET) 5-325 mg per tablet Take 1 Tab by mouth every four (4) hours as needed for Pain. Max Daily Amount: 6 Tabs. 10 Tab 0  predniSONE (STERAPRED DS) 10 mg dose pack Per Dose Pack instructions 21 Tab 0  ibuprofen (MOTRIN) 800 mg tablet Take 1 Tab by mouth every eight (8) hours as needed for Pain for up to 7 days. 20 Tab 0  ibuprofen (MOTRIN) 800 mg tablet Take 1 Tab by mouth every eight (8) hours as needed for Pain. 30 Tab 0  
 oxyCODONE-acetaminophen (PERCOCET) 5-325 mg per tablet Take 1 Tab by mouth every four (4) hours as needed. Max Daily Amount: 6 Tabs. 20 Tab 0  
 metroNIDAZOLE (FLAGYL) 250 mg tablet Take 500 mg by mouth three (3) times daily. For bacterial vaginosis, since 12/28/17  PNV No12-Iron-FA-DSS-OM-3 29 mg iron-1 mg -50 mg CPKD Take  by mouth. Past History Past Medical History: 
Past Medical History:  
Diagnosis Date  Chlamydia  Endocrine disease   
 hypoglycemia  Gastrointestinal disorder GERD  Hypokalemia  Ill-defined condition   
 kidney failure  Panic attacks  Psychiatric disorder   
 anxiety, panic disorder  Trichomonal infection Past Surgical History: No past surgical history on file. Family History: 
Family History Problem Relation Age of Onset  Migraines Mother Social History: 
Social History Substance Use Topics  Smoking status: Never Smoker  Smokeless tobacco: Never Used  Alcohol use No  
 
 
Allergies: Allergies Allergen Reactions  Penicillins Hives Review of Systems Review of Systems Constitutional: Negative for fatigue and fever. HENT: Negative for ear pain and sore throat. Eyes: Negative for pain, redness and visual disturbance. Respiratory: Negative for cough and shortness of breath. Cardiovascular: Negative for chest pain and palpitations. Gastrointestinal: Negative for abdominal pain, nausea and vomiting. Genitourinary: Negative for dysuria, frequency and urgency. Musculoskeletal: Negative for back pain, gait problem, neck pain and neck stiffness. Left hand pain Skin: Negative for rash and wound. Neurological: Negative for dizziness, weakness, light-headedness, numbness and headaches. Physical Exam  
Physical Exam  
Constitutional: She is oriented to person, place, and time. She appears well-developed and well-nourished. Non-toxic appearance. No distress. HENT:  
Head: Normocephalic and atraumatic. Right Ear: External ear normal.  
Left Ear: External ear normal.  
Nose: Nose normal.  
Mouth/Throat: Uvula is midline. No trismus in the jaw. Eyes: Conjunctivae and EOM are normal. Pupils are equal, round, and reactive to light. No scleral icterus.   
Neck: Normal range of motion and full passive range of motion without pain. Cardiovascular: Normal rate and regular rhythm. Pulmonary/Chest: Effort normal. No accessory muscle usage. No tachypnea. No respiratory distress. She has no decreased breath sounds. She has no wheezes. Abdominal: Soft. There is no tenderness. Musculoskeletal: Normal range of motion. Left hand: She exhibits tenderness. She exhibits normal range of motion, no bony tenderness, no deformity, no laceration and no swelling. Normal sensation noted. Hands: 
Neurological: She is alert and oriented to person, place, and time. She is not disoriented. No cranial nerve deficit. GCS eye subscore is 4. GCS verbal subscore is 5. GCS motor subscore is 6. Skin: Skin is intact. No rash noted. Psychiatric: She has a normal mood and affect. Her speech is normal.  
Nursing note and vitals reviewed. Diagnostic Study Results Labs - No results found for this or any previous visit (from the past 12 hour(s)). Radiologic Studies - No orders to display CT Results  (Last 48 hours) None CXR Results  (Last 48 hours) None Medical Decision Making I am the first provider for this patient. I reviewed the vital signs, available nursing notes, past medical history, past surgical history, family history and social history. Vital Signs-Reviewed the patient's vital signs. Patient Vitals for the past 12 hrs: 
 Temp Pulse Resp BP SpO2  
07/26/18 0823 98.7 °F (37.1 °C) 84 16 123/68 100 % Records Reviewed: Nursing Notes, Old Medical Records, Previous Radiology Studies, Previous Laboratory Studies and  Provider Notes (Medical Decision Making): Afebrile; well appearing; reassuring exam; no specific injury; additional testing deferred; will place in a splint and refer to Ortho ED Course:  
Initial assessment performed.  The patients presenting problems have been discussed, and they are in agreement with the care plan formulated and outlined with them. I have encouraged them to ask questions as they arise throughout their visit. Disposition: 
Discharge PLAN: 
1. Current Discharge Medication List  
  
START taking these medications Details  
!! oxyCODONE-acetaminophen (PERCOCET) 5-325 mg per tablet Take 1 Tab by mouth every four (4) hours as needed for Pain. Max Daily Amount: 6 Tabs. Qty: 10 Tab, Refills: 0 Associated Diagnoses: Left hand pain  
  
predniSONE (STERAPRED DS) 10 mg dose pack Per Dose Pack instructions Qty: 21 Tab, Refills: 0  
  
!! ibuprofen (MOTRIN) 800 mg tablet Take 1 Tab by mouth every eight (8) hours as needed for Pain for up to 7 days. Qty: 20 Tab, Refills: 0  
  
 !! - Potential duplicate medications found. Please discuss with provider. CONTINUE these medications which have NOT CHANGED Details  
!! ibuprofen (MOTRIN) 800 mg tablet Take 1 Tab by mouth every eight (8) hours as needed for Pain. Qty: 30 Tab, Refills: 0  
  
!! oxyCODONE-acetaminophen (PERCOCET) 5-325 mg per tablet Take 1 Tab by mouth every four (4) hours as needed. Max Daily Amount: 6 Tabs. Qty: 20 Tab, Refills: 0 Associated Diagnoses: Pregnancy, unspecified gestational age  
  
 !! - Potential duplicate medications found. Please discuss with provider. 2.  
Follow-up Information Follow up With Details Comments Contact Info Jazzy Rose MD Schedule an appointment as soon as possible for a visit ORTHO / HAND: call to schedule follow up [address] 
[phone] Return to ED if worse Diagnosis Clinical Impression: 1. Left hand pain

## 2018-07-26 NOTE — ED NOTES
Patient discharged by JOVANNY Potts. Patient provided with discharge instructions Rx and instructions on follow up care. Patient out of ED ambulatory accompanied by self.

## 2018-08-24 ENCOUNTER — HOSPITAL ENCOUNTER (EMERGENCY)
Age: 23
Discharge: HOME | End: 2018-08-24
Attending: EMERGENCY MEDICINE
Payer: MEDICAID

## 2018-08-24 VITALS
DIASTOLIC BLOOD PRESSURE: 75 MMHG | TEMPERATURE: 98.4 F | OXYGEN SATURATION: 100 % | RESPIRATION RATE: 16 BRPM | BODY MASS INDEX: 22.58 KG/M2 | HEART RATE: 62 BPM | WEIGHT: 127.43 LBS | HEIGHT: 63 IN | SYSTOLIC BLOOD PRESSURE: 119 MMHG

## 2018-08-24 DIAGNOSIS — O26.811 PREGNANCY RELATED FATIGUE IN FIRST TRIMESTER: ICD-10-CM

## 2018-08-24 DIAGNOSIS — R53.83 FATIGUE, UNSPECIFIED TYPE: Primary | ICD-10-CM

## 2018-08-24 LAB
ALBUMIN SERPL-MCNC: 3.8 G/DL (ref 3.5–5)
ALBUMIN/GLOB SERPL: 1.1 {RATIO} (ref 1.1–2.2)
ALP SERPL-CCNC: 36 U/L (ref 45–117)
ALT SERPL-CCNC: 15 U/L (ref 12–78)
ANION GAP SERPL CALC-SCNC: 7 MMOL/L (ref 5–15)
APPEARANCE UR: CLEAR
AST SERPL-CCNC: 10 U/L (ref 15–37)
BACTERIA URNS QL MICRO: NEGATIVE /HPF
BASOPHILS # BLD: 0 K/UL (ref 0–0.1)
BASOPHILS NFR BLD: 0 % (ref 0–1)
BILIRUB SERPL-MCNC: 0.6 MG/DL (ref 0.2–1)
BILIRUB UR QL: NEGATIVE
BUN SERPL-MCNC: 9 MG/DL (ref 6–20)
BUN/CREAT SERPL: 14 (ref 12–20)
CALCIUM SERPL-MCNC: 8.7 MG/DL (ref 8.5–10.1)
CHLORIDE SERPL-SCNC: 107 MMOL/L (ref 97–108)
CO2 SERPL-SCNC: 24 MMOL/L (ref 21–32)
COLOR UR: NORMAL
CREAT SERPL-MCNC: 0.64 MG/DL (ref 0.55–1.02)
DIFFERENTIAL METHOD BLD: ABNORMAL
EOSINOPHIL # BLD: 0.1 K/UL (ref 0–0.4)
EOSINOPHIL NFR BLD: 1 % (ref 0–7)
EPITH CASTS URNS QL MICRO: NORMAL /LPF
ERYTHROCYTE [DISTWIDTH] IN BLOOD BY AUTOMATED COUNT: 12.3 % (ref 11.5–14.5)
GLOBULIN SER CALC-MCNC: 3.5 G/DL (ref 2–4)
GLUCOSE SERPL-MCNC: 82 MG/DL (ref 65–100)
GLUCOSE UR STRIP.AUTO-MCNC: NEGATIVE MG/DL
HCG SERPL QL: POSITIVE
HCT VFR BLD AUTO: 34.3 % (ref 35–47)
HGB BLD-MCNC: 11.8 G/DL (ref 11.5–16)
HGB UR QL STRIP: NEGATIVE
IMM GRANULOCYTES # BLD: 0 K/UL (ref 0–0.04)
IMM GRANULOCYTES NFR BLD AUTO: 0 % (ref 0–0.5)
KETONES UR QL STRIP.AUTO: NEGATIVE MG/DL
LEUKOCYTE ESTERASE UR QL STRIP.AUTO: NEGATIVE
LYMPHOCYTES # BLD: 1.6 K/UL (ref 0.8–3.5)
LYMPHOCYTES NFR BLD: 22 % (ref 12–49)
MCH RBC QN AUTO: 30.1 PG (ref 26–34)
MCHC RBC AUTO-ENTMCNC: 34.4 G/DL (ref 30–36.5)
MCV RBC AUTO: 87.5 FL (ref 80–99)
MONOCYTES # BLD: 0.5 K/UL (ref 0–1)
MONOCYTES NFR BLD: 7 % (ref 5–13)
NEUTS SEG # BLD: 5.3 K/UL (ref 1.8–8)
NEUTS SEG NFR BLD: 71 % (ref 32–75)
NITRITE UR QL STRIP.AUTO: NEGATIVE
NRBC # BLD: 0 K/UL (ref 0–0.01)
NRBC BLD-RTO: 0 PER 100 WBC
PH UR STRIP: 7.5 [PH] (ref 5–8)
PLATELET # BLD AUTO: 244 K/UL (ref 150–400)
PMV BLD AUTO: 10.5 FL (ref 8.9–12.9)
POTASSIUM SERPL-SCNC: 3.8 MMOL/L (ref 3.5–5.1)
PROT SERPL-MCNC: 7.3 G/DL (ref 6.4–8.2)
PROT UR STRIP-MCNC: NEGATIVE MG/DL
RBC # BLD AUTO: 3.92 M/UL (ref 3.8–5.2)
RBC #/AREA URNS HPF: NORMAL /HPF (ref 0–5)
SODIUM SERPL-SCNC: 138 MMOL/L (ref 136–145)
SP GR UR REFRACTOMETRY: 1.01 (ref 1–1.03)
UA: UC IF INDICATED,UAUC: NORMAL
UROBILINOGEN UR QL STRIP.AUTO: 0.2 EU/DL (ref 0.2–1)
WBC # BLD AUTO: 7.6 K/UL (ref 3.6–11)
WBC URNS QL MICRO: NORMAL /HPF (ref 0–4)

## 2018-08-24 PROCEDURE — 84703 CHORIONIC GONADOTROPIN ASSAY: CPT | Performed by: PHYSICIAN ASSISTANT

## 2018-08-24 PROCEDURE — 36415 COLL VENOUS BLD VENIPUNCTURE: CPT | Performed by: PHYSICIAN ASSISTANT

## 2018-08-24 PROCEDURE — 85025 COMPLETE CBC W/AUTO DIFF WBC: CPT | Performed by: PHYSICIAN ASSISTANT

## 2018-08-24 PROCEDURE — 80053 COMPREHEN METABOLIC PANEL: CPT | Performed by: PHYSICIAN ASSISTANT

## 2018-08-24 PROCEDURE — 99283 EMERGENCY DEPT VISIT LOW MDM: CPT

## 2018-08-24 PROCEDURE — 81001 URINALYSIS AUTO W/SCOPE: CPT | Performed by: PHYSICIAN ASSISTANT

## 2018-08-24 NOTE — ED PROVIDER NOTES
EMERGENCY DEPARTMENT HISTORY AND PHYSICAL EXAM    Date: 2018  Patient Name: Donavon Luciano    History of Presenting Illness     Chief Complaint   Patient presents with    Fatigue     states weakness in arms and legs begins in the middle of the day, denies pain, states 7 weeks pregnant     HPI: Donavon Luciano is a 26 y.o. female w/ no sig pmhx, A0 7 weeks pregnant, presents to the ED for weakness x 2 days. Pt says she has had heaviness in her hands and feet for a few hours a day for 2 days. She says she was hospitalized once before for kidney failure of unknown origin. She says her kidney failure resolved during her stay in the hospital. She also has urinary urgency and frequency. She denies n/v, cp/sob, flank pain, abd pain, vaginal bleeding, among other assoc sx's. PCP: Enrico Jeter MD    Current Outpatient Prescriptions   Medication Sig Dispense Refill    PNV No12-Iron-FA-DSS-OM-3 29 mg iron-1 mg -50 mg CPKD Take  by mouth. Past History     Past Medical History:  Past Medical History:   Diagnosis Date    Chlamydia     Endocrine disease     hypoglycemia    Gastrointestinal disorder     GERD    Hypokalemia     Ill-defined condition     kidney failure    Panic attacks     Psychiatric disorder     anxiety, panic disorder    Trichomonal infection        Past Surgical History:  History reviewed. No pertinent surgical history. Family History:  Family History   Problem Relation Age of Onset   [facility] Migraines Mother        Social History:  Social History   Substance Use Topics    Smoking status: Never Smoker    Smokeless tobacco: Never Used    Alcohol use No       Allergies: Allergies   Allergen Reactions    Penicillins Hives         Review of Systems   Review of Systems   Constitutional: Negative for chills, fever and unexpected weight change. Respiratory: Negative for shortness of breath. Cardiovascular: Negative for chest pain and palpitations.    Gastrointestinal: Negative for abdominal pain, nausea and vomiting. Genitourinary: Positive for frequency and urgency. Negative for flank pain, pelvic pain, vaginal bleeding and vaginal discharge. Musculoskeletal: Negative for arthralgias and myalgias. Skin: Negative for rash. Neurological: Positive for weakness. Negative for light-headedness and headaches. All other systems reviewed and are negative. Physical Exam     Vitals:    08/24/18 0959 08/24/18 1251   BP: 117/59 119/75   Pulse: 70 62   Resp: 16 16   Temp: 98.4 °F (36.9 °C)    SpO2: 100% 100%   Weight: 57.8 kg (127 lb 6.8 oz)    Height: 5' 3\" (1.6 m)      Physical Exam   Constitutional: She is oriented to person, place, and time. She appears well-developed and well-nourished. No distress. HENT:   Head: Normocephalic and atraumatic. Cardiovascular: Normal rate, regular rhythm and normal heart sounds. Exam reveals no gallop and no friction rub. No murmur heard. Pulmonary/Chest: Effort normal and breath sounds normal.   Abdominal: Soft. Bowel sounds are normal. She exhibits no distension and no mass. There is no tenderness. There is no rebound and no guarding. Neurological: She is alert and oriented to person, place, and time. Skin: Skin is warm and dry. She is not diaphoretic. Psychiatric: She has a normal mood and affect.  Her behavior is normal. Judgment and thought content normal.         Diagnostic Study Results     Labs -     Recent Results (from the past 12 hour(s))   CBC WITH AUTOMATED DIFF    Collection Time: 08/24/18 11:15 AM   Result Value Ref Range    WBC 7.6 3.6 - 11.0 K/uL    RBC 3.92 3.80 - 5.20 M/uL    HGB 11.8 11.5 - 16.0 g/dL    HCT 34.3 (L) 35.0 - 47.0 %    MCV 87.5 80.0 - 99.0 FL    MCH 30.1 26.0 - 34.0 PG    MCHC 34.4 30.0 - 36.5 g/dL    RDW 12.3 11.5 - 14.5 %    PLATELET 123 391 - 240 K/uL    MPV 10.5 8.9 - 12.9 FL    NRBC 0.0 0  WBC    ABSOLUTE NRBC 0.00 0.00 - 0.01 K/uL    NEUTROPHILS 71 32 - 75 %    LYMPHOCYTES 22 12 - 49 % MONOCYTES 7 5 - 13 %    EOSINOPHILS 1 0 - 7 %    BASOPHILS 0 0 - 1 %    IMMATURE GRANULOCYTES 0 0.0 - 0.5 %    ABS. NEUTROPHILS 5.3 1.8 - 8.0 K/UL    ABS. LYMPHOCYTES 1.6 0.8 - 3.5 K/UL    ABS. MONOCYTES 0.5 0.0 - 1.0 K/UL    ABS. EOSINOPHILS 0.1 0.0 - 0.4 K/UL    ABS. BASOPHILS 0.0 0.0 - 0.1 K/UL    ABS. IMM. GRANS. 0.0 0.00 - 0.04 K/UL    DF AUTOMATED     METABOLIC PANEL, COMPREHENSIVE    Collection Time: 08/24/18 11:15 AM   Result Value Ref Range    Sodium 138 136 - 145 mmol/L    Potassium 3.8 3.5 - 5.1 mmol/L    Chloride 107 97 - 108 mmol/L    CO2 24 21 - 32 mmol/L    Anion gap 7 5 - 15 mmol/L    Glucose 82 65 - 100 mg/dL    BUN 9 6 - 20 MG/DL    Creatinine 0.64 0.55 - 1.02 MG/DL    BUN/Creatinine ratio 14 12 - 20      GFR est AA >60 >60 ml/min/1.73m2    GFR est non-AA >60 >60 ml/min/1.73m2    Calcium 8.7 8.5 - 10.1 MG/DL    Bilirubin, total 0.6 0.2 - 1.0 MG/DL    ALT (SGPT) 15 12 - 78 U/L    AST (SGOT) 10 (L) 15 - 37 U/L    Alk.  phosphatase 36 (L) 45 - 117 U/L    Protein, total 7.3 6.4 - 8.2 g/dL    Albumin 3.8 3.5 - 5.0 g/dL    Globulin 3.5 2.0 - 4.0 g/dL    A-G Ratio 1.1 1.1 - 2.2     URINALYSIS W/ REFLEX CULTURE    Collection Time: 08/24/18 11:15 AM   Result Value Ref Range    Color YELLOW/STRAW      Appearance CLEAR CLEAR      Specific gravity 1.013 1.003 - 1.030      pH (UA) 7.5 5.0 - 8.0      Protein NEGATIVE  NEG mg/dL    Glucose NEGATIVE  NEG mg/dL    Ketone NEGATIVE  NEG mg/dL    Bilirubin NEGATIVE  NEG      Blood NEGATIVE  NEG      Urobilinogen 0.2 0.2 - 1.0 EU/dL    Nitrites NEGATIVE  NEG      Leukocyte Esterase NEGATIVE  NEG      WBC 0-4 0 - 4 /hpf    RBC 0-5 0 - 5 /hpf    Epithelial cells FEW FEW /lpf    Bacteria NEGATIVE  NEG /hpf    UA:UC IF INDICATED CULTURE NOT INDICATED BY UA RESULT CNI     HCG QL SERUM    Collection Time: 08/24/18 11:15 AM   Result Value Ref Range    HCG, Ql. POSITIVE (A) NEG         Radiologic Studies -   No orders to display     CT Results  (Last 48 hours)    None CXR Results  (Last 48 hours)    None            Medical Decision Making   I am the first provider for this patient. I reviewed the vital signs, available nursing notes, past medical history, past surgical history, family history and social history. Vital Signs-Reviewed the patient's vital signs. Records Reviewed: Nursing Notes and Old Medical Records    ED Course:   Initial assessment performed. The patients presenting problems have been discussed, and they are in agreement with the care plan formulated and outlined with them. I have encouraged them to ask questions as they arise throughout their visit. Available labs, imaging, and vital signs reviewed and read in full detail  Vitals:    08/24/18 0959 08/24/18 1251   BP: 117/59 119/75   BP 1 Location: Left arm Left arm   BP Patient Position: Sitting Sitting   Pulse: 70 62   Resp: 16 16   Temp: 98.4 °F (36.9 °C)    SpO2: 100% 100%   Weight: 57.8 kg (127 lb 6.8 oz)    Height: 5' 3\" (1.6 m)        On re evaluation pt is resting comfortably and is requesting discharge. Disposition:  D/c home    DISCHARGE NOTE:   The patient has been re-evaluated and is ready for discharge. Patient has no new complaints, changes, or physical findings. I Counseled the patient on diagnosis and care plan. All available lab and imaging results have been reviewed by me and were discussed with the patient, including all incidental findings. The likelihood of other entities in the differential is insufficient to justify any further testing for them. This was explained to the patient. Patient agrees with plan and agrees to follow up with pcp as recommended, or return to the ED if their symptoms worsen. All medications were reviewed with the patient; will d/c home with no meds. All of pt's questions and concerns were addressed. The patient was advised that new or worsening symptoms would require further evaluation and should prompt immediate return to the Emergency Department. Discharge instructions have been provided and explained to the patient, along with reasons to return to the ED. Patient voices understanding and is agreeable with the plan for discharge. Patient is ready to go home. Follow-up Information     Follow up With Details Comments Contact Info    [facility] EMERGENCY DEPT Go to If symptoms worsen [address] Alex Dalton MD Schedule an appointment as soon as possible for a visit  [employer]  [phone]            Current Discharge Medication List          Provider Notes (Medical Decision Making):     Procedures:  Procedures        Diagnosis     Clinical Impression:   1. Fatigue, unspecified type    2.  Pregnancy related fatigue in first trimester

## 2018-08-24 NOTE — DISCHARGE INSTRUCTIONS
Fatigue: Care Instructions  Your Care Instructions    Fatigue is a feeling of tiredness, exhaustion, or lack of energy. You may feel fatigue because of too much or not enough activity. It can also come from stress, lack of sleep, boredom, and poor diet. Many medical problems, such as viral infections, can cause fatigue. Emotional problems, especially depression, are often the cause of fatigue. Fatigue is most often a symptom of another problem. Treatment for fatigue depends on the cause. For example, if you have fatigue because you have a certain health problem, treating this problem also treats your fatigue. If depression or anxiety is the cause, treatment may help. Follow-up care is a key part of your treatment and safety. Be sure to make and go to all appointments, and call your doctor if you are having problems. It's also a good idea to know your test results and keep a list of the medicines you take. How can you care for yourself at home? · Get regular exercise. But don't overdo it. Go back and forth between rest and exercise. · Get plenty of rest.  · Eat a healthy diet. Do not skip meals, especially breakfast.  · Reduce your use of caffeine, tobacco, and alcohol. Caffeine is most often found in coffee, tea, cola drinks, and chocolate. · Limit medicines that can cause fatigue. This includes tranquilizers and cold and allergy medicines. When should you call for help? Watch closely for changes in your health, and be sure to contact your doctor if:    · You have new symptoms such as fever or a rash.     · Your fatigue gets worse.     · You have been feeling down, depressed, or hopeless. Or you may have lost interest in things that you usually enjoy.     · You are not getting better as expected. Where can you learn more? Go to http://robbie-zehra.info/. Enter O188 in the search box to learn more about \"Fatigue: Care Instructions. \"  Current as of: November 20, 2017  Content Version: 11.7  © 1355-8704 [employer]. Care instructions adapted under license by [employer] (which disclaims liability or warranty for this information). If you have questions about a medical condition or this instruction, always ask your healthcare professional. [address] any warranty or liability for your use of this information.

## 2018-08-24 NOTE — ED NOTES
Patient presents to ED with complaints of weakness in all 4 extremities that has occurred daily x2 days around 12 noon each day. Patient states the symptoms last approximately 3 hours and go away. She states when the symptoms take place she is still able to function normally, she is just aware that \"something is different. \"     She reports the symptoms are not currently happening. Patient resting on edge of stretcher. No further complaints noted.

## 2018-08-24 NOTE — ED NOTES
JOVANNY Block reviewed discharge instructions with the patient. The patient verbalized understanding. Patient discharged home with stable vitals. Patient ambulatory out of ED with steady gait. No further complaints noted.

## 2018-08-30 LAB
ANTIBODY SCREEN, EXTERNAL: NEGATIVE
CHLAMYDIA, EXTERNAL: NEGATIVE
HBSAG, EXTERNAL: NEGATIVE
HIV, EXTERNAL: NON REACTIVE
N. GONORRHEA, EXTERNAL: NEGATIVE
RPR, EXTERNAL: NON REACTIVE
RUBELLA, EXTERNAL: NORMAL

## 2019-03-10 ENCOUNTER — HOSPITAL ENCOUNTER (OUTPATIENT)
Age: 24
Setting detail: OBSERVATION
Discharge: HOME | End: 2019-03-11
Attending: SPECIALIST | Admitting: SPECIALIST
Payer: COMMERCIAL

## 2019-03-10 ENCOUNTER — HOSPITAL ENCOUNTER (EMERGENCY)
Age: 24
Discharge: OTHER INSTITUTION | End: 2019-03-10
Attending: EMERGENCY MEDICINE
Payer: COMMERCIAL

## 2019-03-10 VITALS
HEART RATE: 81 BPM | TEMPERATURE: 98.5 F | OXYGEN SATURATION: 100 % | RESPIRATION RATE: 20 BRPM | SYSTOLIC BLOOD PRESSURE: 102 MMHG | BODY MASS INDEX: 25.59 KG/M2 | DIASTOLIC BLOOD PRESSURE: 64 MMHG | WEIGHT: 144.4 LBS | HEIGHT: 63 IN

## 2019-03-10 DIAGNOSIS — R10.9 ABDOMINAL PAIN DURING PREGNANCY IN THIRD TRIMESTER: Primary | ICD-10-CM

## 2019-03-10 DIAGNOSIS — O26.893 ABDOMINAL PAIN DURING PREGNANCY IN THIRD TRIMESTER: Primary | ICD-10-CM

## 2019-03-10 PROBLEM — O47.03 THREATENED PRETERM LABOR, THIRD TRIMESTER: Status: ACTIVE | Noted: 2019-03-10

## 2019-03-10 LAB
AMORPH CRY URNS QL MICRO: ABNORMAL
ANION GAP SERPL CALC-SCNC: 11 MMOL/L (ref 5–15)
APPEARANCE UR: CLEAR
BACTERIA URNS QL MICRO: NEGATIVE /HPF
BASOPHILS # BLD: 0 K/UL (ref 0–0.1)
BASOPHILS NFR BLD: 0 % (ref 0–1)
BILIRUB UR QL: NEGATIVE
BUN SERPL-MCNC: 8 MG/DL (ref 6–20)
BUN/CREAT SERPL: 12 (ref 12–20)
CALCIUM SERPL-MCNC: 8.5 MG/DL (ref 8.5–10.1)
CHLORIDE SERPL-SCNC: 106 MMOL/L (ref 97–108)
CO2 SERPL-SCNC: 22 MMOL/L (ref 21–32)
COLOR UR: ABNORMAL
COMMENT, HOLDF: NORMAL
CREAT SERPL-MCNC: 0.68 MG/DL (ref 0.55–1.02)
DIFFERENTIAL METHOD BLD: ABNORMAL
EOSINOPHIL # BLD: 0.1 K/UL (ref 0–0.4)
EOSINOPHIL NFR BLD: 2 % (ref 0–7)
EPITH CASTS URNS QL MICRO: ABNORMAL /LPF
ERYTHROCYTE [DISTWIDTH] IN BLOOD BY AUTOMATED COUNT: 12.6 % (ref 11.5–14.5)
GLUCOSE SERPL-MCNC: 71 MG/DL (ref 65–100)
GLUCOSE UR STRIP.AUTO-MCNC: NEGATIVE MG/DL
HCT VFR BLD AUTO: 31.1 % (ref 35–47)
HGB BLD-MCNC: 9.9 G/DL (ref 11.5–16)
HGB UR QL STRIP: NEGATIVE
KETONES UR QL STRIP.AUTO: NEGATIVE MG/DL
LEUKOCYTE ESTERASE UR QL STRIP.AUTO: ABNORMAL
LYMPHOCYTES # BLD: 2.3 K/UL (ref 0.8–3.5)
LYMPHOCYTES NFR BLD: 31 % (ref 12–49)
MCH RBC QN AUTO: 28.9 PG (ref 26–34)
MCHC RBC AUTO-ENTMCNC: 31.8 G/DL (ref 30–36.5)
MCV RBC AUTO: 90.9 FL (ref 80–99)
MONOCYTES # BLD: 0.6 K/UL (ref 0–1)
MONOCYTES NFR BLD: 8 % (ref 5–13)
NEUTS SEG # BLD: 4.5 K/UL (ref 1.8–8)
NEUTS SEG NFR BLD: 59 % (ref 32–75)
NITRITE UR QL STRIP.AUTO: NEGATIVE
PH UR STRIP: 7.5 [PH] (ref 5–8)
PLATELET # BLD AUTO: 204 K/UL (ref 150–400)
PMV BLD AUTO: 11.8 FL (ref 8.9–12.9)
POTASSIUM SERPL-SCNC: 3.7 MMOL/L (ref 3.5–5.1)
PROT UR STRIP-MCNC: NEGATIVE MG/DL
RBC # BLD AUTO: 3.42 M/UL (ref 3.8–5.2)
RBC #/AREA URNS HPF: ABNORMAL /HPF (ref 0–5)
SAMPLES BEING HELD,HOLD: NORMAL
SODIUM SERPL-SCNC: 139 MMOL/L (ref 136–145)
SP GR UR REFRACTOMETRY: 1.01 (ref 1–1.03)
UROBILINOGEN UR QL STRIP.AUTO: 0.2 EU/DL (ref 0.2–1)
WBC # BLD AUTO: 7.6 K/UL (ref 3.6–11)
WBC URNS QL MICRO: ABNORMAL /HPF (ref 0–4)

## 2019-03-10 PROCEDURE — 85025 COMPLETE CBC W/AUTO DIFF WBC: CPT

## 2019-03-10 PROCEDURE — 96361 HYDRATE IV INFUSION ADD-ON: CPT

## 2019-03-10 PROCEDURE — 59025 FETAL NON-STRESS TEST: CPT

## 2019-03-10 PROCEDURE — 74011250636 HC RX REV CODE- 250/636

## 2019-03-10 PROCEDURE — 81001 URINALYSIS AUTO W/SCOPE: CPT

## 2019-03-10 PROCEDURE — 36415 COLL VENOUS BLD VENIPUNCTURE: CPT

## 2019-03-10 PROCEDURE — 99218 HC RM OBSERVATION: CPT

## 2019-03-10 PROCEDURE — 74011250636 HC RX REV CODE- 250/636: Performed by: OBSTETRICS & GYNECOLOGY

## 2019-03-10 PROCEDURE — 99285 EMERGENCY DEPT VISIT HI MDM: CPT

## 2019-03-10 PROCEDURE — 83036 HEMOGLOBIN GLYCOSYLATED A1C: CPT

## 2019-03-10 PROCEDURE — 87081 CULTURE SCREEN ONLY: CPT

## 2019-03-10 PROCEDURE — 80048 BASIC METABOLIC PNL TOTAL CA: CPT

## 2019-03-10 PROCEDURE — 96365 THER/PROPH/DIAG IV INF INIT: CPT

## 2019-03-10 RX ORDER — ACETAMINOPHEN 325 MG/1
650 TABLET ORAL
Status: DISCONTINUED | OUTPATIENT
Start: 2019-03-10 | End: 2019-03-11 | Stop reason: HOSPADM

## 2019-03-10 RX ORDER — SODIUM CHLORIDE 0.9 % (FLUSH) 0.9 %
5-40 SYRINGE (ML) INJECTION EVERY 8 HOURS
Status: DISCONTINUED | OUTPATIENT
Start: 2019-03-10 | End: 2019-03-11 | Stop reason: HOSPADM

## 2019-03-10 RX ORDER — SODIUM CHLORIDE 0.9 % (FLUSH) 0.9 %
5-40 SYRINGE (ML) INJECTION AS NEEDED
Status: DISCONTINUED | OUTPATIENT
Start: 2019-03-10 | End: 2019-03-11 | Stop reason: HOSPADM

## 2019-03-10 RX ORDER — CALCIUM CARBONATE 200(500)MG
400 TABLET,CHEWABLE ORAL
Status: DISCONTINUED | OUTPATIENT
Start: 2019-03-10 | End: 2019-03-11 | Stop reason: HOSPADM

## 2019-03-10 RX ORDER — DIPHENHYDRAMINE HYDROCHLORIDE 50 MG/ML
INJECTION, SOLUTION INTRAMUSCULAR; INTRAVENOUS
Status: COMPLETED
Start: 2019-03-10 | End: 2019-03-10

## 2019-03-10 RX ORDER — DIPHENHYDRAMINE HYDROCHLORIDE 50 MG/ML
50 INJECTION, SOLUTION INTRAMUSCULAR; INTRAVENOUS ONCE
Status: ACTIVE | OUTPATIENT
Start: 2019-03-10 | End: 2019-03-11

## 2019-03-10 RX ORDER — ONDANSETRON 2 MG/ML
4 INJECTION INTRAMUSCULAR; INTRAVENOUS
Status: DISCONTINUED | OUTPATIENT
Start: 2019-03-10 | End: 2019-03-11 | Stop reason: HOSPADM

## 2019-03-10 RX ORDER — NALOXONE HYDROCHLORIDE 0.4 MG/ML
0.4 INJECTION, SOLUTION INTRAMUSCULAR; INTRAVENOUS; SUBCUTANEOUS AS NEEDED
Status: DISCONTINUED | OUTPATIENT
Start: 2019-03-10 | End: 2019-03-11 | Stop reason: HOSPADM

## 2019-03-10 RX ORDER — SODIUM CHLORIDE, SODIUM LACTATE, POTASSIUM CHLORIDE, CALCIUM CHLORIDE 600; 310; 30; 20 MG/100ML; MG/100ML; MG/100ML; MG/100ML
125 INJECTION, SOLUTION INTRAVENOUS CONTINUOUS
Status: DISCONTINUED | OUTPATIENT
Start: 2019-03-10 | End: 2019-03-11 | Stop reason: HOSPADM

## 2019-03-10 RX ORDER — LIDOCAINE HYDROCHLORIDE 10 MG/ML
20 INJECTION, SOLUTION EPIDURAL; INFILTRATION; INTRACAUDAL; PERINEURAL ONCE
Status: ACTIVE | OUTPATIENT
Start: 2019-03-10 | End: 2019-03-11

## 2019-03-10 RX ORDER — OXYCODONE HYDROCHLORIDE 5 MG/1
5 TABLET ORAL
Status: DISCONTINUED | OUTPATIENT
Start: 2019-03-10 | End: 2019-03-11 | Stop reason: HOSPADM

## 2019-03-10 RX ORDER — NALBUPHINE HYDROCHLORIDE 10 MG/ML
10 INJECTION, SOLUTION INTRAMUSCULAR; INTRAVENOUS; SUBCUTANEOUS
Status: DISCONTINUED | OUTPATIENT
Start: 2019-03-10 | End: 2019-03-11 | Stop reason: HOSPADM

## 2019-03-10 RX ORDER — BISACODYL 5 MG
5 TABLET, DELAYED RELEASE (ENTERIC COATED) ORAL DAILY PRN
Status: DISCONTINUED | OUTPATIENT
Start: 2019-03-10 | End: 2019-03-11 | Stop reason: HOSPADM

## 2019-03-10 RX ADMIN — DIPHENHYDRAMINE HYDROCHLORIDE 50 MG: 50 INJECTION, SOLUTION INTRAMUSCULAR; INTRAVENOUS at 18:26

## 2019-03-10 RX ADMIN — SODIUM CHLORIDE, SODIUM LACTATE, POTASSIUM CHLORIDE, AND CALCIUM CHLORIDE 125 ML/HR: 600; 310; 30; 20 INJECTION, SOLUTION INTRAVENOUS at 17:00

## 2019-03-10 RX ADMIN — VANCOMYCIN HYDROCHLORIDE 1000 MG: 1 INJECTION, POWDER, LYOPHILIZED, FOR SOLUTION INTRAVENOUS at 17:30

## 2019-03-10 NOTE — PROGRESS NOTES
1902-Bedside report from [address] [place] [address], care assumed at this time. White board updated at this time. 1910-Assessment done, patient states no new complaints. Patient states positive fetal movement, denies contractions, vaginal bleeding, leaking fluids, headache or blurred vision. 2300-Reassessment done, patient states no new complaints. 0710-Bedside report to RACHAEL Guerrero, care turned over at this time.

## 2019-03-10 NOTE — ED PROVIDER NOTES
HPI     Pt is a 22 y.o.  F at 28 wga here with c/o back and abdominal pain and lower pressure x 2 days. Sx are mainly left sided. Today pain has been every 20 minutes and she noticed blood with wiping after urinating this AM thus she came in. Her OB is Dr. Lonna Denver and she plans to deliver at [address] [address] [facility]. She denies any gush of fluid and is feeling the baby move. No dysuria. She had previous delivery at 39 wga 2/2 to oligo. Pt has no other complaints at this time. Past Medical History:   Diagnosis Date    Chlamydia     Endocrine disease     hypoglycemia    Gastrointestinal disorder     GERD    Hypokalemia     Ill-defined condition     kidney failure    Panic attacks     Psychiatric disorder     anxiety, panic disorder    Trichomonal infection        History reviewed. No pertinent surgical history. Family History:   Problem Relation Age of Onset    Migraines Mother        Social History     Socioeconomic History    Marital status: SINGLE     Spouse name: Not on file    Number of children: Not on file    Years of education: Not on file    Highest education level: Not on file   Social Needs    Financial resource strain: Not on file    Food insecurity - worry: Not on file    Food insecurity - inability: Not on file   Wedit needs - medical: Not on file   Wedit needs - non-medical: Not on file   Occupational History    Not on file   Tobacco Use    Smoking status: Never Smoker    Smokeless tobacco: Never Used   Substance and Sexual Activity    Alcohol use: No    Drug use: No    Sexual activity: Yes     Partners: Male     Birth control/protection: Condom   Other Topics Concern    Not on file   Social History Narrative    Not on file         ALLERGIES: Penicillins    Review of Systems   Gastrointestinal: Positive for abdominal pain. Genitourinary: Positive for vaginal bleeding. Musculoskeletal: Positive for back pain.        Vitals:    03/10/19 1035   BP: 122/72 Pulse: 87   Resp: 16   Temp: 98.8 °F (37.1 °C)   SpO2: 99%   Weight: 65.5 kg (144 lb 6.4 oz)   Height: 5' 3\" (1.6 m)            Physical Exam   Constitutional: She is oriented to person, place, and time. She appears well-developed and well-nourished. No distress. HENT:   Head: Normocephalic. Mouth/Throat: Oropharynx is clear and moist.   Eyes: Conjunctivae and EOM are normal. Pupils are equal, round, and reactive to light. Neck: Normal range of motion. Neck supple. No JVD present. Cardiovascular: Normal rate, regular rhythm, normal heart sounds and intact distal pulses. Pulmonary/Chest: Effort normal and breath sounds normal.   Abdominal: Bowel sounds are normal. She exhibits no distension. There is no tenderness. GRAVID;    Genitourinary: Vaginal discharge (scant white) found. Genitourinary Comments: Cervix open 1 cm/1 finger width   Musculoskeletal: Normal range of motion. She exhibits no edema, tenderness or deformity. Lymphadenopathy:     She has no cervical adenopathy. Neurological: She is alert and oriented to person, place, and time. No cranial nerve deficit or sensory deficit. Skin: Skin is warm and dry. Capillary refill takes less than 2 seconds. No rash noted. She is not diaphoretic. No erythema. Psychiatric: She has a normal mood and affect. Nursing note and vitals reviewed. MDM       Procedures      I have spoken with pt about the need for admisson/observation to r/o labor 2/2 to being 3rd trimester with pain and spotting. Labs and urine ordered to see if cause is dehydration or UTI. Will speak with Dr. Simone Lynch or partner via phone consult. Pt says she would like to deliver at [facility]. I have advised pt that is a great distance (~ 30 minutes or so) from here but will speak to Dr. Simone Lynch as well. 11:00  Spoke with midwife on call for Dr. Simone Noyola).   We discussed close hospitals to transfer the pt to with NICU capabilities since pt is  including [facility], [address] [facility], [facility] possibly [facility] although NICU capabilities are unknown. Advises admission to L & D to r/o labor due to the above. I have discussed the closer hospitals with the patient with L & D and NICU and advised pt of why they are being considered despite desire to deliver at [facility]. She and her partner decide on [facility]. Since pt is only 1 cm at this time without signs of active labor, I will work on transport to [facility] and make changes as needed if urgency changes. 11:08:  Transfer center contracted for OB hospitalist for transfer to observe and r/o active labor. 11:15  Transfer center contacted and pt accepted by Dr. Simin Alejo to [facility]. Martell Gosselin also notified. I have explained to pt prior to transfer that changes may occur in the EMS ride to [facility]. EMTALA signed. Pt stable prior to dc.     Artemio Tang MD

## 2019-03-10 NOTE — ED NOTES
Pt report given to Paramedic Rafael Villa with [facility] pt remains stable at time of transport to ED [facility]

## 2019-03-10 NOTE — ED TRIAGE NOTES
Patient brought to ED treatment area via wheelchair, accompanied by family, for complaint of \"I started with lower back pain and pressure in my hips for the past two days. I am 35 weeks along. \" Reports feeling normal fetal movement. Reports pain is \"wrapping around hips to front. \" Pain is more intense today, but there is not any consistency. Reports some pink when wiping today. Dr. Lonna Denver is OBGYN. Denies any complications with pregnancy to this point.

## 2019-03-10 NOTE — ROUTINE PROCESS
1250- Pt was brought in from [place] by ambulance for low abdominal pressure and pain. Pt is A1 GA 35/5  Monitors applied. 1315- Water pitcher given to pt, enc pt hydrate. 1620- Cx change to 2cms. Pt was admitted for observation. Transferred to room 3167.  1820- Vancomycin hung for GBS unknown at 1730  Pt allergic to PCN. 1820- Pt called that she felt itching and lips starting to get numb. MD notified. Order obtained for Benadryl 50 mg IV and given. Monitors applied. VSS. O2 sat 100%  1835- Pt report less itching and no tingling of lips. 1840- Pt is sleeping. NO distress noted. Family at the bedside. They made aware of pt's condition. 190- SBAR report to Megan Coon RN given. Care turned over at this time.

## 2019-03-10 NOTE — H&P
History & Physical    Name: Elvira Roldan MRN: [id number]  SSN: [id number]    YOB: 1995  Age: 22 y.o. Sex: female      Subjective:     Reason for Admission:  Pregnancy and abdominal pain    History of Present Illness: Elvira Roldan is a 22 y.o. [id number] with an estimated gestational age of 27w7d with Estimated Date of Delivery: 19. Patient presented to a free-standing ED facility c/o back and abdominal pain/pressure x2 days. She was found to be 1cm dilated and transferred here to [address] [place] [address] for further evaluation. The pain occurred every 20 minutes and was associated with pink spotting upon wiping this morning. She denies leakage of fluid and reports good fetal movement. Denies dysuria, urgency or frequency. No recent intercourse. Prenatal course complicated by: Insufficient PN care  H/o 36 weeks , induction for oligohydramnios      OB History    Para Term  AB Living   3 1   1 1 1   SAB TAB Ectopic Molar Multiple Live Births   1       0 1      # Outcome Date GA Lbr Marco/2nd Weight Sex Delivery Anes PTL Lv   3 Current            2  18 36w6d 02:48 / 00:12 2.425 kg M Vag-Spont CSE N YOLI   1 SAB                 Past Medical History:   Diagnosis Date    Chlamydia     Endocrine disease     hypoglycemia    Gastrointestinal disorder     GERD    Hypokalemia     Ill-defined condition     kidney failure    Panic attacks     Psychiatric disorder     anxiety, panic disorder    Trichomonal infection      No past surgical history on file.   Social History     Occupational History    Not on file   Tobacco Use    Smoking status: Never Smoker    Smokeless tobacco: Never Used   Substance and Sexual Activity    Alcohol use: No    Drug use: No    Sexual activity: Yes     Partners: Male     Birth control/protection: Condom     Family History   Problem Relation Age of Onset    Migraines Mother        Allergies   Allergen Reactions    Penicillins Hives     Prior to Admission medications    Medication Sig Start Date End Date Taking? Authorizing Provider   PNV No12-Iron-FA-DSS-OM-3 29 mg iron-1 mg -50 mg CPKD Take  by mouth. Yes Provider, Historical        Review of Systems   Constitutional: Negative. Appears comfortable   HENT: Negative. Eyes: Negative. Respiratory: Negative. Cardiovascular: Negative. Gastrointestinal: Positive for abdominal pain. Negative for abdominal distention, constipation, diarrhea, nausea and vomiting. Endocrine: Negative. Genitourinary: Negative. Negative for decreased urine volume, difficulty urinating, dyspareunia, dysuria, enuresis, flank pain, frequency, genital sores, hematuria, menstrual problem, pelvic pain, urgency, vaginal bleeding (spotting when wiping), vaginal discharge and vaginal pain. Musculoskeletal: Negative. Skin: Negative. Allergic/Immunologic: Negative. Neurological: Negative. Hematological: Negative. Psychiatric/Behavioral: Negative. All other systems reviewed and are negative. Objective:     Visit Vitals  /62   Pulse 65   Temp 98.7 °F (37.1 °C)   Resp 18   SpO2 99%       Physical Exam   Nursing note and vitals reviewed. Constitutional: She is oriented to person, place, and time. She appears well-developed and well-nourished. No distress. HENT:   Head: Normocephalic and atraumatic. Neck: Normal range of motion. Neck supple. Cardiovascular: Normal rate, regular rhythm and normal heart sounds. Exam reveals no gallop and no friction rub. No murmur heard. Pulmonary/Chest: Effort normal and breath sounds normal. No respiratory distress. She has no wheezes. She has no rales. She exhibits no tenderness. Abdominal: Soft. Bowel sounds are normal. She exhibits no distension and no mass. There is no tenderness. There is no rebound and no guarding. Genitourinary: Vagina normal. No labial fusion. There is no rash, tenderness, lesion or injury on the right labia.  There is no rash, tenderness, lesion or injury on the left labia. Uterus is enlarged (gravid). Uterus is not tender. No erythema, tenderness or bleeding in the vagina. No foreign body in the vagina. No signs of injury around the vagina. No vaginal discharge found. Musculoskeletal: Normal range of motion. She exhibits no edema, tenderness or deformity. Neurological: She is alert and oriented to person, place, and time. Skin: Skin is warm and dry. She is not diaphoretic. No erythema. Psychiatric: She has a normal mood and affect. Her behavior is normal.       Cervical Exam: 1.5/70/-3, no blood on glove after exam  Uterine Activity:Q3-4 minutes  Membranes: intact  Fetal Heart Rate: 130, category 1     TAUS: vertex, ROSALIO 8cm    Labs:   Recent Results (from the past 24 hour(s))   SAMPLES BEING HELD    Collection Time: 03/10/19 10:57 AM   Result Value Ref Range    SAMPLES BEING HELD STAT      COMMENT        Add-on orders for these samples will be processed based on acceptable specimen integrity and analyte stability, which may vary by analyte. CBC WITH AUTOMATED DIFF    Collection Time: 03/10/19 10:57 AM   Result Value Ref Range    WBC 7.6 3.6 - 11.0 K/uL    RBC 3.42 (L) 3.80 - 5.20 M/uL    HGB 9.9 (L) 11.5 - 16.0 g/dL    HCT 31.1 (L) 35.0 - 47.0 %    MCV 90.9 80.0 - 99.0 FL    MCH 28.9 26.0 - 34.0 PG    MCHC 31.8 30.0 - 36.5 g/dL    RDW 12.6 11.5 - 14.5 %    PLATELET 682 033 - 711 K/uL    MPV 11.8 8.9 - 12.9 FL    NEUTROPHILS 59 32 - 75 %    LYMPHOCYTES 31 12 - 49 %    MONOCYTES 8 5 - 13 %    EOSINOPHILS 2 0 - 7 %    BASOPHILS 0 0 - 1 %    ABS. NEUTROPHILS 4.5 1.8 - 8.0 K/UL    ABS. LYMPHOCYTES 2.3 0.8 - 3.5 K/UL    ABS. MONOCYTES 0.6 0.0 - 1.0 K/UL    ABS. EOSINOPHILS 0.1 0.0 - 0.4 K/UL    ABS.  BASOPHILS 0.0 0.0 - 0.1 K/UL    DF AUTOMATED     METABOLIC PANEL, BASIC    Collection Time: 03/10/19 10:57 AM   Result Value Ref Range    Sodium 139 136 - 145 mmol/L    Potassium 3.7 3.5 - 5.1 mmol/L    Chloride 106 97 - 108 mmol/L    CO2 22 21 - 32 mmol/L    Anion gap 11 5 - 15 mmol/L    Glucose 71 65 - 100 mg/dL    BUN 8 6 - 20 MG/DL    Creatinine 0.68 0.55 - 1.02 MG/DL    BUN/Creatinine ratio 12 12 - 20      GFR est AA >60 >60 ml/min/1.73m2    GFR est non-AA >60 >60 ml/min/1.73m2    Calcium 8.5 8.5 - 10.1 MG/DL   URINALYSIS W/ RFLX MICROSCOPIC    Collection Time: 03/10/19 10:57 AM   Result Value Ref Range    Color YELLOW/STRAW      Appearance CLEAR CLEAR      Specific gravity 1.010 1.003 - 1.030      pH (UA) 7.5 5.0 - 8.0      Protein NEGATIVE  NEG mg/dL    Glucose NEGATIVE  NEG mg/dL    Ketone NEGATIVE  NEG mg/dL    Bilirubin NEGATIVE  NEG      Blood NEGATIVE  NEG      Urobilinogen 0.2 0.2 - 1.0 EU/dL    Nitrites NEGATIVE  NEG      Leukocyte Esterase MODERATE (A) NEG     URINE MICROSCOPIC ONLY    Collection Time: 03/10/19 10:57 AM   Result Value Ref Range    WBC 0-4 0 - 4 /hpf    RBC 0-5 0 - 5 /hpf    Epithelial cells FEW FEW /lpf    Bacteria NEGATIVE  NEG /hpf    Amorphous Crystals 1+ (A) NEG       Assessment and Plan:     23 y/o  @ 35+5 weeks    Threatened PTL  - hydrate  - monitor ctx pattern  - assess for cervical change in 1-2 hours, sooner prn    Anemia  - recommend iron supplementation    Insufficient PN care  - encouraged office f/u ASAP if d/c home      Signed By:  Ana Gomez MD     March 10, 2019

## 2019-03-10 NOTE — PROGRESS NOTES
Patient feels slightly improved since arrival.     Visit Vitals  /75   Pulse 70   Temp 98.7 °F (37.1 °C)   Resp 18   SpO2 99%     Cvx: 60/-3  [place]: irregular to irritable  FHR: 135, category 1    A/P: 21 y/o  @ 35+5 weeks    Threatened PTL  - continue to observe  - move to L&D room  - admission labs  - IV hydration  - CEFM  - vertex confirmed    GBS unknown  - swab obtained  - Vancomycin prophylaxis     Insufficient PN care  - a1c    Anemia  - admission Hb 9.9

## 2019-03-11 VITALS
OXYGEN SATURATION: 100 % | DIASTOLIC BLOOD PRESSURE: 64 MMHG | TEMPERATURE: 98.5 F | RESPIRATION RATE: 18 BRPM | SYSTOLIC BLOOD PRESSURE: 116 MMHG | HEART RATE: 71 BPM

## 2019-03-11 LAB
EST. AVERAGE GLUCOSE BLD GHB EST-MCNC: ABNORMAL MG/DL
HBA1C MFR BLD: <3.5 % (ref 4.2–6.3)

## 2019-03-11 PROCEDURE — 99218 HC RM OBSERVATION: CPT

## 2019-03-11 PROCEDURE — 59025 FETAL NON-STRESS TEST: CPT

## 2019-03-11 NOTE — DISCHARGE INSTRUCTIONS
Weeks 34 to 36 of Your Pregnancy: Care Instructions  Your Care Instructions    By now, your baby and your belly have grown quite large. It is almost time to give birth. A full-term pregnancy can deliver between 37 and 42 weeks. Your baby's lungs are almost ready to breathe air. The bones in your baby's head are now firm enough to protect it, but soft enough to move down through the birth canal.  You may feel excited, happy, anxious, or scared. You may wonder how you will know if you are in labor or what to expect during labor. Try to be flexible in your expectations of the birth. Because each birth is different, there is no way to know exactly what childbirth will be like for you. This care sheet will help you know what to expect and how to prepare. This may make your childbirth easier. If you haven't already had the Tdap shot during this pregnancy, talk to your doctor about getting it. It will help protect your  against pertussis infection. In the 36th week, most women have a test for group B streptococcus (GBS). GBS is a common bacteria that can live in the vagina and rectum. It can make your baby sick after birth. If you test positive, you will get antibiotics during labor. The medicine will keep your baby from getting the bacteria. Follow-up care is a key part of your treatment and safety. Be sure to make and go to all appointments, and call your doctor if you are having problems. It's also a good idea to know your test results and keep a list of the medicines you take. How can you care for yourself at home? Learn about pain relief choices  · Pain is different for every woman. Talk with your doctor about your feelings about pain. · You can choose from several types of pain relief. These include medicine or breathing techniques, as well as comfort measures. You can use more than one option. · If you choose to have pain medicine during labor, talk to your doctor about your options.  Some medicines lower anxiety and help with some of the pain. Others make your lower body numb so that you won't feel pain. · Be sure to tell your doctor about your pain medicine choice before you start labor or very early in your labor. You may be able to change your mind as labor progresses. · Rarely, a woman is put to sleep by medicine given through a mask or an IV. Labor and delivery  · The first stage of labor has three parts: early, active, and transition. ? Most women have early labor at home. You can stay busy or rest, eat light snacks, drink clear fluids, and start counting contractions. ? When talking during a contraction gets hard, you may be moving to active labor. During active labor, you should head for the hospital if you are not there already. ? You are in active labor when contractions come every 3 to 4 minutes and last about 60 seconds. Your cervix is opening more rapidly. ? If your water breaks, contractions will come faster and stronger. ? During transition, your cervix is stretching, and contractions are coming more rapidly. ? You may want to push, but your cervix might not be ready. Your doctor will tell you when to push. · The second stage starts when your cervix is completely opened and you are ready to push. ? Contractions are very strong to push the baby down the birth canal.  ? You will feel the urge to push. You may feel like you need to have a bowel movement. ? You may be coached to push with contractions. These contractions will be very strong, but you will not have them as often. You can get a little rest between contractions. ? You may be emotional and irritable. You may not be aware of what is going on around you.  ? One last push, and your baby is born. · The third stage is when a few more contractions push out the placenta. This may take 30 minutes or less. · The fourth stage is the welcome recovery. You may feel overwhelmed with emotions and exhausted but alert.  This is a good time to start breastfeeding. Where can you learn more? Go to http://robbie-zehra.info/. Enter F624 in the search box to learn more about \"Weeks 34 to 36 of Your Pregnancy: Care Instructions. \"  Current as of: September 5, 2018  Content Version: 11.9  © 0965-1400 [employer]. Care instructions adapted under license by [employer] (which disclaims liability or warranty for this information).  If you have questions about a medical condition or this instruction, always ask your healthcare professional. [address] any warranty or liability for your use of this information.

## 2019-03-11 NOTE — PROGRESS NOTES
High Risk Obstetrics Progress Note    Name: Suzie Aguillon MRN: [id number]  SSN: [id number]    YOB: 1995  Age: 22 y.o. Sex: female      Subjective:      LOS: 0 days    Estimated Date of Delivery: 19   Gestational Age Today: 26w5d     Patient admitted for abdominal pain contractions. States she does not have abdominal pain   and contractions. Objective:     Vitals:  Blood pressure 112/55, pulse 73, temperature 98.5 °F (36.9 °C), resp. rate 18, SpO2 100 %, currently breastfeeding. Temp (24hrs), Av.6 °F (37 °C), Min:98.4 °F (36.9 °C), Max:98.8 °F (19.1 °C)    Systolic (47IXR), GUV:047 , Min:102 , WEO:503      Diastolic (08WKX), KKC:51, Min:55, Max:75       Intake and Output:          Physical Exam:  Deferred       Membranes:  Intact    Uterine Activity:  None    Fetal Heart Rate:  Baseline: 130 per minute        Labs:   Recent Results (from the past 36 hour(s))   SAMPLES BEING HELD    Collection Time: 03/10/19 10:57 AM   Result Value Ref Range    SAMPLES BEING HELD STAT      COMMENT        Add-on orders for these samples will be processed based on acceptable specimen integrity and analyte stability, which may vary by analyte. CBC WITH AUTOMATED DIFF    Collection Time: 03/10/19 10:57 AM   Result Value Ref Range    WBC 7.6 3.6 - 11.0 K/uL    RBC 3.42 (L) 3.80 - 5.20 M/uL    HGB 9.9 (L) 11.5 - 16.0 g/dL    HCT 31.1 (L) 35.0 - 47.0 %    MCV 90.9 80.0 - 99.0 FL    MCH 28.9 26.0 - 34.0 PG    MCHC 31.8 30.0 - 36.5 g/dL    RDW 12.6 11.5 - 14.5 %    PLATELET 200 654 - 507 K/uL    MPV 11.8 8.9 - 12.9 FL    NEUTROPHILS 59 32 - 75 %    LYMPHOCYTES 31 12 - 49 %    MONOCYTES 8 5 - 13 %    EOSINOPHILS 2 0 - 7 %    BASOPHILS 0 0 - 1 %    ABS. NEUTROPHILS 4.5 1.8 - 8.0 K/UL    ABS. LYMPHOCYTES 2.3 0.8 - 3.5 K/UL    ABS. MONOCYTES 0.6 0.0 - 1.0 K/UL    ABS. EOSINOPHILS 0.1 0.0 - 0.4 K/UL    ABS.  BASOPHILS 0.0 0.0 - 0.1 K/UL    DF AUTOMATED     METABOLIC PANEL, BASIC    Collection Time: 03/10/19 10:57 AM Result Value Ref Range    Sodium 139 136 - 145 mmol/L    Potassium 3.7 3.5 - 5.1 mmol/L    Chloride 106 97 - 108 mmol/L    CO2 22 21 - 32 mmol/L    Anion gap 11 5 - 15 mmol/L    Glucose 71 65 - 100 mg/dL    BUN 8 6 - 20 MG/DL    Creatinine 0.68 0.55 - 1.02 MG/DL    BUN/Creatinine ratio 12 12 - 20      GFR est AA >60 >60 ml/min/1.73m2    GFR est non-AA >60 >60 ml/min/1.73m2    Calcium 8.5 8.5 - 10.1 MG/DL   URINALYSIS W/ RFLX MICROSCOPIC    Collection Time: 03/10/19 10:57 AM   Result Value Ref Range    Color YELLOW/STRAW      Appearance CLEAR CLEAR      Specific gravity 1.010 1.003 - 1.030      pH (UA) 7.5 5.0 - 8.0      Protein NEGATIVE  NEG mg/dL    Glucose NEGATIVE  NEG mg/dL    Ketone NEGATIVE  NEG mg/dL    Bilirubin NEGATIVE  NEG      Blood NEGATIVE  NEG      Urobilinogen 0.2 0.2 - 1.0 EU/dL    Nitrites NEGATIVE  NEG      Leukocyte Esterase MODERATE (A) NEG     URINE MICROSCOPIC ONLY    Collection Time: 03/10/19 10:57 AM   Result Value Ref Range    WBC 0-4 0 - 4 /hpf    RBC 0-5 0 - 5 /hpf    Epithelial cells FEW FEW /lpf    Bacteria NEGATIVE  NEG /hpf    Amorphous Crystals 1+ (A) NEG   HEMOGLOBIN A1C WITH EAG    Collection Time: 03/10/19  4:47 PM   Result Value Ref Range    Hemoglobin A1c <3.5 (L) 4.2 - 6.3 %    Est. average glucose Cannot be calculated mg/dL       Assessment and Plan:       Active Problems:    Threatened  labor, third trimester (3/10/2019)       abdominal pain, contractions, feeling better will discharge home, follow uip in office 3 days    Signed By: William Mcmanus MD     2019

## 2019-03-11 NOTE — PROGRESS NOTES
26 Late Entry Bedside SBAR report received from BRADFORD Ridley RN on [facility] in room  3160 ,care assumed. 0750 EFM applied for NST, FHT's 135+ fetal movement. Dr Howard Reeve in to assess the patient and to discuss the POC. Pain scale rating 0/10, V/O R/B for general diet and may D/C home with F/U in 2-3 days. 0815  Having contractions every 5-8 minutes apart. Pt reports john abraham are not as strong as the were yesterday. 0850 Contractions spacing out, reactive NST.EFM stopped. Waiting for breakfast and her ride home. 0900 Discharge instructions reviewed with the patient, opportunity for questions, pt declined. 1130 D/C home ambulatory.

## 2019-03-13 LAB
BACTERIA SPEC CULT: NORMAL
SERVICE CMNT-IMP: NORMAL

## 2019-03-24 ENCOUNTER — ANESTHESIA EVENT (OUTPATIENT)
Dept: LABOR AND DELIVERY | Age: 24
DRG: 560 | End: 2019-03-24
Payer: COMMERCIAL

## 2019-03-24 ENCOUNTER — ANESTHESIA (OUTPATIENT)
Dept: LABOR AND DELIVERY | Age: 24
DRG: 560 | End: 2019-03-24
Payer: COMMERCIAL

## 2019-03-24 ENCOUNTER — HOSPITAL ENCOUNTER (INPATIENT)
Age: 24
LOS: 2 days | Discharge: HOME | DRG: 560 | End: 2019-03-26
Attending: EMERGENCY MEDICINE | Admitting: OBSTETRICS & GYNECOLOGY
Payer: COMMERCIAL

## 2019-03-24 DIAGNOSIS — O47.9 UTERINE CONTRACTIONS DURING PREGNANCY: Primary | ICD-10-CM

## 2019-03-24 PROBLEM — Z34.90 PREGNANCY: Status: ACTIVE | Noted: 2019-03-24

## 2019-03-24 LAB
A1 MICROGLOB PLACENTAL VAG QL: POSITIVE
BASOPHILS # BLD: 0 K/UL (ref 0–0.1)
BASOPHILS NFR BLD: 0 % (ref 0–1)
CONTROL LINE PRESENT?: NORMAL
DIFFERENTIAL METHOD BLD: ABNORMAL
EOSINOPHIL # BLD: 0.1 K/UL (ref 0–0.4)
EOSINOPHIL NFR BLD: 1 % (ref 0–7)
ERYTHROCYTE [DISTWIDTH] IN BLOOD BY AUTOMATED COUNT: 13.5 % (ref 11.5–14.5)
EXPIRATION DATE: NORMAL
HCT VFR BLD AUTO: 29.4 % (ref 35–47)
HGB BLD-MCNC: 9.5 G/DL (ref 11.5–16)
IMM GRANULOCYTES # BLD AUTO: 0.1 K/UL (ref 0–0.04)
IMM GRANULOCYTES NFR BLD AUTO: 1 % (ref 0–0.5)
INTERNAL NEGATIVE CONTROL: NORMAL
KIT LOT NO.: NORMAL
LYMPHOCYTES # BLD: 2.6 K/UL (ref 0.8–3.5)
LYMPHOCYTES NFR BLD: 30 % (ref 12–49)
MCH RBC QN AUTO: 28.8 PG (ref 26–34)
MCHC RBC AUTO-ENTMCNC: 32.3 G/DL (ref 30–36.5)
MCV RBC AUTO: 89.1 FL (ref 80–99)
MONOCYTES # BLD: 0.8 K/UL (ref 0–1)
MONOCYTES NFR BLD: 10 % (ref 5–13)
NEUTS SEG # BLD: 4.9 K/UL (ref 1.8–8)
NEUTS SEG NFR BLD: 58 % (ref 32–75)
NRBC # BLD: 0 K/UL (ref 0–0.01)
NRBC BLD-RTO: 0 PER 100 WBC
PLATELET # BLD AUTO: 213 K/UL (ref 150–400)
PMV BLD AUTO: 11.7 FL (ref 8.9–12.9)
RBC # BLD AUTO: 3.3 M/UL (ref 3.8–5.2)
WBC # BLD AUTO: 8.5 K/UL (ref 3.6–11)

## 2019-03-24 PROCEDURE — 65410000002 HC RM PRIVATE OB

## 2019-03-24 PROCEDURE — 4A0HXCZ MEASUREMENT OF PRODUCTS OF CONCEPTION, CARDIAC RATE, EXTERNAL APPROACH: ICD-10-PCS | Performed by: OBSTETRICS & GYNECOLOGY

## 2019-03-24 PROCEDURE — 77030034850

## 2019-03-24 PROCEDURE — A4300 CATH IMPL VASC ACCESS PORTAL: HCPCS

## 2019-03-24 PROCEDURE — 84112 EVAL AMNIOTIC FLUID PROTEIN: CPT | Performed by: OBSTETRICS & GYNECOLOGY

## 2019-03-24 PROCEDURE — 77030014125 HC TY EPDRL BBMI -B: Performed by: ANESTHESIOLOGY

## 2019-03-24 PROCEDURE — 74011250636 HC RX REV CODE- 250/636: Performed by: OBSTETRICS & GYNECOLOGY

## 2019-03-24 PROCEDURE — 75410000000 HC DELIVERY VAGINAL/SINGLE

## 2019-03-24 PROCEDURE — 74011000250 HC RX REV CODE- 250: Performed by: ANESTHESIOLOGY

## 2019-03-24 PROCEDURE — 36415 COLL VENOUS BLD VENIPUNCTURE: CPT

## 2019-03-24 PROCEDURE — 85025 COMPLETE CBC W/AUTO DIFF WBC: CPT

## 2019-03-24 PROCEDURE — 00HU33Z INSERTION OF INFUSION DEVICE INTO SPINAL CANAL, PERCUTANEOUS APPROACH: ICD-10-PCS | Performed by: ANESTHESIOLOGY

## 2019-03-24 PROCEDURE — 75410000002 HC LABOR FEE PER 1 HR

## 2019-03-24 PROCEDURE — 74011000250 HC RX REV CODE- 250

## 2019-03-24 PROCEDURE — 75410000003 HC RECOV DEL/VAG/CSECN EA 0.5 HR

## 2019-03-24 PROCEDURE — 76060000078 HC EPIDURAL ANESTHESIA

## 2019-03-24 PROCEDURE — 75810000275 HC EMERGENCY DEPT VISIT NO LEVEL OF CARE

## 2019-03-24 PROCEDURE — 3E0R3BZ INTRODUCTION OF ANESTHETIC AGENT INTO SPINAL CANAL, PERCUTANEOUS APPROACH: ICD-10-PCS | Performed by: ANESTHESIOLOGY

## 2019-03-24 PROCEDURE — 99283 EMERGENCY DEPT VISIT LOW MDM: CPT

## 2019-03-24 PROCEDURE — 74011250637 HC RX REV CODE- 250/637: Performed by: OBSTETRICS & GYNECOLOGY

## 2019-03-24 PROCEDURE — 99285 EMERGENCY DEPT VISIT HI MDM: CPT

## 2019-03-24 RX ORDER — OXYCODONE AND ACETAMINOPHEN 5; 325 MG/1; MG/1
1 TABLET ORAL
Status: DISCONTINUED | OUTPATIENT
Start: 2019-03-24 | End: 2019-03-26 | Stop reason: HOSPADM

## 2019-03-24 RX ORDER — BUPIVACAINE HYDROCHLORIDE AND EPINEPHRINE 2.5; 5 MG/ML; UG/ML
INJECTION, SOLUTION EPIDURAL; INFILTRATION; INTRACAUDAL; PERINEURAL AS NEEDED
Status: DISCONTINUED | OUTPATIENT
Start: 2019-03-24 | End: 2019-03-24 | Stop reason: HOSPADM

## 2019-03-24 RX ORDER — ZOLPIDEM TARTRATE 5 MG/1
5 TABLET ORAL
Status: DISCONTINUED | OUTPATIENT
Start: 2019-03-24 | End: 2019-03-26 | Stop reason: HOSPADM

## 2019-03-24 RX ORDER — DIPHENHYDRAMINE HCL 25 MG
25 CAPSULE ORAL
Status: DISCONTINUED | OUTPATIENT
Start: 2019-03-24 | End: 2019-03-26 | Stop reason: HOSPADM

## 2019-03-24 RX ORDER — OXYTOCIN/0.9 % SODIUM CHLORIDE 30/500 ML
0-25 PLASTIC BAG, INJECTION (ML) INTRAVENOUS
Status: DISCONTINUED | OUTPATIENT
Start: 2019-03-24 | End: 2019-03-24 | Stop reason: HOSPADM

## 2019-03-24 RX ORDER — FENTANYL/BUPIVACAINE/NS/PF 2-1250MCG
1-16 PREFILLED PUMP RESERVOIR EPIDURAL CONTINUOUS
Status: DISCONTINUED | OUTPATIENT
Start: 2019-03-24 | End: 2019-03-24 | Stop reason: HOSPADM

## 2019-03-24 RX ORDER — BUPIVACAINE HYDROCHLORIDE AND EPINEPHRINE 5; 5 MG/ML; UG/ML
INJECTION, SOLUTION EPIDURAL; INTRACAUDAL; PERINEURAL
Status: DISCONTINUED
Start: 2019-03-24 | End: 2019-03-24 | Stop reason: WASHOUT

## 2019-03-24 RX ORDER — SODIUM CHLORIDE 0.9 % (FLUSH) 0.9 %
5-40 SYRINGE (ML) INJECTION EVERY 8 HOURS
Status: DISCONTINUED | OUTPATIENT
Start: 2019-03-24 | End: 2019-03-26 | Stop reason: HOSPADM

## 2019-03-24 RX ORDER — SODIUM CHLORIDE, SODIUM LACTATE, POTASSIUM CHLORIDE, CALCIUM CHLORIDE 600; 310; 30; 20 MG/100ML; MG/100ML; MG/100ML; MG/100ML
125 INJECTION, SOLUTION INTRAVENOUS CONTINUOUS
Status: DISCONTINUED | OUTPATIENT
Start: 2019-03-24 | End: 2019-03-26 | Stop reason: HOSPADM

## 2019-03-24 RX ORDER — ONDANSETRON 2 MG/ML
4 INJECTION INTRAMUSCULAR; INTRAVENOUS
Status: DISCONTINUED | OUTPATIENT
Start: 2019-03-24 | End: 2019-03-26 | Stop reason: HOSPADM

## 2019-03-24 RX ORDER — SODIUM CHLORIDE 0.9 % (FLUSH) 0.9 %
5-40 SYRINGE (ML) INJECTION EVERY 8 HOURS
Status: DISCONTINUED | OUTPATIENT
Start: 2019-03-24 | End: 2019-03-24 | Stop reason: HOSPADM

## 2019-03-24 RX ORDER — BUPIVACAINE HYDROCHLORIDE AND EPINEPHRINE 2.5; 5 MG/ML; UG/ML
INJECTION, SOLUTION EPIDURAL; INFILTRATION; INTRACAUDAL; PERINEURAL
Status: COMPLETED
Start: 2019-03-24 | End: 2019-03-24

## 2019-03-24 RX ORDER — SWAB
1 SWAB, NON-MEDICATED MISCELLANEOUS DAILY
Status: DISCONTINUED | OUTPATIENT
Start: 2019-03-25 | End: 2019-03-26 | Stop reason: HOSPADM

## 2019-03-24 RX ORDER — NALOXONE HYDROCHLORIDE 0.4 MG/ML
0.4 INJECTION, SOLUTION INTRAMUSCULAR; INTRAVENOUS; SUBCUTANEOUS AS NEEDED
Status: DISCONTINUED | OUTPATIENT
Start: 2019-03-24 | End: 2019-03-24

## 2019-03-24 RX ORDER — SIMETHICONE 80 MG
80 TABLET,CHEWABLE ORAL
Status: DISCONTINUED | OUTPATIENT
Start: 2019-03-24 | End: 2019-03-26 | Stop reason: HOSPADM

## 2019-03-24 RX ORDER — IBUPROFEN 400 MG/1
800 TABLET ORAL EVERY 8 HOURS
Status: DISCONTINUED | OUTPATIENT
Start: 2019-03-24 | End: 2019-03-26 | Stop reason: HOSPADM

## 2019-03-24 RX ORDER — NALOXONE HYDROCHLORIDE 0.4 MG/ML
0.4 INJECTION, SOLUTION INTRAMUSCULAR; INTRAVENOUS; SUBCUTANEOUS AS NEEDED
Status: DISCONTINUED | OUTPATIENT
Start: 2019-03-24 | End: 2019-03-26 | Stop reason: HOSPADM

## 2019-03-24 RX ORDER — NALBUPHINE HYDROCHLORIDE 10 MG/ML
10 INJECTION, SOLUTION INTRAMUSCULAR; INTRAVENOUS; SUBCUTANEOUS
Status: DISCONTINUED | OUTPATIENT
Start: 2019-03-24 | End: 2019-03-24 | Stop reason: HOSPADM

## 2019-03-24 RX ORDER — DOCUSATE SODIUM 100 MG/1
100 CAPSULE, LIQUID FILLED ORAL
Status: DISCONTINUED | OUTPATIENT
Start: 2019-03-24 | End: 2019-03-26 | Stop reason: HOSPADM

## 2019-03-24 RX ORDER — ACETAMINOPHEN 325 MG/1
650 TABLET ORAL
Status: DISCONTINUED | OUTPATIENT
Start: 2019-03-24 | End: 2019-03-26 | Stop reason: HOSPADM

## 2019-03-24 RX ORDER — FENTANYL/BUPIVACAINE/NS/PF 2-1250MCG
PREFILLED PUMP RESERVOIR EPIDURAL
Status: DISPENSED
Start: 2019-03-24 | End: 2019-03-25

## 2019-03-24 RX ORDER — SODIUM CHLORIDE 0.9 % (FLUSH) 0.9 %
5-40 SYRINGE (ML) INJECTION AS NEEDED
Status: DISCONTINUED | OUTPATIENT
Start: 2019-03-24 | End: 2019-03-26 | Stop reason: HOSPADM

## 2019-03-24 RX ORDER — SODIUM CHLORIDE 0.9 % (FLUSH) 0.9 %
5-40 SYRINGE (ML) INJECTION AS NEEDED
Status: DISCONTINUED | OUTPATIENT
Start: 2019-03-24 | End: 2019-03-24 | Stop reason: HOSPADM

## 2019-03-24 RX ORDER — ONDANSETRON 2 MG/ML
4 INJECTION INTRAMUSCULAR; INTRAVENOUS
Status: DISCONTINUED | OUTPATIENT
Start: 2019-03-24 | End: 2019-03-24 | Stop reason: SDUPTHER

## 2019-03-24 RX ORDER — HYDROCORTISONE ACETATE PRAMOXINE HCL 2.5; 1 G/100G; G/100G
CREAM TOPICAL AS NEEDED
Status: DISCONTINUED | OUTPATIENT
Start: 2019-03-24 | End: 2019-03-26 | Stop reason: HOSPADM

## 2019-03-24 RX ORDER — OXYTOCIN/RINGER'S LACTATE 20/1000 ML
125-500 PLASTIC BAG, INJECTION (ML) INTRAVENOUS ONCE
Status: ACTIVE | OUTPATIENT
Start: 2019-03-24 | End: 2019-03-25

## 2019-03-24 RX ADMIN — BUPIVACAINE HYDROCHLORIDE AND EPINEPHRINE 5 ML: 2.5; 5 INJECTION, SOLUTION EPIDURAL; INFILTRATION; INTRACAUDAL; PERINEURAL at 13:16

## 2019-03-24 RX ADMIN — OXYTOCIN-SODIUM CHLORIDE 0.9% IV SOLN 30 UNIT/500ML 1 MILLI-UNITS/MIN: 30-0.9/5 SOLUTION at 09:17

## 2019-03-24 RX ADMIN — SODIUM CHLORIDE, SODIUM LACTATE, POTASSIUM CHLORIDE, AND CALCIUM CHLORIDE 125 ML/HR: 600; 310; 30; 20 INJECTION, SOLUTION INTRAVENOUS at 09:13

## 2019-03-24 RX ADMIN — Medication 12 ML/HR: at 13:23

## 2019-03-24 RX ADMIN — SODIUM CHLORIDE, SODIUM LACTATE, POTASSIUM CHLORIDE, AND CALCIUM CHLORIDE 999 ML/HR: 600; 310; 30; 20 INJECTION, SOLUTION INTRAVENOUS at 12:33

## 2019-03-24 RX ADMIN — IBUPROFEN 800 MG: 400 TABLET ORAL at 18:06

## 2019-03-24 RX ADMIN — BUPIVACAINE HYDROCHLORIDE AND EPINEPHRINE 0.5 ML: 2.5; 5 INJECTION, SOLUTION EPIDURAL; INFILTRATION; INTRACAUDAL; PERINEURAL at 13:10

## 2019-03-24 RX ADMIN — BUPIVACAINE HYDROCHLORIDE AND EPINEPHRINE 5 ML: 2.5; 5 INJECTION, SOLUTION EPIDURAL; INFILTRATION; INTRACAUDAL; PERINEURAL at 13:11

## 2019-03-24 NOTE — PROGRESS NOTES
TRANSFER - OUT REPORT: 
 
Verbal report given to CAMERON Briseno RN on Alexia Moise  being transferred to [facility] for routine progression of care Report consisted of patients Situation, Background, Assessment and  
Recommendations(SBAR). Information from the following report(s) SBAR, Intake/Output and MAR was reviewed with the receiving nurse. Lines:  
Peripheral IV 03/24/19 Right Forearm (Active) Site Assessment Clean, dry, & intact 3/24/2019  5:24 AM  
Phlebitis Assessment 0 3/24/2019  5:24 AM  
Infiltration Assessment 0 3/24/2019  5:24 AM  
Dressing Status Clean, dry, & intact 3/24/2019  5:24 AM  
Dressing Type Tape;Transparent 3/24/2019  5:24 AM  
Hub Color/Line Status Green;Flushed 3/24/2019  5:24 AM  
Action Taken Blood drawn 3/24/2019  5:24 AM  
  
 
Opportunity for questions and clarification was provided.

## 2019-03-24 NOTE — PROGRESS NOTES
Primary Nurse Maria Luisa Moran RN and Marcy Edwards RN performed a dual skin assessment on this patient No impairment noted Ari score is 23

## 2019-03-24 NOTE — ANESTHESIA PROCEDURE NOTES
CSE Block Performed by: Isidro Sanchez MD 
Authorized by: Isidro Sanchez MD  
 
Pre-Procedure OB Combined Spinal-Epidural Note Risk and benefits were discussed with the patient and plans are to proceed. Patient was placed in the seated position. The back was prepped at the lumbar region with Duraprep. 3 ml 0.25% bupivacaine with 1:200K epinephrine  was used as local at L3 - L4. A 17 Tuohy needle was passed x 1 attempt(s) at the above stated level. Loss of resistance at 4 cm. A 25 g pencil point spinal needle was placed through the Touhy until CSF was obtained. 0.5 mL 0.25% bupivacaine with 1:200K epinephrine was deposited into the CSF. A 19 g spring type epidural catheter was placed through the Touhy and secured at 9 cm at the skin. Test dose with 5 mL 0.25% bupivacaine with 1:200 epinephrine was negative. No paresthesia. Beatrice Arguelles MD 
3/24/2019

## 2019-03-24 NOTE — ED PROVIDER NOTES
EMERGENCY DEPARTMENT HISTORY AND PHYSICAL EXAM 
 
 
Date: 3/24/2019 Patient Name: Siobhan Pike 
 
History of Presenting Illness Chief Complaint Patient presents with  Laboring 2nd preg.; 38 wks; water broke at 0340 History Provided By: Patient and Patient's  HPI: Siobhan Pike, 22 y.o.  female at 7 weeks presents ambulatory to the ED with cc of labor. Patient awoke from sleep at 3:40 when her water broke. After a few minutes she developed lower abdominal pressure and cramping. Her OB is Dr Tavarez but she has yet to call his office. She denies any problems with this pregnancy as well as any current HA, vision changes, CP, SOB and edema. There are no other complaints, changes, or physical findings at this time. PCP: Ismael Tim MD 
 
No current facility-administered medications on file prior to encounter. Current Outpatient Medications on File Prior to Encounter Medication Sig Dispense Refill  PNV No12-Iron-FA-DSS-OM-3 29 mg iron-1 mg -50 mg CPKD Take  by mouth. Past History Past Medical History: 
Past Medical History:  
Diagnosis Date  Chlamydia  Endocrine disease   
 hypoglycemia  Gastrointestinal disorder GERD  Hypokalemia  Ill-defined condition   
 kidney failure  Panic attacks  Psychiatric disorder   
 anxiety, panic disorder  Trichomonal infection Past Surgical History: No past surgical history on file. Family History: 
Family History Problem Relation Age of Onset  Migraines Mother Social History: 
Social History Tobacco Use  Smoking status: Never Smoker  Smokeless tobacco: Never Used Substance Use Topics  Alcohol use: No  
 Drug use: No  
 
 
Allergies: Allergies Allergen Reactions  Penicillins Hives  Vancomycin Itching and Other (comments) Lips went numb Review of Systems Review of Systems Constitutional: Negative for activity change, appetite change, chills and fever. HENT: Negative for congestion. Eyes: Negative for photophobia and visual disturbance. Respiratory: Negative for cough and shortness of breath. Cardiovascular: Negative for chest pain. Gastrointestinal: Negative for diarrhea, nausea and vomiting. Genitourinary: Negative for dysuria. Musculoskeletal: Negative for back pain. Skin: Negative for rash. Neurological: Negative for headaches. Physical Exam  
Physical Exam  
Constitutional: She is oriented to person, place, and time. She appears well-developed and well-nourished. This is a calm young female in mild distress HENT:  
Head: Normocephalic and atraumatic. Mouth/Throat: Oropharynx is clear and moist.  
Eyes: Pupils are equal, round, and reactive to light. Conjunctivae and EOM are normal. Right eye exhibits no discharge. Left eye exhibits no discharge. Neck: Normal range of motion. Neck supple. Cardiovascular: Normal rate, regular rhythm and normal heart sounds. No murmur heard. Pulmonary/Chest: Effort normal and breath sounds normal. No respiratory distress. She has no wheezes. She has no rales. Abdominal: Soft. Bowel sounds are normal. She exhibits no distension. There is no tenderness. Fully gravid uterus with lower abdominal contraction noted during evaluation Musculoskeletal: Normal range of motion. She exhibits no edema. Neurological: She is alert and oriented to person, place, and time. No cranial nerve deficit. She exhibits normal muscle tone. Skin: Skin is warm and dry. No rash noted. She is not diaphoretic. Nursing note and vitals reviewed. Diagnostic Study Results Labs - No results found for this or any previous visit (from the past 12 hour(s)). Radiologic Studies - No orders to display CT Results  (Last 48 hours) None CXR Results  (Last 48 hours) None Medical Decision Making I am the first provider for this patient. I reviewed the vital signs, available nursing notes, past medical history, past surgical history, family history and social history. Vital Signs-Reviewed the patient's vital signs. Patient Vitals for the past 12 hrs: 
 Pulse BP SpO2  
19 0412 81 121/77 100 % Records Reviewed: Nursing Notes Provider Notes (Medical Decision Making):  
 female presenting at full term in labor. Patient does not appear to be in active labor or at risk for imminent delivery thus will transfer to the [facility] floor. ED Course:  
Initial assessment performed. The patients presenting problems have been discussed, and they are in agreement with the care plan formulated and outlined with them. I have encouraged them to ask questions as they arise throughout their visit. Critical Care Time:  
0 Disposition: To labor and delivery PLAN: 
1. To L and D Diagnosis Clinical Impression: 1. Uterine contractions during pregnancy

## 2019-03-24 NOTE — L&D DELIVERY NOTE
Delivery Summary    Patient: Siobhan Pike MRN: [id number]  SSN: [id number]    YOB: 1995  Age: 22 y.o. Sex: female       Information for the patient's :  Sebas Carpenter [[id number]]       Labor Events:    Labor: No   Rupture Date: 3/24/2019   Rupture Time: 3:40 AM   Rupture Type SROM   Amniotic Fluid Volume: Moderate    Amniotic Fluid Description: Clear None   Induction: None       Augmentation: Oxytocin   Labor Events: None     Cervical Ripening:     None     Delivery Events:  Episiotomy: None   Laceration(s): None     Repaired: None    Number of Repair Packets:     Suture Type and Size:       Estimated Blood Loss (ml): 100ml       Delivery Date: 3/24/2019    Delivery Time: 3:53 PM  Delivery Type: Vaginal, Spontaneous  Sex:  Female     Gestational Age: 37w6d   Delivery Clinician:  Stanford Fritz  Living Status: Living   Delivery Location: L&D            APGARS  One minute Five minutes Ten minutes   Skin color: 0   1        Heart rate: 2   2        Grimace: 2   2        Muscle tone: 2   2        Breathin   2        Totals: 8   9            Presentation: Vertex    Position: Middle Occiput Anterior  Resuscitation Method:  Tactile Stimulation;Suctioning-bulb     Meconium Stained: None      Cord Information: 3 Vessels  Complications: None  Cord around:    Delayed cord clamping?  Yes  Cord clamped date/time:   Disposition of Cord Blood:      Blood Gases Sent?: No    Placenta:  Date/Time: 3/24/2019  3:57 PM  Removal: Spontaneous      Appearance: Normal     [place] Measurements:  Birth Weight: 3.285 kg      Birth Length: 50.8 cm      Head Circumference: 34.3 cm      Chest Circumference: 33 cm     Abdominal Girth: 33 cm    Other Providers:   ESTRELLA Whatley;CRAIG WEBBER;;;;;;;;;Gege COLLAZO Octaviano Chiles, Obstetrician;Primary Nurse;Primary [place] Nurse;Nicu Nurse;Neonatologist;Anesthesiologist;Crna;Nurse Practitioner;Midwife;Nursery Nurse;Charge Nurse;Staff Nurse;Tech       Group B Strep: Neg    Pt progressed well to C/C/+2 and pushed x 3 contractions with  over intact perineum of liveborn female infant. Head delivered direct OA. No nuchal cord. Shoulders delivered spontaneously with body following easily. Infant placed on maternal abdomen. Delayed cord clamping x 1 min. Cord clamped x 2 and cut by family member. Placenta delivered intact with 3v cord. Proph pitocin hung. No perineal, vaginal or cervical lacerations. Fundus firm at U.  ml. Mother and baby bonding in LDR.

## 2019-03-24 NOTE — PROGRESS NOTES
Camp of care note. Assuming care of this 23 yo  at 37+5 admitted this AM following SROM at 0340 (clear). Uncomplicated PNC. GBS neg. Rh pos.  RI. 
 
S:  Pt not yet uncomfortable with ctx's. Desires an epidural at some point. O:  106/56, 70, 15, 98.1, 100% RA Gen:  WDWN, NAD Cx:  Deferred (was 3/70/-2 at admit by Dr. Tino Chaidez) FHT:  125 baseline, moderate variability, + accels, no decels, Cat I/Reactive [place]:  Irregular ctx's A:  23 yo  at 37+5 s/p SROM admitted for augmentation of labor. GBS neg. Rh pos.  RI. 
 
P:  1. Pitocin just started 2. Epidural prn 3. Monitor/manage labor 4. Anticipate vaginal delivery.  for standard fetal/maternal indications.

## 2019-03-24 NOTE — PROGRESS NOTES
Report received from YORDY Gomez RN. Care assumed at this time. 2786 Dr. Rutherford in room to meet patient. Pt denies pain 1200 Pt requesting epidural. Bolus has been started. 36 Dr. Rocío Woodall called for epidural 
 
1300 Dr. Rocío Woodall in room for epidural, Pt in Hf position. [address] down 1340 Johns being placed, pt to right side 1440 SVE by Dr. Rutherford. Pt 8 cm. 1544 Pt complete 1547 Johns removed 1548 Bed broken down, pt begin to push 46 Birth of viable baby girl 1710 Pt straight cath'd 600 cc removed. Aracely-care completed, pads changed. Pt states she still feels numb.

## 2019-03-24 NOTE — H&P
History & Physical 
 
Name: Huey Cooney MRN: [id number]  SSN: [id number] YOB: 1995  Age: 22 y.o. Sex: female Subjective:  
 
Estimated Date of Delivery: 19 OB History  Para Term  AB Living 3 1   1 1 1 SAB TAB Ectopic Molar Multiple Live Births 1       0 1 # Outcome Date GA Lbr Marco/2nd Weight Sex Delivery Anes PTL Lv  
3 Current 2  18 36w6d 02:48 / 00:12 2.425 kg M Vag-Spont CSE N YOLI  
1 SAB Ms. Bill Morris is a 22yo [id number] admitted with pregnancy at 37w5d for SROM. Prenatal course was normal. Please see prenatal records for details. Patient reports SROM at 0340. She denies any significant contractions. Amnisure done which was positive. She denies any issues with this pregnancy. She has a 17mth old son. Pt reports GBS is negative. Past Medical History:  
Diagnosis Date  Chlamydia  Endocrine disease   
 hypoglycemia  Gastrointestinal disorder GERD  Hypokalemia  Ill-defined condition   
 kidney failure  Kidney disease \"kidney failure a few years ago\"  Panic attacks  Psychiatric disorder   
 anxiety, panic disorder  Trichomonal infection No past surgical history on file. Social History Occupational History  Not on file Tobacco Use  Smoking status: Never Smoker  Smokeless tobacco: Never Used Substance and Sexual Activity  Alcohol use: No  
 Drug use: No  
 Sexual activity: Yes  
  Partners: Male Birth control/protection: Condom Family History Problem Relation Age of Onset  Migraines Mother Allergies Allergen Reactions  Penicillins Hives  Vancomycin Itching and Other (comments) Lips went numb Prior to Admission medications Medication Sig Start Date End Date Taking? Authorizing Provider PNV No12-Iron-FA-DSS-OM-3 29 mg iron-1 mg -50 mg CPKD Take  by mouth.     Provider, Historical  
  
 
 Review of Systems: A comprehensive review of systems was negative except for that written in the HPI. Objective:  
 
Vitals: 
Vitals:  
 03/24/19 5143 03/24/19 9313 03/24/19 8919 BP: 121/77  122/72 Pulse: 81  80 Resp:   16 Temp:   98.5 °F (36.9 °C) SpO2: 100%  98% Weight:  66.7 kg (147 lb) Height:  5' 3\" (1.6 m) Physical Exam: 
General: no acute distress Heart: S1S2 present Lung: normal respiratory effort Abdomen: soft, gravid, nontender Cervical Exam: 3/70/-2 Lower Extremities: no calf tenderness Membranes:  Spontaneous Rupture of Membranes; Amniotic Fluid: clear fluid Fetal Heart Rate: Reactive; 120's moderate variability, +accels, no decels [place]: irregular Prenatal Labs:  
O positive Rubella Immune HBsAg negative HIV nonreactive Pap neg GC/chlamydia/trich negative Quad screen negative Lab Results Component Value Date/Time  
 Rubella, External Immune 31.40 08/30/2018 HBsAg, External Negative 08/30/2018 HIV, External Non Reactive 08/30/2018 RPR, External Non Reactive 08/30/2018 Gonorrhea, External Negative 08/30/2018 Chlamydia, External Negative 08/30/2018 ABO,Rh O POSITIVE 07/03/2017 Assessment/Plan: Active Problems: 
  Pregnancy (3/24/2019) 24yo [id number] @ 37w5d with PROM 
-admit to L&D 
-CBC, sample 
-clears 
-may ambulate 
-iv pain meds/epidural if desired 
-if no labor by 9am, will start pitocin 
-presumed GBS (per pt report) 
-reactive FHT 
-all questions answered Signed By:  Steve Stringer DO March 24, 2019

## 2019-03-24 NOTE — ANESTHESIA PREPROCEDURE EVALUATION
Anesthetic History No history of anesthetic complications Review of Systems / Medical History Patient summary reviewed, nursing notes reviewed and pertinent labs reviewed Pulmonary Within defined limits Neuro/Psych Psychiatric history Cardiovascular Within defined limits Exercise tolerance: >4 METS 
  
GI/Hepatic/Renal 
Within defined limits Endo/Other Within defined limits Other Findings Comments: IUP Physical Exam 
 
Airway Mallampati: II 
TM Distance: 4 - 6 cm Neck ROM: normal range of motion Mouth opening: Normal 
 
 Cardiovascular Dental 
No notable dental hx Pulmonary Abdominal 
GI exam deferred Other Findings Anesthetic Plan ASA: 2 Anesthesia type: CSE Anesthetic plan and risks discussed with: Patient

## 2019-03-25 PROCEDURE — 74011250637 HC RX REV CODE- 250/637: Performed by: OBSTETRICS & GYNECOLOGY

## 2019-03-25 PROCEDURE — 65410000002 HC RM PRIVATE OB

## 2019-03-25 RX ADMIN — IBUPROFEN 800 MG: 400 TABLET ORAL at 12:10

## 2019-03-25 RX ADMIN — IBUPROFEN 800 MG: 400 TABLET ORAL at 04:30

## 2019-03-25 RX ADMIN — IBUPROFEN 800 MG: 400 TABLET ORAL at 20:59

## 2019-03-25 RX ADMIN — Medication 1 TABLET: at 10:44

## 2019-03-25 NOTE — LACTATION NOTE
This note was copied from a baby's chart. Biological Nurturing breastfeeding principles taught. How Biological Nurturing (BN) 
promotes optimal breastfeeding (BF) sessions discussed. Mother encouraged to seek comfortable semi-reclining breastfeeding positions. Infant placed frontally along maternal contour. Primitive innate feeding reflexes/behaviors of the  discussed. BN tips and techniques shared; assisted with comfortable breastfeeding positioning. Pt will successfully establish breastfeeding by feeding in response to early feeding cues  
or wake every 3h, will obtain deep latch, and will keep log of feedings/output. Taught to BF at hunger cues and or q 2-3 hrs and to offer 10-20 drops of hand expressed colostrum at any non-feeds. Breast Assessment Left Breast: Small , Medium Left Nipple: Everted, Intact Right Breast: Small , Medium Right Nipple: Everted, Intact Breast- Feeding Assessment Attends Breast-Feeding Classes: No 
Breast-Feeding Experience: Yes(5 months) Breast Trauma/Surgery: No 
Type/Quality: Good Lactation Consultant Visits Breast-Feedings: Good Mother/Infant Observation Mother Observation: Alignment, Breast comfortable, Lets baby end feeding, Nipple round on release, Recognizes feeding cues Infant Observation: Lips flanged, upper, Lips flanged, lower, Latches nipple and aereolae, Rhythmic suck, Relaxed after feeding, Opens mouth, Frenulum checked LATCH Documentation Latch: Grasps breast, tongue down, lips flanged, rhythmic sucking Audible Swallowing: Spontaneous and intermittent (24 hours old) Type of Nipple: Everted (after stimulation) Comfort (Breast/Nipple): Soft/non-tender Hold (Positioning): Full assist, teach one side, mother does other, staff holds LATCH Score: 9 Placed baby in natural breastfeeding position. Baby latched with good sucking bursts. Mom states\" I really like this position. \"    Baby having good sucking bursts.

## 2019-03-25 NOTE — ROUTINE PROCESS
Bedside and Verbal shift change report given to OSMANI Villaseñor RN (oncoming nurse) by YORDY Harrington RNC-MNN (offgoing nurse). Report included the following information SBAR, Procedure Summary, Intake/Output, MAR and Recent Results.

## 2019-03-25 NOTE — PROGRESS NOTES
CNMPostpartumNoteDay1 S: Patient reports that she is feeling well after her vaginal delivery. She is ambulating and voiding without difficulty. Patient happy with birth experience. Breastfeeding well. No complaints. O: Vital signs stable, Heart RRR without murmur, Lungs CTA bilaterally, fundus at U-1, lochia rubra normal in amount, no signs of DVT. Visit Vitals /66 (BP 1 Location: Right arm, BP Patient Position: At rest) Pulse 70 Temp 97.9 °F (36.6 °C) Resp 16 Ht 5' 3\" (1.6 m) Wt 66.7 kg (147 lb) SpO2 100% Breastfeeding? Unknown BMI 26.04 kg/m² A: Postpartum Day 1 from a vaginal delivery  
breastfeeding O+/RI/GBS negative P: Continue current postpartum orders Lactation consult Anticipate discharge tomorrow Offer Tdap if has not had this pregnancy 1 Yoni Barakat CNM 
3/25/2019 
7:30 AM

## 2019-03-25 NOTE — PROGRESS NOTES
Bedside shift change report given to YORDY Plasencia Report consisted of patients Situation, Background, Assessment and Recommendations(SBAR). Opportunity for questions and clarification was provided. Care relinquished.

## 2019-03-25 NOTE — LACTATION NOTE
This note was copied from a baby's chart. Discussed with mother her plan for feeding. Reviewed the benefits of exclusive breast milk feeding during the hospital stay. Informed her of the risks of using formula to supplement in the first few days of life as well as the benefits of successful breast milk feeding; referred her to the Breastfeeding booklet about this information. She acknowledges understanding of information reviewed and states that it is her plan to breastfeed her infant. Will support her choice and offer additional information as needed. Reviewed breastfeeding basics:  How milk is made and normal  breastfeeding behaviors discussed. Supply and demand,  stomach size, early feeding cues, skin to skin bonding with comfortable positioning and baby led latch-on reviewed. How to identify signs of successful breastfeeding sessions reviewed; education on assymetrical latch, signs of effective latching vs shallow, in-effective latching, normal  feeding frequency and duration and expected infant output discussed. Normal course of breastfeeding discussed including the AAP's recommendation that children receive exclusive breast milk feedings for the first six months of life with breast milk feedings to continue through the first year of life and/or beyond as complimentary table foods are added. Breastfeeding Booklet and Warm line information provided with discussion. Discussed typical  weight loss and the importance of pediatrician appointment within 24-48 hours of discharge, at 2 weeks of life and normalcy of requesting pediatric weight checks as needed in between visits. Mom states\" Baby has voided twice, no stool yet. I feed baby when she makes moves or mouth and or tongue. She needs to open wide. \" Instructed Mom to open wide when baby is looking t her because babies will mimic. Instructed Mom to call [address] [facility] when she gets ready to feed.

## 2019-03-25 NOTE — ROUTINE PROCESS
1900 Bedside shift change report given to Sherwin Russo RN (oncoming nurse) by Liz Robison. Prachi Fernandez [facility] (offgoing nurse). Report included the following information SBAR, Kardex, Intake/Output and MAR.

## 2019-03-26 VITALS
SYSTOLIC BLOOD PRESSURE: 128 MMHG | HEIGHT: 63 IN | BODY MASS INDEX: 26.05 KG/M2 | TEMPERATURE: 97.8 F | OXYGEN SATURATION: 100 % | HEART RATE: 57 BPM | WEIGHT: 147 LBS | DIASTOLIC BLOOD PRESSURE: 75 MMHG | RESPIRATION RATE: 16 BRPM

## 2019-03-26 PROCEDURE — 74011250637 HC RX REV CODE- 250/637: Performed by: OBSTETRICS & GYNECOLOGY

## 2019-03-26 RX ORDER — IBUPROFEN 200 MG
800 CAPSULE ORAL
Qty: 30 CAP | Refills: 0 | Status: SHIPPED | OUTPATIENT
Start: 2019-03-26 | End: 2020-06-29

## 2019-03-26 RX ADMIN — Medication 1 TABLET: at 09:08

## 2019-03-26 RX ADMIN — IBUPROFEN 800 MG: 400 TABLET ORAL at 07:25

## 2019-03-26 NOTE — LACTATION NOTE
This note was copied from a baby's chart. Early term AGA female Nursing well x 11 
3 wet 1 stool 40 hours of life. Assisted mother with navigating her breast pump benefit. Received a Spectra with her last (14 months ago) List of providers and rental stations along with [facility] rental information. [address] [facility] with  [facility] for outpatient lactation support available. No questions or concerns at this time. Warmline # provided. Expect success. Call prn Breast Assessment Left Breast: Small , Medium Left Nipple: Everted, Intact Right Breast: Small , Medium Right Nipple: Everted, Intact Breast- Feeding Assessment Attends Breast-Feeding Classes: No 
Breast-Feeding Experience: Yes(5 months) Breast Trauma/Surgery: No 
Type/Quality: Good Lactation Consultant Visits Breast-Feedings: Good Mother/Infant Observation Mother Observation: Alignment, Breast comfortable, Lets baby end feeding, Nipple round on release, Recognizes feeding cues Infant Observation: Lips flanged, upper, Lips flanged, lower, Latches nipple and aereolae, Rhythmic suck, Relaxed after feeding, Opens mouth, Frenulum checked LATCH Documentation Latch: Grasps breast, tongue down, lips flanged, rhythmic sucking Audible Swallowing: Spontaneous and intermittent (24 hours old) Type of Nipple: Everted (after stimulation) Comfort (Breast/Nipple): Soft/non-tender Hold (Positioning): No assist from staff, mother able to position/hold infant LATCH Score: 10

## 2019-03-26 NOTE — DISCHARGE INSTRUCTIONS
Patient Education        Postpartum: Care Instructions  Your Care Instructions  After childbirth (postpartum period), your body goes through many changes. Some of these changes happen over several weeks. In the hours after delivery, your body will begin to recover from childbirth while it prepares to breastfeed your . You may feel emotional during this time. Your hormones can shift your mood without warning for no clear reason. In the first couple of weeks after childbirth, many women have emotions that change from happy to sad. You may find it hard to sleep. You may cry a lot. This is called the \"baby blues. \" These overwhelming emotions often go away within a couple of days or weeks. But it's important to discuss your feelings with your doctor. It is easy to get too tired and overwhelmed during the first weeks after childbirth. Don't try to do too much. Get rest whenever you can, accept help from others, and eat well and drink plenty of fluids. About 4 to 6 weeks after your baby's birth, you will have a follow-up visit with your doctor. This visit is your time to talk to your doctor about anything you are concerned or curious about. Follow-up care is a key part of your treatment and safety. Be sure to make and go to all appointments, and call your doctor if you are having problems. It's also a good idea to know your test results and keep a list of the medicines you take. How can you care for yourself at home? · Sleep or rest when your baby sleeps. · Get help with household chores from family or friends, if you can. Do not try to do it all yourself. · If you have hemorrhoids or swelling or pain around the opening of your vagina, try using cold and heat. You can put ice or a cold pack on the area for 10 to 20 minutes at a time. Put a thin cloth between the ice and your skin. Also try sitting in a few inches of warm water (sitz bath) 3 times a day and after bowel movements.   · Take pain medicines exactly as directed. ? If the doctor gave you a prescription medicine for pain, take it as prescribed. ? If you are not taking a prescription pain medicine, ask your doctor if you can take an over-the-counter medicine. · Eat more fiber to avoid constipation. Include foods such as whole-grain breads and cereals, raw vegetables, raw and dried fruits, and beans. · Drink plenty of fluids, enough so that your urine is light yellow or clear like water. If you have kidney, heart, or liver disease and have to limit fluids, talk with your doctor before you increase the amount of fluids you drink. · Do not rinse inside your vagina with fluids (douche). · If you have stitches, keep the area clean by pouring or spraying warm water over the area outside your vagina and anus after you use the toilet. · Keep a list of questions to bring to your postpartum visit. Your questions might be about:  ? Changes in your breasts, such as lumps or soreness. ? When to expect your menstrual period to start again. ? What form of birth control is best for you. ? Weight you have put on during the pregnancy. ? Exercise options. ? What foods and drinks are best for you, especially if you are breastfeeding. ? Problems you might be having with breastfeeding. ? When you can have sex. Some women may want to talk about lubricants for the vagina. ? Any feelings of sadness or restlessness that you are having. When should you call for help? Call 911 anytime you think you may need emergency care.  For example, call if:    · You have thoughts of harming yourself, your baby, or another person.     · You passed out (lost consciousness).    Call your doctor now or seek immediate medical care if:    · Your vaginal bleeding seems to be getting heavier.     · You are dizzy or lightheaded, or you feel like you may faint.     · You have a fever.    Watch closely for changes in your health, and be sure to contact your doctor if:    · You have new or worse vaginal discharge.     · You feel sad or depressed.     · You are having problems with your breasts or breastfeeding. Where can you learn more? Go to http://robbie-zehra.info/. Enter V804 in the search box to learn more about \"Postpartum: Care Instructions. \"  Current as of: September 5, 2018  Content Version: 11.9  © 8403-4441 [employer]. Care instructions adapted under license by [employer] (which disclaims liability or warranty for this information). If you have questions about a medical condition or this instruction, always ask your healthcare professional. [place] [address] any warranty or liability for your use of this information.

## 2019-03-26 NOTE — PROGRESS NOTES
Bedside shift change report given to OSMANI Collazo (oncoming nurse) by [employer] (offgoing nurse). Report included the following information SBAR, Intake/Output, MAR and Recent Results.

## 2019-03-26 NOTE — PROGRESS NOTES
Discharge instructions given to pt. Understanding voiced. Awaiting infant's discharge. 1149: Waiting for ride. 1310: Discharged with infant. Taken to vehicle by hospital volunteers.

## 2019-03-26 NOTE — PROGRESS NOTES
Post-Partum Day Number 2 Progress Note Elvira Roldan  
 
Information for the patient's :  Damian Mejía [[id number]] Vaginal, Spontaneous Patient doing well without significant complaint. Voiding without difficulty, normal lochia. Vitals:   
Visit Vitals /65 (BP 1 Location: Right arm, BP Patient Position: At rest) Pulse 72 Temp 97.9 °F (36.6 °C) Resp 17 Ht 5' 3\" (1.6 m) Wt 66.7 kg (147 lb) SpO2 100% Breastfeeding? Unknown BMI 26.04 kg/m² Temp (24hrs), Av °F (36.7 °C), Min:97.8 °F (36.6 °C), Max:98.3 °F (36.8 °C) Exam:    
    Patient without distress. Abdomen soft, fundus firm, nontender Lower extremities are negative for swelling, cords or tenderness. Labs:  
 
Lab Results Component Value Date/Time  WBC 8.5 2019 05:19 AM  
 WBC 7.6 03/10/2019 10:57 AM  
 WBC 7.6 2018 11:15 AM  
 WBC 11.0 2018 05:15 PM  
 WBC 8.7 2017 09:41 PM  
 WBC 8.4 2017 12:14 AM  
 WBC 6.0 2017 07:59 PM  
 WBC 8.3 2016 07:35 PM  
 WBC 6.4 2016 09:40 AM  
 WBC 9.3 10/23/2016 08:27 PM  
 WBC 12.0 (H) 10/19/2016 04:23 AM  
 WBC 7.5 2016 08:55 PM  
 WBC 6.7 2016 11:52 AM  
 WBC CANCELED 2016 11:52 AM  
 WBC 10.2 (H) 2013 04:05 PM  
 HGB 9.5 (L) 2019 05:19 AM  
 HGB 9.9 (L) 03/10/2019 10:57 AM  
 HGB 11.8 2018 11:15 AM  
 HGB 11.4 (L) 2018 05:15 PM  
 HGB 11.4 (L) 2017 09:41 PM  
 HGB 12.4 2017 12:14 AM  
 HGB 11.8 2017 07:59 PM  
 HGB 11.5 2016 07:35 PM  
 HGB 13.6 2016 09:40 AM  
 HGB 13.2 10/23/2016 08:27 PM  
 HGB 13.5 10/19/2016 04:23 AM  
 HGB 12.7 2016 08:55 PM  
 HGB 12.8 2016 11:52 AM  
 HGB CANCELED 2016 11:52 AM  
 HGB 13.0 2013 04:05 PM  
 HCT 29.4 (L) 2019 05:19 AM  
 HCT 31.1 (L) 03/10/2019 10:57 AM  
 HCT 34.3 (L) 2018 11:15 AM  
 HCT 34.5 (L) 2018 05:15 PM  
 HCT 34.0 (L) 07/19/2017 09:41 PM  
 HCT 37.2 04/25/2017 12:14 AM  
 HCT 34.3 (L) 01/28/2017 07:59 PM  
 HCT 34.3 (L) 12/19/2016 07:35 PM  
 HCT 39.4 12/02/2016 09:40 AM  
 HCT 38.6 10/23/2016 08:27 PM  
 HCT 39.6 10/19/2016 04:23 AM  
 HCT 38.7 09/28/2016 08:55 PM  
 HCT 38.7 09/27/2016 11:52 AM  
 HCT CANCELED 09/26/2016 11:52 AM  
 HCT 39.6 11/19/2013 04:05 PM  
 PLATELET 275 83/21/1993 05:19 AM  
 PLATELET 747 46/85/4191 10:57 AM  
 PLATELET 260 55/36/0309 11:15 AM  
 PLATELET 324 97/98/9226 05:15 PM  
 PLATELET 216 55/86/3776 09:41 PM  
 PLATELET 485 61/56/3957 12:14 AM  
 PLATELET 839 57/31/6221 07:59 PM  
 PLATELET 054 91/30/6040 07:35 PM  
 PLATELET 963 09/74/9795 09:40 AM  
 PLATELET 863 45/34/5915 08:27 PM  
 PLATELET 650 75/40/2731 04:23 AM  
 PLATELET 700 83/68/5855 08:55 PM  
 PLATELET 124 49/05/7789 11:52 AM  
 PLATELET CANCELED 39/22/0515 11:52 AM  
 PLATELET 101 72/09/6617 04:05 PM  
 
 
No results found for this or any previous visit (from the past 24 hour(s)). Assessment: Doing well, post partum day 2 Plan: 1. Discharge home today 2. Follow up in office in 6 weeks with Denise Aguilar 
3. Post partum activity advised, diet as tolerated 4. Discharge Medications: ibuprofen, percocet and medications prior to admission

## 2019-04-18 NOTE — DISCHARGE SUMMARY
Obstetrical Discharge Summary     Name: Jarvis Monae MRN: [id number]  SSN: [id number]    YOB: 1995  Age: 22 y.o. Sex: female      Allergies: Penicillins and Vancomycin    Admit Date: 3/24/2019    Discharge Date: 2019     Admitting Physician: Joss Em DO     Attending Physician:  No att. providers found     * Admission Diagnoses: Pregnancy [Z34.90]  Pregnancy [E75.74]    * Discharge Diagnoses:   Information for the patient's :  Karri Hernandes [[id number]]   Delivery of a 3.285 kg female infant via Vaginal, Spontaneous on 3/24/2019 at 3:53 PM  by Bradford Fontenot. Apgars were 8  and 9 . Additional Diagnoses:   Problem List as of 3/26/2019 Date Reviewed: 3/24/2019          Codes Class Noted - Resolved    Pregnancy ICD-10-CM: Z34.90  ICD-9-CM: V22.2  3/24/2019 - Present        Threatened  labor, third trimester ICD-10-CM: O47.03  ICD-9-CM: 644.03  3/10/2019 - Present        Pregnant ICD-10-CM: Z34.90  ICD-9-CM: V22.2  2018 - Present        Acute cystitis without hematuria ICD-10-CM: N30.00  ICD-9-CM: 595.0  2016 - Present        Hypokalemia ICD-10-CM: E87.6  ICD-9-CM: 276.8  10/27/2016 - Present        Hypoglycemia ICD-10-CM: E16.2  ICD-9-CM: 251.2  10/27/2016 - Present        Anxiety ICD-10-CM: F41.9  ICD-9-CM: 300.00  10/27/2016 - Present             Lab Results   Component Value Date/Time    Rubella, External Immune 31.40 2018    ABO,Rh O POSITIVE 2017       Immunization(s):   Immunization History   Administered Date(s) Administered    MMR 2018    Tdap 2018        * Procedures:    * No surgery found *           * Discharge Condition: good    * Hospital Course: Normal hospital course following the delivery. * Disposition: Home    Discharge Medications:   Discharge Medication List as of 3/26/2019  9:28 AM          * Follow-up Care/Patient Instructions:   Activity: Activity as tolerated  Diet: Regular Diet  Wound Care: None needed    Follow-up Information     Follow up With Specialties Details Why Contact Info    Joan Gonzalez MD [facility]   [address] [facility] [address] [phone]                        .

## 2019-04-18 NOTE — DISCHARGE SUMMARY
Obstetrical Discharge Summary     Name: Soledad Ruiz MRN: [id number]  SSN: [id number]    YOB: 1995  Age: 22 y.o. Sex: female      Allergies: Penicillins and Vancomycin    Admit Date: 3/24/2019    Discharge Date: 2019     Admitting Physician: Elkin Thakur DO     Attending Physician:  No att. providers found     * Admission Diagnoses: Pregnancy [Z34.90]  Pregnancy [F06.24]    * Discharge Diagnoses:   Information for the patient's :  Claudette Gasca [[id number]]   Delivery of a 3.285 kg female infant via Vaginal, Spontaneous on 3/24/2019 at 3:53 PM  by Temo Stringer. Apgars were 8  and 9 . Additional Diagnoses:   Problem List as of 3/26/2019 Date Reviewed: 3/24/2019          Codes Class Noted - Resolved    Pregnancy ICD-10-CM: Z34.90  ICD-9-CM: V22.2  3/24/2019 - Present        Threatened  labor, third trimester ICD-10-CM: O47.03  ICD-9-CM: 644.03  3/10/2019 - Present        Pregnant ICD-10-CM: Z34.90  ICD-9-CM: V22.2  2018 - Present        Acute cystitis without hematuria ICD-10-CM: N30.00  ICD-9-CM: 595.0  2016 - Present        Hypokalemia ICD-10-CM: E87.6  ICD-9-CM: 276.8  10/27/2016 - Present        Hypoglycemia ICD-10-CM: E16.2  ICD-9-CM: 251.2  10/27/2016 - Present        Anxiety ICD-10-CM: F41.9  ICD-9-CM: 300.00  10/27/2016 - Present             Lab Results   Component Value Date/Time    Rubella, External Immune 31.40 2018    ABO,Rh O POSITIVE 2017       Immunization(s):   Immunization History   Administered Date(s) Administered    MMR 2018    Tdap 2018        * Procedures:    * No surgery found *           * Discharge Condition: good    * Hospital Course: Normal hospital course following the delivery. * Disposition: Home    Discharge Medications:   Discharge Medication List as of 3/26/2019  9:28 AM          * Follow-up Care/Patient Instructions:   Activity: Activity as tolerated  Diet: Regular Diet  Wound Care: None needed    Follow-up Information     Follow up With Specialties Details Why Contact Info    Luke Steinberg MD [facility]   [address] [place] [address]  [phone]                        .

## 2019-07-26 ENCOUNTER — HOSPITAL ENCOUNTER (EMERGENCY)
Age: 24
Discharge: HOME | End: 2019-07-26
Attending: EMERGENCY MEDICINE
Payer: MEDICAID

## 2019-07-26 ENCOUNTER — APPOINTMENT (OUTPATIENT)
Dept: CT IMAGING | Age: 24
End: 2019-07-26
Attending: EMERGENCY MEDICINE
Payer: MEDICAID

## 2019-07-26 VITALS
HEART RATE: 64 BPM | RESPIRATION RATE: 16 BRPM | SYSTOLIC BLOOD PRESSURE: 172 MMHG | WEIGHT: 126 LBS | DIASTOLIC BLOOD PRESSURE: 78 MMHG | HEIGHT: 63 IN | BODY MASS INDEX: 22.32 KG/M2 | OXYGEN SATURATION: 100 % | TEMPERATURE: 98.4 F

## 2019-07-26 DIAGNOSIS — K59.00 CONSTIPATION, UNSPECIFIED CONSTIPATION TYPE: ICD-10-CM

## 2019-07-26 DIAGNOSIS — R10.84 ABDOMINAL PAIN, GENERALIZED: Primary | ICD-10-CM

## 2019-07-26 LAB
ALBUMIN SERPL-MCNC: 4.1 G/DL (ref 3.5–5)
ALBUMIN/GLOB SERPL: 1.1 {RATIO} (ref 1.1–2.2)
ALP SERPL-CCNC: 58 U/L (ref 45–117)
ALT SERPL-CCNC: 16 U/L (ref 12–78)
ANION GAP SERPL CALC-SCNC: 4 MMOL/L (ref 5–15)
APPEARANCE UR: ABNORMAL
AST SERPL-CCNC: 13 U/L (ref 15–37)
BACTERIA URNS QL MICRO: ABNORMAL /HPF
BASOPHILS # BLD: 0 K/UL (ref 0–0.1)
BASOPHILS NFR BLD: 1 % (ref 0–1)
BILIRUB SERPL-MCNC: 0.6 MG/DL (ref 0.2–1)
BILIRUB UR QL: NEGATIVE
BUN SERPL-MCNC: 9 MG/DL (ref 6–20)
BUN/CREAT SERPL: 12 (ref 12–20)
CALCIUM SERPL-MCNC: 8.9 MG/DL (ref 8.5–10.1)
CHLORIDE SERPL-SCNC: 110 MMOL/L (ref 97–108)
CO2 SERPL-SCNC: 28 MMOL/L (ref 21–32)
COLOR UR: ABNORMAL
CREAT SERPL-MCNC: 0.78 MG/DL (ref 0.55–1.02)
DIFFERENTIAL METHOD BLD: NORMAL
EOSINOPHIL # BLD: 0.1 K/UL (ref 0–0.4)
EOSINOPHIL NFR BLD: 1 % (ref 0–7)
EPITH CASTS URNS QL MICRO: ABNORMAL /LPF
ERYTHROCYTE [DISTWIDTH] IN BLOOD BY AUTOMATED COUNT: 12.6 % (ref 11.5–14.5)
GLOBULIN SER CALC-MCNC: 3.9 G/DL (ref 2–4)
GLUCOSE SERPL-MCNC: 83 MG/DL (ref 65–100)
GLUCOSE UR STRIP.AUTO-MCNC: NEGATIVE MG/DL
HCG UR QL: NEGATIVE
HCT VFR BLD AUTO: 38.6 % (ref 35–47)
HGB BLD-MCNC: 12.6 G/DL (ref 11.5–16)
HGB UR QL STRIP: NEGATIVE
HYALINE CASTS URNS QL MICRO: ABNORMAL /LPF (ref 0–5)
IMM GRANULOCYTES # BLD AUTO: 0 K/UL (ref 0–0.04)
IMM GRANULOCYTES NFR BLD AUTO: 0 % (ref 0–0.5)
KETONES UR QL STRIP.AUTO: NEGATIVE MG/DL
LEUKOCYTE ESTERASE UR QL STRIP.AUTO: ABNORMAL
LIPASE SERPL-CCNC: 131 U/L (ref 73–393)
LYMPHOCYTES # BLD: 2.1 K/UL (ref 0.8–3.5)
LYMPHOCYTES NFR BLD: 36 % (ref 12–49)
MCH RBC QN AUTO: 29 PG (ref 26–34)
MCHC RBC AUTO-ENTMCNC: 32.6 G/DL (ref 30–36.5)
MCV RBC AUTO: 88.7 FL (ref 80–99)
MONOCYTES # BLD: 0.4 K/UL (ref 0–1)
MONOCYTES NFR BLD: 8 % (ref 5–13)
NEUTS SEG # BLD: 3.1 K/UL (ref 1.8–8)
NEUTS SEG NFR BLD: 54 % (ref 32–75)
NITRITE UR QL STRIP.AUTO: NEGATIVE
NRBC # BLD: 0 K/UL (ref 0–0.01)
NRBC BLD-RTO: 0 PER 100 WBC
PH UR STRIP: 7 [PH] (ref 5–8)
PLATELET # BLD AUTO: 244 K/UL (ref 150–400)
PMV BLD AUTO: 10.1 FL (ref 8.9–12.9)
POTASSIUM SERPL-SCNC: 3.5 MMOL/L (ref 3.5–5.1)
PROT SERPL-MCNC: 8 G/DL (ref 6.4–8.2)
PROT UR STRIP-MCNC: NEGATIVE MG/DL
RBC # BLD AUTO: 4.35 M/UL (ref 3.8–5.2)
RBC #/AREA URNS HPF: ABNORMAL /HPF (ref 0–5)
SODIUM SERPL-SCNC: 142 MMOL/L (ref 136–145)
SP GR UR REFRACTOMETRY: 1.02 (ref 1–1.03)
UR CULT HOLD, URHOLD: NORMAL
UROBILINOGEN UR QL STRIP.AUTO: 1 EU/DL (ref 0.2–1)
WBC # BLD AUTO: 5.8 K/UL (ref 3.6–11)
WBC URNS QL MICRO: ABNORMAL /HPF (ref 0–4)

## 2019-07-26 PROCEDURE — 99282 EMERGENCY DEPT VISIT SF MDM: CPT

## 2019-07-26 PROCEDURE — 74176 CT ABD & PELVIS W/O CONTRAST: CPT

## 2019-07-26 PROCEDURE — 74011250636 HC RX REV CODE- 250/636: Performed by: EMERGENCY MEDICINE

## 2019-07-26 PROCEDURE — 85025 COMPLETE CBC W/AUTO DIFF WBC: CPT

## 2019-07-26 PROCEDURE — 80053 COMPREHEN METABOLIC PANEL: CPT

## 2019-07-26 PROCEDURE — 36415 COLL VENOUS BLD VENIPUNCTURE: CPT

## 2019-07-26 PROCEDURE — 87086 URINE CULTURE/COLONY COUNT: CPT

## 2019-07-26 PROCEDURE — 83690 ASSAY OF LIPASE: CPT

## 2019-07-26 PROCEDURE — 81025 URINE PREGNANCY TEST: CPT

## 2019-07-26 PROCEDURE — 81001 URINALYSIS AUTO W/SCOPE: CPT

## 2019-07-26 PROCEDURE — 96374 THER/PROPH/DIAG INJ IV PUSH: CPT

## 2019-07-26 RX ORDER — KETOROLAC TROMETHAMINE 30 MG/ML
15 INJECTION, SOLUTION INTRAMUSCULAR; INTRAVENOUS
Status: COMPLETED | OUTPATIENT
Start: 2019-07-26 | End: 2019-07-26

## 2019-07-26 RX ADMIN — KETOROLAC TROMETHAMINE 15 MG: 30 INJECTION, SOLUTION INTRAMUSCULAR at 14:15

## 2019-07-26 NOTE — ED PROVIDER NOTES
22 y.o. female with past medical history significant for hypothyroid, anxiety, chlamydia, and kidney disease who presents from private vehicle with chief complaint of abdominal pain. Pt reports periumbilical abdominal pain since 0800 this morning. Pt denies fever, chills, V/D, blood in stool, hematuria, dysuria, frequency, vaginal bleeding, or vaginal discharge. There are no other acute medical concerns at this time. PCP: Douglas Garcias MD    Note written by Iveth Barragan, as dictated by Creola Litten, MD 12:22 PM    The history is provided by the patient. No  was used. Past Medical History:   Diagnosis Date    Chlamydia     Endocrine disease     hypoglycemia    Gastrointestinal disorder     GERD    Hypokalemia     Ill-defined condition     kidney failure    Kidney disease     \"kidney failure a few years ago\"    Panic attacks     Psychiatric disorder     anxiety, panic disorder    Trichomonal infection        No past surgical history on file.       Family History:   Problem Relation Age of Onset    Migraines Mother     Hypertension Father     Hypertension Maternal Uncle     Diabetes Paternal Aunt     Diabetes Paternal Uncle     Hypertension Paternal Grandfather     Stroke Paternal Grandfather        Social History     Socioeconomic History    Marital status: SINGLE     Spouse name: Not on file    Number of children: Not on file    Years of education: Not on file    Highest education level: Not on file   Occupational History    Not on file   Social Needs    Financial resource strain: Not hard at all   Bag Borrow or Steal insecurity:     Worry: Never true     Inability: Never true   [employer] needs:     Medical: No     Non-medical: No   Tobacco Use    Smoking status: Never Smoker    Smokeless tobacco: Never Used   Substance and Sexual Activity    Alcohol use: No    Drug use: No    Sexual activity: Yes     Partners: Male     Birth control/protection: Condom   Lifestyle    Physical activity:     Days per week: Patient refused     Minutes per session: Patient refused    Stress: Not at all   Relationships    Social connections:     Talks on phone: More than three times a week     Gets together: More than three times a week     Attends Orthodoxy service: Not on file     Active member of club or organization: No     Attends meetings of clubs or organizations: Never     Relationship status: Living with partner    Intimate partner violence:     Fear of current or ex partner: No     Emotionally abused: No     Physically abused: No     Forced sexual activity: No   Other Topics Concern     Service Not Asked    Blood Transfusions Not Asked    Caffeine Concern Not Asked    Occupational Exposure Not Asked    Hobby Hazards Not Asked    Sleep Concern Not Asked    Stress Concern Not Asked    Weight Concern Not Asked    Special Diet Not Asked    Back Care Not Asked    Exercise Not Asked    Bike Helmet Not Asked   [address] Not Asked    Self-Exams Not Asked   Social History Narrative    Not on file         ALLERGIES: Penicillins and Vancomycin    Review of Systems   Constitutional: Negative for chills and fever. Gastrointestinal: Positive for abdominal pain. Negative for blood in stool, diarrhea and vomiting. Genitourinary: Negative for dysuria, frequency, hematuria, urgency, vaginal bleeding and vaginal discharge. All other systems reviewed and are negative. Vitals:    07/26/19 1218   BP: 131/88   Pulse: 77   Resp: 18   Temp: 98.6 °F (37 °C)   SpO2: 100%   Weight: 57.2 kg (126 lb)   Height: 5' 3\" (1.6 m)            Physical Exam     Nursing note and vitals reviewed. Constitutional: appears well-developed and well-nourished. No distress. HENT:   Head: Normocephalic and atraumatic.  Sclera anicteric  Nose: No rhinorrhea  Mouth/Throat: Oropharynx is clear and moist. Pharynx normal  Eyes: Conjunctivae are normal. Pupils are equal, round, and reactive to light. Right eye exhibits no discharge. Left eye exhibits no discharge. No scleral icterus. Neck: Painless normal range of motion. Supple  Cardiovascular: Normal rate, regular rhythm, normal heart sounds and intact distal pulses. Exam reveals no gallop and no friction rub. No murmur heard. Pulmonary/Chest: Effort normal and breath sounds normal. No respiratory distress. no wheezes. no rales. Abdominal: Soft. Bowel sounds are normal. Exhibits no distension and no mass. PERIUMBILICAL ABD TENDERNESS. No guarding. Musculoskeletal: Normal range of motion. no tenderness. No edema  Lymphadenopathy:   No cervical adenopathy. Neurological:  Alert and oriented to person, place, and time. Coordination normal. CN 2-12 intact. Moving all extremities. Skin: Skin is warm and dry. No rash noted. No pallor. MDM      PERIUMBILICAL ABD PAIN. DDX:  APPY, UTI, CONSTIPATION AND OTHERS. CHECK UA, HCG, LABS, CT ABD PELVIS WITH CONTRAST (PT IS THIN). TORADOL FOR PAIN. Procedures    CT WITH CONSTIPATION. NO UTI SX'S AND EPI ON UA. ONLY 5-10 WBC AND SMALL LE ON UA SO WILL ADD URINE CULTURE BUT NOT TREAT WITH ANTIBIOTICS GIVEN ABSENCE OF DYSURIA, FREQUENCY, FEVER OR URGENCY. WBC NORMAL. Patient's results have been reviewed with them. Patient and/or family have verbally conveyed their understanding and agreement of the patient's signs, symptoms, diagnosis, treatment and prognosis and additionally agree to follow up as recommended or return to the Emergency Room should their condition change prior to follow-up. Discharge instructions have also been provided to the patient with some educational information regarding their diagnosis as well a list of reasons why they would want to return to the ER prior to their follow-up appointment should their condition change.        Recent Results (from the past 24 hour(s))   CBC WITH AUTOMATED DIFF    Collection Time: 07/26/19 12:37 PM   Result Value Ref Range    WBC 5.8 3.6 - 11.0 K/uL    RBC 4.35 3.80 - 5.20 M/uL    HGB 12.6 11.5 - 16.0 g/dL    HCT 38.6 35.0 - 47.0 %    MCV 88.7 80.0 - 99.0 FL    MCH 29.0 26.0 - 34.0 PG    MCHC 32.6 30.0 - 36.5 g/dL    RDW 12.6 11.5 - 14.5 %    PLATELET 739 171 - 300 K/uL    MPV 10.1 8.9 - 12.9 FL    NRBC 0.0 0  WBC    ABSOLUTE NRBC 0.00 0.00 - 0.01 K/uL    NEUTROPHILS 54 32 - 75 %    LYMPHOCYTES 36 12 - 49 %    MONOCYTES 8 5 - 13 %    EOSINOPHILS 1 0 - 7 %    BASOPHILS 1 0 - 1 %    IMMATURE GRANULOCYTES 0 0.0 - 0.5 %    ABS. NEUTROPHILS 3.1 1.8 - 8.0 K/UL    ABS. LYMPHOCYTES 2.1 0.8 - 3.5 K/UL    ABS. MONOCYTES 0.4 0.0 - 1.0 K/UL    ABS. EOSINOPHILS 0.1 0.0 - 0.4 K/UL    ABS. BASOPHILS 0.0 0.0 - 0.1 K/UL    ABS. IMM. GRANS. 0.0 0.00 - 0.04 K/UL    DF AUTOMATED     METABOLIC PANEL, COMPREHENSIVE    Collection Time: 07/26/19 12:37 PM   Result Value Ref Range    Sodium 142 136 - 145 mmol/L    Potassium 3.5 3.5 - 5.1 mmol/L    Chloride 110 (H) 97 - 108 mmol/L    CO2 28 21 - 32 mmol/L    Anion gap 4 (L) 5 - 15 mmol/L    Glucose 83 65 - 100 mg/dL    BUN 9 6 - 20 MG/DL    Creatinine 0.78 0.55 - 1.02 MG/DL    BUN/Creatinine ratio 12 12 - 20      GFR est AA >60 >60 ml/min/1.73m2    GFR est non-AA >60 >60 ml/min/1.73m2    Calcium 8.9 8.5 - 10.1 MG/DL    Bilirubin, total 0.6 0.2 - 1.0 MG/DL    ALT (SGPT) 16 12 - 78 U/L    AST (SGOT) 13 (L) 15 - 37 U/L    Alk.  phosphatase 58 45 - 117 U/L    Protein, total 8.0 6.4 - 8.2 g/dL    Albumin 4.1 3.5 - 5.0 g/dL    Globulin 3.9 2.0 - 4.0 g/dL    A-G Ratio 1.1 1.1 - 2.2     URINALYSIS W/MICROSCOPIC    Collection Time: 07/26/19 12:37 PM   Result Value Ref Range    Color YELLOW/STRAW      Appearance CLOUDY (A) CLEAR      Specific gravity 1.022 1.003 - 1.030      pH (UA) 7.0 5.0 - 8.0      Protein NEGATIVE  NEG mg/dL    Glucose NEGATIVE  NEG mg/dL    Ketone NEGATIVE  NEG mg/dL    Bilirubin NEGATIVE  NEG      Blood NEGATIVE  NEG      Urobilinogen 1.0 0.2 - 1.0 EU/dL    Nitrites NEGATIVE  NEG Leukocyte Esterase SMALL (A) NEG      WBC 5-10 0 - 4 /hpf    RBC 0-5 0 - 5 /hpf    Epithelial cells MODERATE (A) FEW /lpf    Bacteria 1+ (A) NEG /hpf    Hyaline cast 0-2 0 - 5 /lpf   URINE CULTURE HOLD SAMPLE    Collection Time: 07/26/19 12:37 PM   Result Value Ref Range    Urine culture hold        URINE ON HOLD IN MICROBIOLOGY DEPT FOR 3 DAYS. IF UNPRESERVED URINE IS SUBMITTED, IT CANNOT BE USED FOR ADDITIONAL TESTING AFTER 24 HRS, RECOLLECTION WILL BE REQUIRED. LIPASE    Collection Time: 07/26/19 12:37 PM   Result Value Ref Range    Lipase 131 73 - 393 U/L   HCG URINE, QL. - POC    Collection Time: 07/26/19 12:40 PM   Result Value Ref Range    Pregnancy test,urine (POC) NEGATIVE  NEG         Ct Abd Pelv Wo Cont    Result Date: 7/26/2019  EXAM: CT ABD PELV WO CONT INDICATION: periumbilical abdominal pain COMPARISON: 12.2.2016 CONTRAST:  None. TECHNIQUE: Thin axial images were obtained through the abdomen and pelvis. Coronal and sagittal reconstructions were generated. Oral contrast was not administered. CT dose reduction was achieved through use of a standardized protocol tailored for this examination and automatic exposure control for dose modulation. The absence of intravenous contrast material reduces the sensitivity for evaluation of the solid parenchymal organs of the abdomen. FINDINGS: LUNG BASES: Clear. INCIDENTALLY IMAGED HEART AND MEDIASTINUM: Unremarkable. LIVER: No mass or biliary dilatation. GALLBLADDER: Unremarkable. SPLEEN: No mass. PANCREAS: No mass or ductal dilatation. ADRENALS: Unremarkable. KIDNEYS/URETERS: No mass, calculus, or hydronephrosis. STOMACH: gastric distension SMALL BOWEL: No dilatation or wall thickening. COLON: No dilatation or wall thickening. constipation APPENDIX: Unremarkable. coronal image 41 PERITONEUM: No ascites or pneumoperitoneum. RETROPERITONEUM: No lymphadenopathy or aortic aneurysm. REPRODUCTIVE ORGANS: anteverted uterus URINARY BLADDER: No mass or calculus.  BONES: No destructive bone lesion.  ADDITIONAL COMMENTS: N/A     IMPRESSION: Constipation

## 2019-07-26 NOTE — DISCHARGE INSTRUCTIONS
Patient Education        Abdominal Pain: Care Instructions  Your Care Instructions    Abdominal pain has many possible causes. Some aren't serious and get better on their own in a few days. Others need more testing and treatment. If your pain continues or gets worse, you need to be rechecked and may need more tests to find out what is wrong. You may need surgery to correct the problem. Don't ignore new symptoms, such as fever, nausea and vomiting, urination problems, pain that gets worse, and dizziness. These may be signs of a more serious problem. Your doctor may have recommended a follow-up visit in the next 8 to 12 hours. If you are not getting better, you may need more tests or treatment. The doctor has checked you carefully, but problems can develop later. If you notice any problems or new symptoms, get medical treatment right away. Follow-up care is a key part of your treatment and safety. Be sure to make and go to all appointments, and call your doctor if you are having problems. It's also a good idea to know your test results and keep a list of the medicines you take. How can you care for yourself at home? · Rest until you feel better. · To prevent dehydration, drink plenty of fluids, enough so that your urine is light yellow or clear like water. Choose water and other caffeine-free clear liquids until you feel better. If you have kidney, heart, or liver disease and have to limit fluids, talk with your doctor before you increase the amount of fluids you drink. · If your stomach is upset, eat mild foods, such as rice, dry toast or crackers, bananas, and applesauce. Try eating several small meals instead of two or three large ones. · Wait until 48 hours after all symptoms have gone away before you have spicy foods, alcohol, and drinks that contain caffeine. · Do not eat foods that are high in fat. · Avoid anti-inflammatory medicines such as aspirin, ibuprofen (Advil, Motrin), and naproxen (Aleve). These can cause stomach upset. Talk to your doctor if you take daily aspirin for another health problem. When should you call for help? Call 911 anytime you think you may need emergency care. For example, call if:    · You passed out (lost consciousness).     · You pass maroon or very bloody stools.     · You vomit blood or what looks like coffee grounds.     · You have new, severe belly pain.    Call your doctor now or seek immediate medical care if:    · Your pain gets worse, especially if it becomes focused in one area of your belly.     · You have a new or higher fever.     · Your stools are black and look like tar, or they have streaks of blood.     · You have unexpected vaginal bleeding.     · You have symptoms of a urinary tract infection. These may include:  ? Pain when you urinate. ? Urinating more often than usual.  ? Blood in your urine.     · You are dizzy or lightheaded, or you feel like you may faint.    Watch closely for changes in your health, and be sure to contact your doctor if:    · You are not getting better after 1 day (24 hours). Where can you learn more? Go to http://robbieWaviizehra.info/. Enter O762 in the search box to learn more about \"Abdominal Pain: Care Instructions. \"  Current as of: September 23, 2018  Content Version: 12.1  © 1671-3621 [employer]. Care instructions adapted under license by [employer] (which disclaims liability or warranty for this information). If you have questions about a medical condition or this instruction, always ask your healthcare professional. [place] [address] any warranty or liability for your use of this information. Patient Education        Constipation: Care Instructions  Your Care Instructions    Constipation means that you have a hard time passing stools (bowel movements). People pass stools from 3 times a day to once every 3 days. What is normal for you may be different. Constipation may occur with pain in the rectum and cramping. The pain may get worse when you try to pass stools. Sometimes there are small amounts of bright red blood on toilet paper or the surface of stools. This is because of enlarged veins near the rectum (hemorrhoids). A few changes in your diet and lifestyle may help you avoid ongoing constipation. Your doctor may also prescribe medicine to help loosen your stool. Some medicines can cause constipation. These include pain medicines and antidepressants. Tell your doctor about all the medicines you take. Your doctor may want to make a medicine change to ease your symptoms. Follow-up care is a key part of your treatment and safety. Be sure to make and go to all appointments, and call your doctor if you are having problems. It's also a good idea to know your test results and keep a list of the medicines you take. How can you care for yourself at home? · Drink plenty of fluids, enough so that your urine is light yellow or clear like water. If you have kidney, heart, or liver disease and have to limit fluids, talk with your doctor before you increase the amount of fluids you drink. · Include high-fiber foods in your diet each day. These include fruits, vegetables, beans, and whole grains. · Get at least 30 minutes of exercise on most days of the week. Walking is a good choice. You also may want to do other activities, such as running, swimming, cycling, or playing tennis or team sports. · Take a fiber supplement, such as Citrucel or Metamucil, every day. Read and follow all instructions on the label. · Schedule time each day for a bowel movement. A daily routine may help. Take your time having your bowel movement. · Support your feet with a small step stool when you sit on the toilet. This helps flex your hips and places your pelvis in a squatting position. · Your doctor may recommend an over-the-counter laxative to relieve your constipation.  Examples are Milk of Magnesia and MiraLax. Read and follow all instructions on the label. Do not use laxatives on a long-term basis. When should you call for help? Call your doctor now or seek immediate medical care if:    · You have new or worse belly pain.     · You have new or worse nausea or vomiting.     · You have blood in your stools.    Watch closely for changes in your health, and be sure to contact your doctor if:    · Your constipation is getting worse.     · You do not get better as expected. Where can you learn more? Go to http://robbie-zehra.info/. Enter 21  in the search box to learn more about \"Constipation: Care Instructions. \"  Current as of: September 23, 2018  Content Version: 12.1  © 0671-4510 [employer]. Care instructions adapted under license by [employer] (which disclaims liability or warranty for this information). If you have questions about a medical condition or this instruction, always ask your healthcare professional. [address] any warranty or liability for your use of this information.

## 2019-07-28 LAB
BACTERIA SPEC CULT: NORMAL
CC UR VC: NORMAL
SERVICE CMNT-IMP: NORMAL

## 2020-06-29 ENCOUNTER — HOSPITAL ENCOUNTER (EMERGENCY)
Age: 25
Discharge: HOME | End: 2020-06-29
Attending: EMERGENCY MEDICINE
Payer: COMMERCIAL

## 2020-06-29 VITALS
HEART RATE: 81 BPM | OXYGEN SATURATION: 100 % | BODY MASS INDEX: 22.32 KG/M2 | RESPIRATION RATE: 14 BRPM | TEMPERATURE: 98.9 F | HEIGHT: 63 IN | DIASTOLIC BLOOD PRESSURE: 87 MMHG | SYSTOLIC BLOOD PRESSURE: 121 MMHG

## 2020-06-29 DIAGNOSIS — N76.4 LEFT GENITAL LABIAL ABSCESS: Primary | ICD-10-CM

## 2020-06-29 PROCEDURE — 99283 EMERGENCY DEPT VISIT LOW MDM: CPT

## 2020-06-29 PROCEDURE — 74011250637 HC RX REV CODE- 250/637: Performed by: PHYSICIAN ASSISTANT

## 2020-06-29 RX ORDER — ONDANSETRON 4 MG/1
4 TABLET, ORALLY DISINTEGRATING ORAL
Status: COMPLETED | OUTPATIENT
Start: 2020-06-29 | End: 2020-06-29

## 2020-06-29 RX ORDER — SULFAMETHOXAZOLE AND TRIMETHOPRIM 800; 160 MG/1; MG/1
1 TABLET ORAL 2 TIMES DAILY
Qty: 14 TAB | Refills: 0 | Status: SHIPPED | OUTPATIENT
Start: 2020-06-29 | End: 2020-07-01 | Stop reason: SDUPTHER

## 2020-06-29 RX ORDER — IBUPROFEN 600 MG/1
600 TABLET ORAL
Qty: 15 TAB | Refills: 0 | Status: SHIPPED | OUTPATIENT
Start: 2020-06-29 | End: 2020-07-13

## 2020-06-29 RX ORDER — ACETAMINOPHEN 500 MG
1000 TABLET ORAL
Status: COMPLETED | OUTPATIENT
Start: 2020-06-29 | End: 2020-06-29

## 2020-06-29 RX ADMIN — ONDANSETRON 4 MG: 4 TABLET, ORALLY DISINTEGRATING ORAL at 11:50

## 2020-06-29 RX ADMIN — ACETAMINOPHEN 1000 MG: 500 TABLET ORAL at 11:50

## 2020-06-29 NOTE — DISCHARGE INSTRUCTIONS
Room temperature Sitz baths several times daily, otherwise keep skin clean and dry. Complete antibiotics as prescribed. Follow up with Ob/Gyn for wound check or return to the Emergency Dept if symptoms worsen.

## 2020-06-29 NOTE — ED NOTES
GEORGE Moon at bedside to provide discharge paperwork. Vital signs stable. Pt in no apparent distress at this time. Mental status at baseline. Ambulatory to waiting room with steady gate, discharge paperwork in hand. Patient and or family acknowledged and signed discharge instructions that were created/provided by the Physician. Signed discharge form with patient label placed in scan box.

## 2020-06-30 NOTE — ED PROVIDER NOTES
EMERGENCY DEPARTMENT HISTORY AND PHYSICAL EXAM      Date: 6/29/2020  Patient Name: Joseph Alvarez    History of Presenting Illness     Chief Complaint   Patient presents with    Skin Problem     pt reports what she thinks she has an in grown hair in her vagina area. pt denies fevers or chills. reports some yellow color drainage       History Provided By: Patient    HPI: Joseph Alvarez, 26 y.o. female presents to the Emergency Dept with c/o possible abscess to L lower labia. She denied h/o similar sx. She states she first noted this several days ago and tried warm compresses and \"poking it\" without relief. She has noted minimal drainage, yellow. No fevers/chills. +swelling  Increased pain with palpation/ambulation. She is a nonsmoker. Pt is o/w healthy without cough, congestion, ST, shortness of breath, chest pain, N/V/D. Chief Complaint: tender mass noted to L labia  Duration: 3-4 Days  Timing:  Acute  Location: L Labia  Quality: Pressure  Severity: Mild to moderate  Modifying Factors: no relief with warm compresses  Associated Symptoms: denies any other associated signs or symptoms        There are no other complaints, changes, or physical findings at this time. PCP: None    Current Outpatient Medications   Medication Sig Dispense Refill    trimethoprim-sulfamethoxazole (Bactrim DS) 160-800 mg per tablet Take 1 Tab by mouth two (2) times a day for 7 days. 14 Tab 0    ibuprofen (MOTRIN) 600 mg tablet Take 1 Tab by mouth every six (6) hours as needed for Pain for up to 15 doses.  15 Tab 0       Past History     Past Medical History:  Past Medical History:   Diagnosis Date    Chlamydia     Endocrine disease     hypoglycemia    Gastrointestinal disorder     GERD    Hypokalemia     Ill-defined condition     kidney failure    Kidney disease     \"kidney failure a few years ago\"    Panic attacks     Psychiatric disorder     anxiety, panic disorder    Trichomonal infection        Past Surgical History:  History reviewed. No pertinent surgical history. Family History:  Family History   Problem Relation Age of Onset    Migraines Mother     Hypertension Father     Hypertension Maternal Uncle     Diabetes Paternal Aunt     Diabetes Paternal Uncle     Hypertension Paternal Grandfather     Stroke Paternal Grandfather        Social History:  Social History     Tobacco Use    Smoking status: Never Smoker    Smokeless tobacco: Never Used   Substance Use Topics    Alcohol use: No    Drug use: No       Allergies: Allergies   Allergen Reactions    Penicillins Hives    Vancomycin Itching and Other (comments)     Lips went numb         Review of Systems   Review of Systems   Constitutional: Negative for chills and fever. HENT: Negative for congestion, rhinorrhea and sore throat. Respiratory: Negative for cough and shortness of breath. Cardiovascular: Negative for chest pain and palpitations. Gastrointestinal: Negative for abdominal pain, diarrhea, nausea and vomiting. Genitourinary: Negative for dysuria and hematuria. Musculoskeletal: Negative for neck pain and neck stiffness. Skin: Positive for color change. Negative for rash and wound. Allergic/Immunologic: Negative for food allergies and immunocompromised state. Neurological: Negative for dizziness and headaches. Hematological: Negative for adenopathy. Does not bruise/bleed easily. Psychiatric/Behavioral: Negative for agitation and confusion. All other systems reviewed and are negative. Physical Exam   Physical Exam  Vitals signs and nursing note reviewed. Constitutional:       General: She is not in acute distress. Appearance: Normal appearance. She is well-developed and normal weight. She is not diaphoretic. HENT:      Head: Normocephalic and atraumatic. Nose: Nose normal.      Mouth/Throat:      Pharynx: No oropharyngeal exudate. Eyes:      General: No scleral icterus. Right eye: No discharge. Left eye: No discharge. Conjunctiva/sclera: Conjunctivae normal.   Neck:      Musculoskeletal: Normal range of motion and neck supple. Thyroid: No thyromegaly. Vascular: No JVD. Trachea: No tracheal deviation. Cardiovascular:      Rate and Rhythm: Normal rate and regular rhythm. Pulses: Normal pulses. Heart sounds: Normal heart sounds. Pulmonary:      Effort: Pulmonary effort is normal. No respiratory distress. Breath sounds: Normal breath sounds. No wheezing. Musculoskeletal: Normal range of motion. Lymphadenopathy:      Cervical: No cervical adenopathy. Skin:     General: Skin is warm and dry. Findings: Erythema present. Comments: 1.5cm firm, nonfluctuant nodule palpated inferior L labia majora, mild erythema, +tender   Neurological:      General: No focal deficit present. Mental Status: She is alert and oriented to person, place, and time. Motor: No abnormal muscle tone. Coordination: Coordination normal.   Psychiatric:         Mood and Affect: Mood normal.         Behavior: Behavior normal.         Judgment: Judgment normal.         Diagnostic Study Results     Labs -   No results found for this or any previous visit (from the past 12 hour(s)). Radiologic Studies -   No orders to display         Medical Decision Making   I am the first provider for this patient. I reviewed the vital signs, available nursing notes, past medical history, past surgical history, family history and social history. Vital Signs-Reviewed the patient's vital signs. Patient Vitals for the past 12 hrs:   Temp Pulse Resp BP SpO2   06/29/20 1040 98.9 °F (37.2 °C) 81 14 121/87 100 %           Records Reviewed: Nursing Notes, Old Medical Records, Previous Radiology Studies and Previous Laboratory Studies    Provider Notes (Medical Decision Making): Abscess, folliculitis, cellulitis    ED Course:   Initial assessment performed.  The patients presenting problems have been discussed, and they are in agreement with the care plan formulated and outlined with them. I have encouraged them to ask questions as they arise throughout their visit. DISCHARGE NOTE:  The care plan has been outline with the patient and/or family, who verbally conveyed understanding and agreement. Available results have been reviewed. Patient and/or family understand the follow up plan as outlined and discharge instructions. Should their condition deterioration at any time after discharge the patient agrees to return, follow up sooner than outlined or seek medical assistance at the closest Emergency Room as soon as possible. Questions have been answered. Discharge instructions and educational information regarding the patient's diagnosis as well a list of reasons why the patient would want to seek immediate medical attention, should their condition change, were reviewed directly with the patient/family          PLAN:  1. Discharge Medication List as of 6/29/2020 12:05 PM      START taking these medications    Details   trimethoprim-sulfamethoxazole (Bactrim DS) 160-800 mg per tablet Take 1 Tab by mouth two (2) times a day for 7 days. , Print, Disp-14 Tab, R-0      ibuprofen (MOTRIN) 600 mg tablet Take 1 Tab by mouth every six (6) hours as needed for Pain for up to 15 doses. , Print, Disp-15 Tab, R-0           2. Follow-up Information     Follow up With Specialties Details Why Contact Info    Suzette Nguyen MD Obstetrics & Gynecology, Gynecology, Obstetrics   [address]  [phone]      [facility] EMERGENCY DEPT Emergency Medicine  If symptoms worsen [address]  [address] [facility]  [phone]        Return to ED if worse     Diagnosis     Clinical Impression:   1.  Left genital labial abscess

## 2020-07-01 ENCOUNTER — HOSPITAL ENCOUNTER (EMERGENCY)
Age: 25
Discharge: HOME | End: 2020-07-02
Attending: EMERGENCY MEDICINE | Admitting: EMERGENCY MEDICINE
Payer: COMMERCIAL

## 2020-07-01 DIAGNOSIS — L73.9 FOLLICULITIS: ICD-10-CM

## 2020-07-01 DIAGNOSIS — R59.0 INGUINAL LYMPHADENOPATHY: Primary | ICD-10-CM

## 2020-07-01 PROCEDURE — 99283 EMERGENCY DEPT VISIT LOW MDM: CPT

## 2020-07-01 PROCEDURE — 74011250637 HC RX REV CODE- 250/637: Performed by: EMERGENCY MEDICINE

## 2020-07-01 RX ORDER — SULFAMETHOXAZOLE AND TRIMETHOPRIM 800; 160 MG/1; MG/1
1 TABLET ORAL
Status: COMPLETED | OUTPATIENT
Start: 2020-07-01 | End: 2020-07-01

## 2020-07-01 RX ORDER — SULFAMETHOXAZOLE AND TRIMETHOPRIM 800; 160 MG/1; MG/1
1 TABLET ORAL 2 TIMES DAILY
Qty: 14 TAB | Refills: 0 | Status: SHIPPED | OUTPATIENT
Start: 2020-07-01 | End: 2020-07-08

## 2020-07-01 RX ADMIN — SULFAMETHOXAZOLE AND TRIMETHOPRIM 1 TABLET: 800; 160 TABLET ORAL at 23:43

## 2020-07-02 VITALS
TEMPERATURE: 98.3 F | HEART RATE: 66 BPM | RESPIRATION RATE: 17 BRPM | WEIGHT: 113.76 LBS | SYSTOLIC BLOOD PRESSURE: 122 MMHG | BODY MASS INDEX: 20.16 KG/M2 | HEIGHT: 63 IN | DIASTOLIC BLOOD PRESSURE: 70 MMHG | OXYGEN SATURATION: 100 %

## 2020-07-02 NOTE — ED NOTES
Pt states that she was seen in the ED on Monday for a bump on her labia area. Pt was dx with an abscess and prescribed an antibiotic (bactrim) but was unable to retreive the med due to insurance issues. Pt comes back to the ED today due to another bump located under her skin on the left upper side of her pelvic region. Pt states the pain radiates down her left leg. Pt states she has a white vaginal discharge. Pt denies sob chest pain, fever, chill,     Call bell within reach. monitor x2.

## 2020-07-02 NOTE — ED NOTES
Peter Leigh MD reviewed discharge instructions with the patient. The patient verbalized understanding. All questions and concerns were addressed. The patient declined a wheelchair and is discharged ambulatory in the care of family members with instructions and prescriptions in hand. Pt is alert and oriented x 4. Respirations are clear and unlabored.

## 2020-07-02 NOTE — ED PROVIDER NOTES
EMERGENCY DEPARTMENT HISTORY AND PHYSICAL EXAM      Date: 7/1/2020  Patient Name: Natalia Buchanan    History of Presenting Illness     Chief Complaint   Patient presents with    Vaginal Pain     Pt was seen in ED several days ago for an \"infected ingrown hair\" on her vagina. Pt states pain is getting worse and she formed a lump in her skin on another part of her vaginal area. History Provided By: Patient    HPI: Natalia Buchanan, 26 y.o. female  With past medical history of anxiety, GERD presenting today with inguinal pain on the left side. The patient notes that she was seen in the ED for folliculitis on her labia. She states that she noticed a swelling in her inguinal area on the left side today that is painful. She seen her GYN as an outpatient and they did a culture of the wound, and also sent vaginal swabs. The patient states that she is been having some increase in her vaginal discharge, but no other symptoms. No fevers, chills. The patient has not taken her Bactrim because she was unable to get this prescription filled for financial reasons. There are no other complaints, changes, or physical findings at this time. PCP: None    No current facility-administered medications on file prior to encounter. Current Outpatient Medications on File Prior to Encounter   Medication Sig Dispense Refill    ibuprofen (MOTRIN) 600 mg tablet Take 1 Tab by mouth every six (6) hours as needed for Pain for up to 15 doses. 15 Tab 0       Past History     Past Medical History:  Past Medical History:   Diagnosis Date    Chlamydia     Endocrine disease     hypoglycemia    Gastrointestinal disorder     GERD    Hypokalemia     Ill-defined condition     kidney failure    Kidney disease     \"kidney failure a few years ago\"    Panic attacks     Psychiatric disorder     anxiety, panic disorder    Trichomonal infection        Past Surgical History:  History reviewed. No pertinent surgical history.     Family History:  Family History   Problem Relation Age of Onset    Migraines Mother     Hypertension Father     Hypertension Maternal Uncle     Diabetes Paternal Aunt     Diabetes Paternal Uncle     Hypertension Paternal Grandfather     Stroke Paternal Grandfather        Social History:  Social History     Tobacco Use    Smoking status: Never Smoker    Smokeless tobacco: Never Used   Substance Use Topics    Alcohol use: No    Drug use: No       Allergies: Allergies   Allergen Reactions    Penicillins Hives    Vancomycin Itching and Other (comments)     Lips went numb         Review of Systems   Constitutional: No  fever  Skin: No  rash  HEENT: No  nasal congestion  Resp: No cough  CV: No chest pain  GI: No vomiting  : No dysuria  MSK: No joint pain  Neuro: No numbness  Psych: No suicidal      Physical Exam     Patient Vitals for the past 12 hrs:   Temp Pulse Resp BP SpO2   07/02/20 0009 98.3 °F (36.8 °C) 66 17 122/70 100 %   07/01/20 2235 98.9 °F (37.2 °C) 65 17 120/71 99 %       General: alert, No acute distress  Eyes: EOMI, normal conjunctiva  ENT: moist mucous membranes. Neck: Active, full ROM of neck. Skin: No rashes. no jaundice              Lungs: Equal chest expansion. no respiratory distress. Heart: regular rate     no peripheral edema    Abd:  non distended soft  : Mobile inguinal lymph node on the left side 1 cm x 1 cm mildly tender to palpation without fluctuance, one small area of folliculitis on the left labia  Back: Full ROM  MSK: Full, active ROM in all 4 extremities. Neuro: alert  Person, Place, Time and Situation; normal speech;   Psych: Cooperative with exam; Appropriate mood and affect             Diagnostic Study Results     Labs -   No results found for this or any previous visit (from the past 12 hour(s)).     Radiologic Studies -   No orders to display     CT Results  (Last 48 hours)    None        CXR Results  (Last 48 hours)    None          Medical Decision Making   I am the first provider for this patient. I reviewed the vital signs, available nursing notes, past medical history, past surgical history, family history and social history. Provider Notes (Medical Decision Making):     Differential Diagnosis: Inguinal lymphadenopathy, UTI, STD, folliculitis. Initial Plan: The patient has already had wound culture, vaginal swabs, urine and laboratory studies with her GYN today. She does have lymphadenopathy, and I will treat her with Tylenol and Motrin for this. I told her to  the Bactrim for the folliculitis. The patient understands and wishes to follow-up with her GYN regarding the vaginal discharge, and the cultures, swabs, and lab studies she had done. I feel this is reasonable. Patient ultimately is discharged with return precautions. Harshil Zuniga MD, am the attending of record for this patient encounter. Dispo: Discharged. The patient has been re-evaluated and is ready for discharge. Reviewed available results with patient. Counseled patient on diagnosis and care plan. Patient has expressed understanding, and all questions have been answered. Patient agrees with plan and agrees to follow up as recommended, or to return to the ED if their symptoms worsen. Discharge instructions have been provided and explained to the patient, along with reasons to return to the ED. PLAN:  Discharge Medication List as of 7/1/2020 11:50 PM      CONTINUE these medications which have CHANGED    Details   trimethoprim-sulfamethoxazole (Bactrim DS) 160-800 mg per tablet Take 1 Tab by mouth two (2) times a day for 7 days. , Print, Disp-14 Tab, R-0         CONTINUE these medications which have NOT CHANGED    Details   ibuprofen (MOTRIN) 600 mg tablet Take 1 Tab by mouth every six (6) hours as needed for Pain for up to 15 doses. , Print, Disp-15 Tab, R-0         1.   2.     Follow-up Information     Follow up With Specialties Details Why Contact Info    OBGYN   As planned 3.   Return to ED if worse       Diagnosis     Clinical Impression:   1. Inguinal lymphadenopathy    2. Folliculitis        Attestations:    Julian Manning MD    Please note that this dictation was completed with [employer], the computer voice recognition software. Quite often unanticipated grammatical, syntax, homophones, and other interpretive errors are inadvertently transcribed by the computer software. Please disregard these errors. Please excuse any errors that have escaped final proofreading. Thank you.

## 2020-07-09 ENCOUNTER — HOSPITAL ENCOUNTER (INPATIENT)
Age: 25
LOS: 3 days | Discharge: HOME | DRG: 051 | End: 2020-07-13
Attending: EMERGENCY MEDICINE | Admitting: INTERNAL MEDICINE
Payer: COMMERCIAL

## 2020-07-09 DIAGNOSIS — G03.9 MENINGITIS: Primary | ICD-10-CM

## 2020-07-09 DIAGNOSIS — N30.00 ACUTE CYSTITIS WITHOUT HEMATURIA: ICD-10-CM

## 2020-07-09 DIAGNOSIS — G44.52 NEW DAILY PERSISTENT HEADACHE: ICD-10-CM

## 2020-07-09 DIAGNOSIS — F41.9 ANXIETY: ICD-10-CM

## 2020-07-09 DIAGNOSIS — E87.6 HYPOKALEMIA: ICD-10-CM

## 2020-07-09 DIAGNOSIS — E16.2 HYPOGLYCEMIA: ICD-10-CM

## 2020-07-09 DIAGNOSIS — R50.81 FEVER IN OTHER DISEASES: ICD-10-CM

## 2020-07-09 DIAGNOSIS — B00.3: ICD-10-CM

## 2020-07-09 LAB
ALBUMIN SERPL-MCNC: 4.5 G/DL (ref 3.5–5)
ALBUMIN/GLOB SERPL: 1 {RATIO} (ref 1.1–2.2)
ALP SERPL-CCNC: 48 U/L (ref 45–117)
ALT SERPL-CCNC: 17 U/L (ref 12–78)
ANION GAP SERPL CALC-SCNC: 4 MMOL/L (ref 5–15)
APPEARANCE UR: CLEAR
AST SERPL-CCNC: 9 U/L (ref 15–37)
BACTERIA URNS QL MICRO: NEGATIVE /HPF
BASOPHILS # BLD: 0 K/UL (ref 0–0.1)
BASOPHILS NFR BLD: 1 % (ref 0–1)
BILIRUB SERPL-MCNC: 0.5 MG/DL (ref 0.2–1)
BILIRUB UR QL: NEGATIVE
BUN SERPL-MCNC: 9 MG/DL (ref 6–20)
BUN/CREAT SERPL: 10 (ref 12–20)
CALCIUM SERPL-MCNC: 9.1 MG/DL (ref 8.5–10.1)
CHLORIDE SERPL-SCNC: 103 MMOL/L (ref 97–108)
CK SERPL-CCNC: 63 U/L (ref 26–192)
CO2 SERPL-SCNC: 28 MMOL/L (ref 21–32)
COLOR UR: ABNORMAL
COMMENT, HOLDF: NORMAL
CREAT SERPL-MCNC: 0.93 MG/DL (ref 0.55–1.02)
DIFFERENTIAL METHOD BLD: ABNORMAL
EOSINOPHIL # BLD: 0 K/UL (ref 0–0.4)
EOSINOPHIL NFR BLD: 0 % (ref 0–7)
EPITH CASTS URNS QL MICRO: ABNORMAL /LPF
ERYTHROCYTE [DISTWIDTH] IN BLOOD BY AUTOMATED COUNT: 11.7 % (ref 11.5–14.5)
GLOBULIN SER CALC-MCNC: 4.6 G/DL (ref 2–4)
GLUCOSE CSF-MCNC: 60 MG/DL (ref 40–70)
GLUCOSE SERPL-MCNC: 105 MG/DL (ref 65–100)
GLUCOSE UR STRIP.AUTO-MCNC: NEGATIVE MG/DL
HCG UR QL: NEGATIVE
HCT VFR BLD AUTO: 40.1 % (ref 35–47)
HGB BLD-MCNC: 13.3 G/DL (ref 11.5–16)
HGB UR QL STRIP: NEGATIVE
HYALINE CASTS URNS QL MICRO: ABNORMAL /LPF (ref 0–5)
IMM GRANULOCYTES # BLD AUTO: 0 K/UL (ref 0–0.04)
IMM GRANULOCYTES NFR BLD AUTO: 0 % (ref 0–0.5)
KETONES UR QL STRIP.AUTO: NEGATIVE MG/DL
LEUKOCYTE ESTERASE UR QL STRIP.AUTO: ABNORMAL
LYMPHOCYTES # BLD: 1.9 K/UL (ref 0.8–3.5)
LYMPHOCYTES NFR BLD: 26 % (ref 12–49)
MCH RBC QN AUTO: 29.2 PG (ref 26–34)
MCHC RBC AUTO-ENTMCNC: 33.2 G/DL (ref 30–36.5)
MCV RBC AUTO: 88.1 FL (ref 80–99)
MONOCYTES # BLD: 0.3 K/UL (ref 0–1)
MONOCYTES NFR BLD: 4 % (ref 5–13)
NEUTS SEG # BLD: 5.2 K/UL (ref 1.8–8)
NEUTS SEG NFR BLD: 69 % (ref 32–75)
NITRITE UR QL STRIP.AUTO: NEGATIVE
NRBC # BLD: 0 K/UL (ref 0–0.01)
NRBC BLD-RTO: 0 PER 100 WBC
PH UR STRIP: 7 [PH] (ref 5–8)
PLATELET # BLD AUTO: 271 K/UL (ref 150–400)
PMV BLD AUTO: 10.6 FL (ref 8.9–12.9)
POTASSIUM SERPL-SCNC: 3.7 MMOL/L (ref 3.5–5.1)
PROCALCITONIN SERPL-MCNC: 0.07 NG/ML
PROT CSF-MCNC: 88 MG/DL (ref 15–45)
PROT SERPL-MCNC: 9.1 G/DL (ref 6.4–8.2)
PROT UR STRIP-MCNC: NEGATIVE MG/DL
RBC # BLD AUTO: 4.55 M/UL (ref 3.8–5.2)
RBC #/AREA URNS HPF: ABNORMAL /HPF (ref 0–5)
SAMPLES BEING HELD,HOLD: NORMAL
SODIUM SERPL-SCNC: 135 MMOL/L (ref 136–145)
SP GR UR REFRACTOMETRY: 1.01 (ref 1–1.03)
TUBE # CSF: 1
TUBE # CSF: 1
UA: UC IF INDICATED,UAUC: ABNORMAL
UROBILINOGEN UR QL STRIP.AUTO: 0.2 EU/DL (ref 0.2–1)
WBC # BLD AUTO: 7.6 K/UL (ref 3.6–11)
WBC URNS QL MICRO: ABNORMAL /HPF (ref 0–4)

## 2020-07-09 PROCEDURE — 89050 BODY FLUID CELL COUNT: CPT

## 2020-07-09 PROCEDURE — 85025 COMPLETE CBC W/AUTO DIFF WBC: CPT

## 2020-07-09 PROCEDURE — 81025 URINE PREGNANCY TEST: CPT

## 2020-07-09 PROCEDURE — 75810000133 HC LUMBAR PUNCTURE

## 2020-07-09 PROCEDURE — 99285 EMERGENCY DEPT VISIT HI MDM: CPT

## 2020-07-09 PROCEDURE — 36415 COLL VENOUS BLD VENIPUNCTURE: CPT

## 2020-07-09 PROCEDURE — 74011250637 HC RX REV CODE- 250/637: Performed by: EMERGENCY MEDICINE

## 2020-07-09 PROCEDURE — 82550 ASSAY OF CK (CPK): CPT

## 2020-07-09 PROCEDURE — 84145 PROCALCITONIN (PCT): CPT

## 2020-07-09 PROCEDURE — 74011250636 HC RX REV CODE- 250/636: Performed by: EMERGENCY MEDICINE

## 2020-07-09 PROCEDURE — 84157 ASSAY OF PROTEIN OTHER: CPT

## 2020-07-09 PROCEDURE — 81001 URINALYSIS AUTO W/SCOPE: CPT

## 2020-07-09 PROCEDURE — 77030018870 HC TY PARCNT BD -B

## 2020-07-09 PROCEDURE — 77030003666 HC NDL SPINAL BD -A

## 2020-07-09 PROCEDURE — 80053 COMPREHEN METABOLIC PANEL: CPT

## 2020-07-09 PROCEDURE — 009U3ZX DRAINAGE OF SPINAL CANAL, PERCUTANEOUS APPROACH, DIAGNOSTIC: ICD-10-PCS | Performed by: EMERGENCY MEDICINE

## 2020-07-09 PROCEDURE — 82945 GLUCOSE OTHER FLUID: CPT

## 2020-07-09 PROCEDURE — 87205 SMEAR GRAM STAIN: CPT

## 2020-07-09 RX ORDER — ACETAMINOPHEN 325 MG/1
975 TABLET ORAL
Status: COMPLETED | OUTPATIENT
Start: 2020-07-09 | End: 2020-07-09

## 2020-07-09 RX ORDER — ACETAMINOPHEN 325 MG/1
650 TABLET ORAL
Status: DISCONTINUED | OUTPATIENT
Start: 2020-07-09 | End: 2020-07-13 | Stop reason: HOSPADM

## 2020-07-09 RX ORDER — SODIUM CHLORIDE 9 MG/ML
1000 INJECTION, SOLUTION INTRAVENOUS ONCE
Status: COMPLETED | OUTPATIENT
Start: 2020-07-09 | End: 2020-07-09

## 2020-07-09 RX ADMIN — ACETAMINOPHEN 975 MG: 325 TABLET ORAL at 19:26

## 2020-07-09 RX ADMIN — SODIUM CHLORIDE 1000 ML: 900 INJECTION, SOLUTION INTRAVENOUS at 19:25

## 2020-07-10 ENCOUNTER — APPOINTMENT (OUTPATIENT)
Dept: CT IMAGING | Age: 25
DRG: 051 | End: 2020-07-10
Attending: EMERGENCY MEDICINE
Payer: COMMERCIAL

## 2020-07-10 PROBLEM — G03.9 MENINGITIS: Status: ACTIVE | Noted: 2020-07-10

## 2020-07-10 LAB
APPEARANCE CSF: CLEAR
COLOR CSF: COLORLESS
COMMENT, HOLDF: NORMAL
CRYPTOCOCCUS NEOFORMANS/GATTII, CRNEOG: NOT DETECTED
CYTOMEGALOVIRUS, CYMEG: NOT DETECTED
ENTEROVIRUS, ENTVIR: NOT DETECTED
ESCHERICHIA COLI K1, ECK1: NOT DETECTED
HAEMOPHILUS INFLUENZAE, HAEFLU: NOT DETECTED
HCG UR QL: NEGATIVE
HERPES SIMPLEX VIRUS 2, HSIMV2: DETECTED
HSV1 DNA CSF QL NAA+PROBE: NOT DETECTED
HUMAN HERPESVIRUS 6, HUHV6: NOT DETECTED
HUMAN PARECHOVIRUS, HUPARV: NOT DETECTED
LISTERIA MONOCYTOGENES, LISMON: NOT DETECTED
LYMPHOCYTES NFR CSF MANUAL: 83 % (ref 28–96)
MACROPHAGES NFR CSF MANUAL: 3 %
MONOCYTES NFR CSF MANUAL: 8 % (ref 16–56)
MONONUC CELLS NFR CSF MANUAL: 2 %
NEISSERIA MENINGITIDIS, NEIMEN: NOT DETECTED
NEUTROPHILS NFR CSF MANUAL: 4 % (ref 0–7)
RBC # CSF: 0 /CU MM
SAMPLES BEING HELD,HOLD: NORMAL
STREPTOCOCCUS AGALACTIAE, SAGA: NOT DETECTED
STREPTOCOCCUS PNEUMONIAE, STRPNE: NOT DETECTED
TUBE # CSF: 3
VARICELLA ZOSTER VIRUS, VAZOVI: NOT DETECTED
WBC # CSF: 850 /CU MM (ref 0–5)

## 2020-07-10 PROCEDURE — 96365 THER/PROPH/DIAG IV INF INIT: CPT

## 2020-07-10 PROCEDURE — 74011000258 HC RX REV CODE- 258: Performed by: INTERNAL MEDICINE

## 2020-07-10 PROCEDURE — 74011250636 HC RX REV CODE- 250/636: Performed by: EMERGENCY MEDICINE

## 2020-07-10 PROCEDURE — 65660000000 HC RM CCU STEPDOWN

## 2020-07-10 PROCEDURE — 87483 CNS DNA AMP PROBE TYPE 12-25: CPT

## 2020-07-10 PROCEDURE — 86788 WEST NILE VIRUS AB IGM: CPT

## 2020-07-10 PROCEDURE — 74011250637 HC RX REV CODE- 250/637: Performed by: EMERGENCY MEDICINE

## 2020-07-10 PROCEDURE — 87635 SARS-COV-2 COVID-19 AMP PRB: CPT

## 2020-07-10 PROCEDURE — 74011000258 HC RX REV CODE- 258: Performed by: EMERGENCY MEDICINE

## 2020-07-10 PROCEDURE — 70450 CT HEAD/BRAIN W/O DYE: CPT

## 2020-07-10 PROCEDURE — 74011250636 HC RX REV CODE- 250/636: Performed by: INTERNAL MEDICINE

## 2020-07-10 PROCEDURE — 36415 COLL VENOUS BLD VENIPUNCTURE: CPT

## 2020-07-10 PROCEDURE — 86695 HERPES SIMPLEX TYPE 1 TEST: CPT

## 2020-07-10 RX ORDER — METRONIDAZOLE 250 MG/1
500 TABLET ORAL
Status: COMPLETED | OUTPATIENT
Start: 2020-07-10 | End: 2020-07-10

## 2020-07-10 RX ORDER — LINEZOLID 2 MG/ML
600 INJECTION, SOLUTION INTRAVENOUS EVERY 12 HOURS
Status: DISCONTINUED | OUTPATIENT
Start: 2020-07-10 | End: 2020-07-10

## 2020-07-10 RX ORDER — SODIUM CHLORIDE 9 MG/ML
100 INJECTION, SOLUTION INTRAVENOUS CONTINUOUS
Status: DISCONTINUED | OUTPATIENT
Start: 2020-07-10 | End: 2020-07-13 | Stop reason: HOSPADM

## 2020-07-10 RX ADMIN — ACYCLOVIR SODIUM 500 MG: 50 INJECTION, SOLUTION INTRAVENOUS at 02:43

## 2020-07-10 RX ADMIN — ACYCLOVIR SODIUM 500 MG: 50 INJECTION, SOLUTION INTRAVENOUS at 10:26

## 2020-07-10 RX ADMIN — ACETAMINOPHEN 650 MG: 325 TABLET ORAL at 16:40

## 2020-07-10 RX ADMIN — SODIUM CHLORIDE 100 ML/HR: 900 INJECTION, SOLUTION INTRAVENOUS at 02:42

## 2020-07-10 RX ADMIN — METRONIDAZOLE 500 MG: 250 TABLET ORAL at 00:38

## 2020-07-10 RX ADMIN — CEFTRIAXONE SODIUM 2 G: 2 INJECTION, POWDER, FOR SOLUTION INTRAMUSCULAR; INTRAVENOUS at 13:16

## 2020-07-10 RX ADMIN — SODIUM CHLORIDE 100 ML/HR: 900 INJECTION, SOLUTION INTRAVENOUS at 17:15

## 2020-07-10 RX ADMIN — ACETAMINOPHEN 650 MG: 325 TABLET ORAL at 10:25

## 2020-07-10 RX ADMIN — LINEZOLID 600 MG: 600 INJECTION, SOLUTION INTRAVENOUS at 04:47

## 2020-07-10 RX ADMIN — CEFTRIAXONE SODIUM 2 G: 2 INJECTION, POWDER, FOR SOLUTION INTRAMUSCULAR; INTRAVENOUS at 00:21

## 2020-07-10 NOTE — PROGRESS NOTES
TRANSFER - IN REPORT:    Verbal report received from acrole(name) on [facility]  being received from neuro (unit) for change in patient condition(Covid rule out)      Report consisted of patients Situation, Background, Assessment and   Recommendations(SBAR). Information from the following report(s) SBAR, Kardex, Intake/Output, MAR, Recent Results and Quality Measures was reviewed with the receiving nurse. Opportunity for questions and clarification was provided. Assessment completed upon patients arrival to unit and care assumed. [phone] Lab called and patient is positive for herpes simplex virus 2.  Paged Dr. Gagandeep Sexton that the medicine acyclovir was not in tube station or pyxis

## 2020-07-10 NOTE — CONSULTS
IP CONSULT TO NEUROLOGY  Consult performed by: Miguel Vallejo MD  Consult ordered by: Josh Acosta MD            NEUROLOGY CONSULT    NAME Aracely Faustin AGE 26 y.o. MRN [id number]  1995     REQUESTING PHYSICIAN: Lina Quesada MD      CHIEF COMPLAINT: Headache     This is a 26 y.o. right-handed female with medical history of anxiety. For the past 2 days she has been suffering headaches and malaise. She has been treated with Bactrim for bacterial vaginosis. Reported neck pain and photophobia. Lumbar puncture was performed in the emergency department and revealed a lymphocytosis. She is sexually active reports recent negative screening for STDs        ASSESSMENT AND PLAN     1. Meningitis  Viral meningitis vs mollaret's   Continue acyclovir    2. Headaches  Improving.             ALLERGIES:  Penicillins and Vancomycin     Current Facility-Administered Medications   Medication Dose Route Frequency    acyclovir (ZOVIRAX) 500 mg in 0.9% sodium chloride 100 mL IVPB  10 mg/kg IntraVENous Q8H    0.9% sodium chloride infusion  100 mL/hr IntraVENous CONTINUOUS    cefTRIAXone (ROCEPHIN) 2 g in 0.9% sodium chloride (MBP/ADV) 50 mL  2 g IntraVENous Q12H    acetaminophen (TYLENOL) tablet 650 mg  650 mg Oral Q6H PRN       Past Medical History:   Diagnosis Date    Chlamydia     Endocrine disease     hypoglycemia    Gastrointestinal disorder     GERD    Hypokalemia     Ill-defined condition     kidney failure    Kidney disease     \"kidney failure a few years ago\"    Panic attacks     Psychiatric disorder     anxiety, panic disorder    Trichomonal infection        Social History     Tobacco Use    Smoking status: Never Smoker    Smokeless tobacco: Never Used   Substance Use Topics    Alcohol use: No       Family History   Problem Relation Age of Onset    Migraines Mother     Hypertension Father     Hypertension Maternal Uncle     Diabetes Paternal Aunt     Diabetes Paternal Chas Hals Hypertension Paternal Grandfather     Stroke Paternal Grandfather      Review of Systems   Constitutional: Positive for fever and malaise/fatigue. Negative for chills. HENT: Negative for ear pain. Eyes: Positive for photophobia. Negative for pain and discharge. Respiratory: Negative for cough and hemoptysis. Cardiovascular: Negative for chest pain and claudication. Gastrointestinal: Negative for constipation and diarrhea. Genitourinary: Negative for flank pain and hematuria. Musculoskeletal: Positive for myalgias and neck pain. Negative for back pain. Skin: Negative for itching and rash. Neurological: Positive for headaches. Endo/Heme/Allergies: Negative for environmental allergies. Does not bruise/bleed easily. Psychiatric/Behavioral: Negative for depression and hallucinations. Visit Vitals  /64   Pulse 78   Temp 98.6 °F (37 °C)   Resp 16   Ht 5' 3\" (1.6 m)   Wt 112 lb 14 oz (51.2 kg)   SpO2 99%   BMI 20.00 kg/m²      General: Well developed, well nourished. Acutely ill   Head: Normocephalic, atraumatic, anicteric sclera   Neck Normal ROM, No thyromegally   Lungs:  Clear to auscultation bilaterally   Cardiac: Regular rate and rhythm with no murmurs. Abd: Bowel sounds were audible. Ext: No pedal edema   Skin: Supple no rash     NeurologicExam:  Mental Status: Alert and oriented to person place and time   Speech: Fluent no aphasia or dysarthria. Cranial Nerves:  II - XII Intact   Motor:  Full and symmetric strength of upper and lower extremities  Normal bulk and tone. Reflexes:   Deep tendon reflexes 2+/4 and symmetric. Sensory:   Symmetric and intact with no deficits    Gait:  deferred. Tremor:   No tremor noted. Cerebellar:  Coordination intact. Neurovascular: No carotid bruits.  No JVD       REVIEWED IMAGING:  CT:  Results from Hospital Encounter encounter on 07/09/20   CT HEAD WO CONT    Narrative EXAM:  CT HEAD WO CONT    INDICATION: Headache, fever, enteritis. COMPARISON: CT 1/28/2017. TECHNIQUE: Noncontrast head CT. Coronal and sagittal reformats. CT dose  reduction was achieved through use of a standardized protocol tailored for this  examination and automatic exposure control for dose modulation. FINDINGS: The ventricles and sulci are age-appropriate without hydrocephalus. There is no mass effect or midline shift. There is no intracranial hemorrhage or  extra-axial fluid collection. There is no abnormal parenchymal attenuation. The  gray-white matter differentiation is maintained. The basal cisterns are patent. The osseous structures are intact. The visualized paranasal sinuses and mastoid  air cells are clear. Impression IMPRESSION:     No acute intracranial abnormality.           REVIEWED LABS:  Lab Results   Component Value Date/Time    WBC 7.6 07/09/2020 05:13 PM    HCT 40.1 07/09/2020 05:13 PM    HGB 13.3 07/09/2020 05:13 PM    PLATELET 053 96/63/1009 05:13 PM     Lab Results   Component Value Date/Time    Sodium 135 (L) 07/09/2020 05:13 PM    Potassium 3.7 07/09/2020 05:13 PM    Chloride 103 07/09/2020 05:13 PM    CO2 28 07/09/2020 05:13 PM    Glucose 105 (H) 07/09/2020 05:13 PM    BUN 9 07/09/2020 05:13 PM    Creatinine 0.93 07/09/2020 05:13 PM    Calcium 9.1 07/09/2020 05:13 PM       Lab Results   Component Value Date/Time    Hemoglobin A1c <3.5 (L) 03/10/2019 04:47 PM

## 2020-07-10 NOTE — PROGRESS NOTES
Pt. Examined  And [facility] saw the pt. And per them likely Viral meningitis vs mollaret's  And rec. Continue acyclovir. Per pt. Her head is improving Headaches.  PUI to r/o covid19 pending test result

## 2020-07-10 NOTE — H&P
Hospitalist Admission Note    NAME: Trell Menjivar   :  1995   MRN:  [id number]     Date/Time:  7/10/2020 5:41 AM    Patient PCP: None  ______________________________________________________________________  Given the patient's current clinical presentation, I have a high level of concern for decompensation if discharged from the emergency department. Complex decision making was performed, which includes reviewing the patient's available past medical records, laboratory results, and x-ray films. My assessment of this patient's clinical condition and my plan of care is as follows. Assessment / Plan:  Headache with neck stiffness most likely secondary to meningitis  Admit patient to telemetry  Start patient on broad-spectrum antibiotic Rocephin linezolid and acyclovir  Follow-up spinal tap results   Neurology consultation  IV fluid              Code Status: Full   Surrogate Decision Maker:    DVT Prophylaxis:   GI Prophylaxis: not indicated          Subjective:   CHIEF COMPLAINT: headache     HISTORY OF PRESENT ILLNESS:     26years old female from home with past medical history significant for renal disease, psych disorders, GERD presented to the hospital for evaluation of generalized weakness associated with headache and neck pain and stiffness started about 4 days ago associated with fever, patient denies any shortness of breath denies any rash, work in ED show no significant acute abnormality, head CT scan was done show no acute abnormality. We were asked to admit for work up and evaluation of the above problems.      Past Medical History:   Diagnosis Date    Chlamydia     Endocrine disease     hypoglycemia    Gastrointestinal disorder     GERD    Hypokalemia     Ill-defined condition     kidney failure    Kidney disease     \"kidney failure a few years ago\"    Panic attacks     Psychiatric disorder     anxiety, panic disorder    Trichomonal infection         No past surgical history on file. Social History     Tobacco Use    Smoking status: Never Smoker    Smokeless tobacco: Never Used   Substance Use Topics    Alcohol use: No        Family History   Problem Relation Age of Onset    Migraines Mother     Hypertension Father     Hypertension Maternal Uncle     Diabetes Paternal Aunt     Diabetes Paternal Uncle     Hypertension Paternal Grandfather     Stroke Paternal Grandfather      Allergies   Allergen Reactions    Penicillins Hives    Vancomycin Itching and Other (comments)     Lips went numb        Prior to Admission medications    Medication Sig Start Date End Date Taking? Authorizing Provider   ibuprofen (MOTRIN) 600 mg tablet Take 1 Tab by mouth every six (6) hours as needed for Pain for up to 15 doses. 6/29/20   JOVANNY Antonio       REVIEW OF SYSTEMS:     I am not able to complete the review of systems because:    The patient is intubated and sedated    The patient has altered mental status due to his acute medical problems    The patient has baseline aphasia from prior stroke(s)    The patient has baseline dementia and is not reliable historian    The patient is in acute medical distress and unable to provide information           Total of 12 systems reviewed as follows:       POSITIVE= underlined text  Negative = text not underlined  General:  fever, chills, sweats, generalized weakness, weight loss/gain,      loss of appetite   Eyes:    blurred vision, eye pain, loss of vision, double vision  ENT:    rhinorrhea, pharyngitis   Respiratory:   cough, sputum production, SOB, PURI, wheezing, pleuritic pain   Cardiology:   chest pain, palpitations, orthopnea, PND, edema, syncope   Gastrointestinal:  abdominal pain , N/V, diarrhea, dysphagia, constipation, bleeding   Genitourinary:  frequency, urgency, dysuria, hematuria, incontinence   Muskuloskeletal :  arthralgia, myalgia, back pain  Hematology:  easy bruising, nose or gum bleeding, lymphadenopathy Dermatological: rash, ulceration, pruritis, color change / jaundice  Endocrine:   hot flashes or polydipsia   Neurological:  headache, dizziness, confusion, focal weakness, paresthesia,neck stiffness      Speech difficulties, memory loss, gait difficulty  Psychological: Feelings of anxiety, depression, agitation    Objective:   VITALS:    Visit Vitals  BP 99/58 (BP 1 Location: Left arm, BP Patient Position: At rest)   Pulse 65   Temp 98 °F (36.7 °C)   Resp 14   Ht 5' 3\" (1.6 m)   Wt 51.2 kg (112 lb 14 oz)   SpO2 99%   BMI 20.00 kg/m²       PHYSICAL EXAM:    General:    Alert, cooperative, no distress, appears stated age. HEENT: Atraumatic, anicteric sclerae, pink conjunctivae     No oral ulcers, mucosa moist, throat clear, dentition fair  Neck:  Supple, symmetrical,  thyroid: non tender  Lungs:   Clear to auscultation bilaterally. No Wheezing or Rhonchi. No rales. Chest wall:  No tenderness  No Accessory muscle use. Heart:   Regular  rhythm,  No  murmur   No edema  Abdomen:   Soft, non-tender. Not distended. Bowel sounds normal  Extremities: No cyanosis. No clubbing,      Skin turgor normal, Capillary refill normal, Radial dial pulse 2+  Skin:     Not pale. Not Jaundiced  No rashes   Psych:  Good insight. Not depressed. Not anxious or agitated. Neurologic: EOMs intact. No facial asymmetry. No aphasia or slurred speech. Symmetrical strength, Sensation grossly intact. Alert and oriented X 4.  Neck stiffness     _______________________________________________________________________  Care Plan discussed with:    Comments   Patient y    Family      RN y    Care Manager                    Consultant:      _______________________________________________________________________  Expected  Disposition:   Home with Family y   HH/PT/OT/RN    SNF/LTC    OSKAR    ________________________________________________________________________  TOTAL TIME:  61  Minutes    Critical Care Provided     Minutes non procedure based      Comments    y Reviewed previous records   >50% of visit spent in counseling and coordination of care y Discussion with patient and/or family and questions answered       ________________________________________________________________________  Signed: Brett Jaramillo MD    Procedures: see electronic medical records for all procedures/Xrays and details which were not copied into this note but were reviewed prior to creation of Plan. LAB DATA REVIEWED:    Recent Results (from the past 24 hour(s))   CBC WITH AUTOMATED DIFF    Collection Time: 07/09/20  5:13 PM   Result Value Ref Range    WBC 7.6 3.6 - 11.0 K/uL    RBC 4.55 3.80 - 5.20 M/uL    HGB 13.3 11.5 - 16.0 g/dL    HCT 40.1 35.0 - 47.0 %    MCV 88.1 80.0 - 99.0 FL    MCH 29.2 26.0 - 34.0 PG    MCHC 33.2 30.0 - 36.5 g/dL    RDW 11.7 11.5 - 14.5 %    PLATELET 837 709 - 854 K/uL    MPV 10.6 8.9 - 12.9 FL    NRBC 0.0 0  WBC    ABSOLUTE NRBC 0.00 0.00 - 0.01 K/uL    NEUTROPHILS 69 32 - 75 %    LYMPHOCYTES 26 12 - 49 %    MONOCYTES 4 (L) 5 - 13 %    EOSINOPHILS 0 0 - 7 %    BASOPHILS 1 0 - 1 %    IMMATURE GRANULOCYTES 0 0.0 - 0.5 %    ABS. NEUTROPHILS 5.2 1.8 - 8.0 K/UL    ABS. LYMPHOCYTES 1.9 0.8 - 3.5 K/UL    ABS. MONOCYTES 0.3 0.0 - 1.0 K/UL    ABS. EOSINOPHILS 0.0 0.0 - 0.4 K/UL    ABS. BASOPHILS 0.0 0.0 - 0.1 K/UL    ABS. IMM.  GRANS. 0.0 0.00 - 0.04 K/UL    DF AUTOMATED     METABOLIC PANEL, COMPREHENSIVE    Collection Time: 07/09/20  5:13 PM   Result Value Ref Range    Sodium 135 (L) 136 - 145 mmol/L    Potassium 3.7 3.5 - 5.1 mmol/L    Chloride 103 97 - 108 mmol/L    CO2 28 21 - 32 mmol/L    Anion gap 4 (L) 5 - 15 mmol/L    Glucose 105 (H) 65 - 100 mg/dL    BUN 9 6 - 20 MG/DL    Creatinine 0.93 0.55 - 1.02 MG/DL    BUN/Creatinine ratio 10 (L) 12 - 20      GFR est AA >60 >60 ml/min/1.73m2    GFR est non-AA >60 >60 ml/min/1.73m2    Calcium 9.1 8.5 - 10.1 MG/DL    Bilirubin, total 0.5 0.2 - 1.0 MG/DL    ALT (SGPT) 17 12 - 78 U/L    AST (SGOT) 9 (L) 15 - 37 U/L    Alk.  phosphatase 48 45 - 117 U/L    Protein, total 9.1 (H) 6.4 - 8.2 g/dL    Albumin 4.5 3.5 - 5.0 g/dL    Globulin 4.6 (H) 2.0 - 4.0 g/dL    A-G Ratio 1.0 (L) 1.1 - 2.2     CK W/ REFLX CKMB    Collection Time: 07/09/20  5:13 PM   Result Value Ref Range    CK 63 26 - 192 U/L   HCG URINE, QL. - POC    Collection Time: 07/09/20  5:22 PM   Result Value Ref Range    Pregnancy test,urine (POC) Negative NEG     URINALYSIS W/ REFLEX CULTURE    Collection Time: 07/09/20  5:43 PM    Specimen: Urine   Result Value Ref Range    Color YELLOW/STRAW      Appearance CLEAR CLEAR      Specific gravity 1.015 1.003 - 1.030      pH (UA) 7.0 5.0 - 8.0      Protein Negative NEG mg/dL    Glucose Negative NEG mg/dL    Ketone Negative NEG mg/dL    Bilirubin Negative NEG      Blood Negative NEG      Urobilinogen 0.2 0.2 - 1.0 EU/dL    Nitrites Negative NEG      Leukocyte Esterase TRACE (A) NEG      WBC 0-4 0 - 4 /hpf    RBC 0-5 0 - 5 /hpf    Epithelial cells FEW FEW /lpf    Bacteria Negative NEG /hpf    UA:UC IF INDICATED CULTURE NOT INDICATED BY UA RESULT CNI      Hyaline cast 0-2 0 - 5 /lpf   HCG URINE, QL    Collection Time: 07/09/20  5:43 PM   Result Value Ref Range    HCG urine, QL Negative NEG     PROCALCITONIN    Collection Time: 07/09/20  8:10 PM   Result Value Ref Range    Procalcitonin 0.07 ng/mL   GLUCOSE, CSF    Collection Time: 07/09/20 10:15 PM   Result Value Ref Range    Tube No. 1      Glucose,CSF 60 40 - 70 MG/DL   PROTEIN, CSF    Collection Time: 07/09/20 10:15 PM   Result Value Ref Range    Tube No. 1      Protein,CSF 88 (H) 15 - 45 MG/DL   CULTURE, CSF W GRAM STAIN    Collection Time: 07/09/20 10:15 PM    Specimen: Cerebrospinal Fluid   Result Value Ref Range    Special Requests: NO SPECIAL REQUESTS      GRAM STAIN FEW WBCS SEEN      GRAM STAIN NO ORGANISMS SEEN      Culture result: PENDING    CELL COUNT, CSF    Collection Time: 07/09/20 10:15 PM   Result Value Ref Range    CSF TUBE NO. 3      CSF COLOR COLORLESS COL      CSF APPEARANCE CLEAR CLEAR      CSF RBCS 0 0 /cu mm    CSF WBCS 850 (HH) 0 - 5 /cu mm    CSF Neutrophils 4 0 - 7 %    LRG MONONUCLEAR 2 (H) 0 %    CSF LYMPH 83 28 - 96 %    CSF MONO 8 (L) 16 - 56 %    CSF MACROPHAGE 3 (H) 0 %   SAMPLES BEING HELD    Collection Time: 07/09/20 10:15 PM   Result Value Ref Range    SAMPLES BEING HELD  CSF TUBE 4     COMMENT        Add-on orders for these samples will be processed based on acceptable specimen integrity and analyte stability, which may vary by analyte.

## 2020-07-10 NOTE — PROGRESS NOTES
Problem: Risk for Spread of Infection  Goal: Prevent transmission of infectious organism to others  Description: Prevent the transmission of infectious organisms to other patients, staff members, and visitors.   7/10/2020 0518 by Enrico Notice  Outcome: Progressing Towards Goal  7/10/2020 0518 by Enrico Notice  Outcome: Progressing Towards Goal     Problem: Patient Education:  Go to Education Activity  Goal: Patient/Family Education  7/10/2020 0518 by Enrico Notice  Outcome: Progressing Towards Goal  7/10/2020 0518 by Enrico Notice  Outcome: Progressing Towards Goal

## 2020-07-10 NOTE — ED PROVIDER NOTES
EMERGENCY DEPARTMENT HISTORY AND PHYSICAL EXAM      Date: 7/9/2020  Patient Name: Natalia Buchanan  Patient Age and Sex: 26 y.o. female    History of Presenting Illness     Chief Complaint   Patient presents with    Generalized Body Aches     Pt reports for the past couple days she has been having a headache, body aches, and chills.  Headache    Nausea       History Provided By: Patient    Ability to gather history was limited by:     HPI: Natalia Buchanan, 26 y.o. female complains of generalized malaise and fatigue, whole body myalgia, but especially generalized headache and neck pain, for the past 4 days or so. Headache is moderate severity, throbbing and tight in nature, worsened by turning her head. Nausea, fever up to 101 degrees. No significant abdominal pain or dysuria or  complaints. No back pain. Location:    Quality:      Severity:    Duration:   Timing:      Context:    Modifying factors:   Associated symptoms:       The patient's medical, surgical, family, and social history on file were reviewed by me today. Past Medical History:   Diagnosis Date    Chlamydia     Endocrine disease     hypoglycemia    Gastrointestinal disorder     GERD    Hypokalemia     Ill-defined condition     kidney failure    Kidney disease     \"kidney failure a few years ago\"    Panic attacks     Psychiatric disorder     anxiety, panic disorder    Trichomonal infection      No past surgical history on file. PCP: None    Past History     Past Medical History:  Past Medical History:   Diagnosis Date    Chlamydia     Endocrine disease     hypoglycemia    Gastrointestinal disorder     GERD    Hypokalemia     Ill-defined condition     kidney failure    Kidney disease     \"kidney failure a few years ago\"    Panic attacks     Psychiatric disorder     anxiety, panic disorder    Trichomonal infection        Past Surgical History:  No past surgical history on file.     Family History:  Family History Problem Relation Age of Onset    Migraines Mother     Hypertension Father     Hypertension Maternal Uncle     Diabetes Paternal Aunt     Diabetes Paternal Uncle     Hypertension Paternal Grandfather     Stroke Paternal Grandfather        Social History:  Social History     Tobacco Use    Smoking status: Never Smoker    Smokeless tobacco: Never Used   Substance Use Topics    Alcohol use: No    Drug use: No       Allergies: Allergies   Allergen Reactions    Penicillins Hives    Vancomycin Itching and Other (comments)     Lips went numb       Current Medications:  No current facility-administered medications on file prior to encounter. Current Outpatient Medications on File Prior to Encounter   Medication Sig Dispense Refill    [] trimethoprim-sulfamethoxazole (Bactrim DS) 160-800 mg per tablet Take 1 Tab by mouth two (2) times a day for 7 days. 14 Tab 0    ibuprofen (MOTRIN) 600 mg tablet Take 1 Tab by mouth every six (6) hours as needed for Pain for up to 15 doses. 15 Tab 0       Review of Systems   Review of Systems   Constitutional: Positive for chills, fatigue and fever. Respiratory: Negative for shortness of breath. Cardiovascular: Negative for chest pain. Gastrointestinal: Negative for abdominal pain. Musculoskeletal: Positive for myalgias and neck pain. Negative for back pain. Neurological: Positive for headaches. All other systems reviewed and are negative. Physical Exam   Vital Signs  Patient Vitals for the past 8 hrs:   Temp Pulse Resp BP SpO2   20 2323 98 °F (36.7 °C)       20 2322     98 %   20 2321     97 %   20 2319    108/64    20 1637 100.3 °F (37.9 °C) 87 18 120/67 97 %          Physical Exam  Vitals signs and nursing note reviewed. Constitutional:       General: She is not in acute distress. Appearance: Normal appearance. She is well-developed. She is not ill-appearing.    HENT:      Head: Normocephalic and atraumatic. Mouth/Throat:      Mouth: Mucous membranes are moist.   Eyes:      General:         Right eye: No discharge. Left eye: No discharge. Conjunctiva/sclera: Conjunctivae normal.   Neck:      Musculoskeletal: Normal range of motion and neck supple. Normal range of motion. Pain with movement and muscular tenderness present. No spinous process tenderness. Meningeal: Brudzinski's sign absent. Cardiovascular:      Rate and Rhythm: Normal rate and regular rhythm. Heart sounds: Normal heart sounds. No murmur. Pulmonary:      Effort: Pulmonary effort is normal. No respiratory distress. Breath sounds: Normal breath sounds. No wheezing. Abdominal:      General: There is no distension. Palpations: Abdomen is soft. Tenderness: There is no abdominal tenderness. Musculoskeletal: Normal range of motion. General: No deformity. Skin:     General: Skin is warm and dry. Findings: No rash. Neurological:      General: No focal deficit present. Mental Status: She is alert and oriented to person, place, and time. GCS: GCS eye subscore is 4. GCS verbal subscore is 5. GCS motor subscore is 6. Cranial Nerves: Cranial nerves are intact. No facial asymmetry.    Psychiatric:         Speech: Speech normal.         Behavior: Behavior normal.         Cognition and Memory: Cognition normal.         Diagnostic Study Results   Labs  Recent Results (from the past 24 hour(s))   CBC WITH AUTOMATED DIFF    Collection Time: 07/09/20  5:13 PM   Result Value Ref Range    WBC 7.6 3.6 - 11.0 K/uL    RBC 4.55 3.80 - 5.20 M/uL    HGB 13.3 11.5 - 16.0 g/dL    HCT 40.1 35.0 - 47.0 %    MCV 88.1 80.0 - 99.0 FL    MCH 29.2 26.0 - 34.0 PG    MCHC 33.2 30.0 - 36.5 g/dL    RDW 11.7 11.5 - 14.5 %    PLATELET 297 377 - 405 K/uL    MPV 10.6 8.9 - 12.9 FL    NRBC 0.0 0  WBC    ABSOLUTE NRBC 0.00 0.00 - 0.01 K/uL    NEUTROPHILS 69 32 - 75 %    LYMPHOCYTES 26 12 - 49 % MONOCYTES 4 (L) 5 - 13 %    EOSINOPHILS 0 0 - 7 %    BASOPHILS 1 0 - 1 %    IMMATURE GRANULOCYTES 0 0.0 - 0.5 %    ABS. NEUTROPHILS 5.2 1.8 - 8.0 K/UL    ABS. LYMPHOCYTES 1.9 0.8 - 3.5 K/UL    ABS. MONOCYTES 0.3 0.0 - 1.0 K/UL    ABS. EOSINOPHILS 0.0 0.0 - 0.4 K/UL    ABS. BASOPHILS 0.0 0.0 - 0.1 K/UL    ABS. IMM. GRANS. 0.0 0.00 - 0.04 K/UL    DF AUTOMATED     METABOLIC PANEL, COMPREHENSIVE    Collection Time: 07/09/20  5:13 PM   Result Value Ref Range    Sodium 135 (L) 136 - 145 mmol/L    Potassium 3.7 3.5 - 5.1 mmol/L    Chloride 103 97 - 108 mmol/L    CO2 28 21 - 32 mmol/L    Anion gap 4 (L) 5 - 15 mmol/L    Glucose 105 (H) 65 - 100 mg/dL    BUN 9 6 - 20 MG/DL    Creatinine 0.93 0.55 - 1.02 MG/DL    BUN/Creatinine ratio 10 (L) 12 - 20      GFR est AA >60 >60 ml/min/1.73m2    GFR est non-AA >60 >60 ml/min/1.73m2    Calcium 9.1 8.5 - 10.1 MG/DL    Bilirubin, total 0.5 0.2 - 1.0 MG/DL    ALT (SGPT) 17 12 - 78 U/L    AST (SGOT) 9 (L) 15 - 37 U/L    Alk.  phosphatase 48 45 - 117 U/L    Protein, total 9.1 (H) 6.4 - 8.2 g/dL    Albumin 4.5 3.5 - 5.0 g/dL    Globulin 4.6 (H) 2.0 - 4.0 g/dL    A-G Ratio 1.0 (L) 1.1 - 2.2     CK W/ REFLX CKMB    Collection Time: 07/09/20  5:13 PM   Result Value Ref Range    CK 63 26 - 192 U/L   HCG URINE, QL. - POC    Collection Time: 07/09/20  5:22 PM   Result Value Ref Range    Pregnancy test,urine (POC) Negative NEG     URINALYSIS W/ REFLEX CULTURE    Collection Time: 07/09/20  5:43 PM    Specimen: Urine   Result Value Ref Range    Color YELLOW/STRAW      Appearance CLEAR CLEAR      Specific gravity 1.015 1.003 - 1.030      pH (UA) 7.0 5.0 - 8.0      Protein Negative NEG mg/dL    Glucose Negative NEG mg/dL    Ketone Negative NEG mg/dL    Bilirubin Negative NEG      Blood Negative NEG      Urobilinogen 0.2 0.2 - 1.0 EU/dL    Nitrites Negative NEG      Leukocyte Esterase TRACE (A) NEG      WBC 0-4 0 - 4 /hpf    RBC 0-5 0 - 5 /hpf    Epithelial cells FEW FEW /lpf    Bacteria Negative NEG /hpf    UA:UC IF INDICATED CULTURE NOT INDICATED BY UA RESULT CNI      Hyaline cast 0-2 0 - 5 /lpf   PROCALCITONIN    Collection Time: 07/09/20  8:10 PM   Result Value Ref Range    Procalcitonin 0.07 ng/mL   GLUCOSE, CSF    Collection Time: 07/09/20 10:15 PM   Result Value Ref Range    Tube No. 1      Glucose,CSF 60 40 - 70 MG/DL   PROTEIN, CSF    Collection Time: 07/09/20 10:15 PM   Result Value Ref Range    Tube No. 1      Protein,CSF 88 (H) 15 - 45 MG/DL   CELL COUNT, CSF    Collection Time: 07/09/20 10:15 PM   Result Value Ref Range    CSF TUBE NO. 3      CSF COLOR COLORLESS COL      CSF APPEARANCE CLEAR CLEAR      CSF RBCS 0 0 /cu mm    CSF WBCS 850 (HH) 0 - 5 /cu mm   SAMPLES BEING HELD    Collection Time: 07/09/20 10:15 PM   Result Value Ref Range    SAMPLES BEING HELD  CSF TUBE 4     COMMENT        Add-on orders for these samples will be processed based on acceptable specimen integrity and analyte stability, which may vary by analyte. Radiologic Studies  CT HEAD WO CONT    (Results Pending)     CT Results  (Last 48 hours)    None        CXR Results  (Last 48 hours)    None          Procedures   Lumbar Puncture    Date/Time: 7/9/2020 11:11 PM  Performed by: Dandre Flores MD  Authorized by: Dandre Flores MD     Consent:     Consent obtained:  Verbal and written    Consent given by:  Patient    Risks discussed:  Bleeding, infection and pain  Pre-procedure details:     Procedure purpose:  Diagnostic    Preparation: Patient was prepped and draped in usual sterile fashion    Anesthesia (see MAR for exact dosages):      Anesthesia method:  Local infiltration    Local anesthetic:  Lidocaine 1% w/o epi  Procedure details:     Lumbar space:  L4-L5 interspace    Patient position:  Sitting    Needle type:  Spinal needle - Quincke tip    Ultrasound guidance: no      Number of attempts:  1    Fluid appearance:  Clear    Tubes of fluid:  4  Post-procedure:     Puncture site:  Adhesive bandage applied Patient tolerance of procedure: Tolerated well, no immediate complications        Medical Decision Making     I reviewed the patient's most recent Emergency Dept notes and diagnostic tests  in formulating my MDM on today's visit. Provider Notes (Medical Decision Making):   27-year-old female complaining of generalized malaise and fatigue, fevers, chills, body aches, headache, neck pain, for the past 4 days or so. On exam she appears mildly fatigued and withdrawn. She has reassuring vital signs but does have a fever. Hemodynamically stable. Lungs are clear. Abdominal exam is benign. She reports pain with range of motion of the neck. Seems most likely uncomplicated viral illness, with reassuring laboratories, no leukocytosis, negative pro calcitonin. She could potentially have viral meningitis given the emphasis on headache and neck pain. Negative Brudzinski sign. After informed decision making, we decided to proceed with lumbar puncture. CSF was clear. Will await laboratory analysis of CSF. Tommy Tafoya MD  11:07 PM      CSF demonstrates 850 white blood cells, protein level of 88, no RBCs. This seems most consistent with meningitis, most likely viral meningitis, but will await Gram stain for further clarification. Patient was treated with ceftriaxone and vancomycin. H&P is not suggestive of herpes encephalitis, no indication for antiviral therapy at this time. Admit to medicine. Head CT is currently pending.     Tommy Tafoya MD  11:54 PM          Social History     Tobacco Use    Smoking status: Never Smoker    Smokeless tobacco: Never Used   Substance Use Topics    Alcohol use: No    Drug use: No     Patient Vitals for the past 4 hrs:   Temp BP SpO2   07/09/20 2323 98 °F (36.7 °C)     07/09/20 2322   98 %   07/09/20 2321   97 %   07/09/20 2319  108/64             Consults:      Medications Administered during ED course:  Medications   cefTRIAXone (ROCEPHIN) 2 g in 0.9% sodium chloride (MBP/ADV) 50 mL (has no administration in time range)   vancomycin (VANCOCIN) 1,000 mg in 0.9% sodium chloride (MBP/ADV) 250 mL (has no administration in time range)   acetaminophen (TYLENOL) tablet 650 mg (has no administration in time range)   0.9% sodium chloride infusion 1,000 mL (0 mL IntraVENous IV Completed 7/9/20 2218)   acetaminophen (TYLENOL) tablet 975 mg (975 mg Oral Given 7/9/20 1926)     ED Course as of Jul 09 2352   Thu Jul 09, 2020   0172 Procedure Note: Lumbar Puncture  The patient was placed in the seated position with help from the nursing staff. The area was cleansed and draped in usual sterile fashion using betadine scrub. Anesthesia was achieved with 1% lidocaine. A 20-gauge 3.5-inch spinal needle was placed in the L4 lumbar interspace. On the 1st attempt, clear colored cerebral spinal fluid was obtained. CSF was collected into 4 tubes. These were sent for the usual tests, including 1 tube to be held for further analysis if needed. A sterile bandaid was placed over the puncture site. The patient had no immediate complications and tolerated the procedure well. Estimated blood loss was 1cc. Performed by: Michaelle Sprague MD    [BQ]      ED Course User Index  [BQ] Jorge Baltazar MD       Current Discharge Medication List             Diagnosis and Disposition     Disposition:      Clinical Impression:   1. Meningitis        Attestation:  I personally performed the services described in this documentation on this date 7/9/2020 for patient Yoli Palma MD        I was the first provider for this patient on this visit. To the best of my ability I reviewed relevant prior medical records, electrocardiograms, laboratories, and radiologic studies. The patient's presenting problems were discussed, and the patient was in agreement with the care plan formulated and outlined with them.       Alcira Lozoya MD    Please note that this dictation was completed with Dragon voice recognition software. Quite often unanticipated grammatical, syntax, homophones, and other interpretive errors are inadvertently transcribed by the computer software. Please disregard these errors and excuse any errors that have escaped final proofreading.

## 2020-07-10 NOTE — ROUTINE PROCESS
TRANSFER - OUT REPORT: 
 
Verbal report given to Ruthie rPice RN on [facility]  being transferred to [facility] [phone] for routine progression of care Report consisted of patients Situation, Background, Assessment and  
Recommendations(SBAR). Information from the following report(s) SBAR, Kardex, ED Summary and MAR was reviewed with the receiving nurse. Opportunity for questions and clarification was provided. Patient transported with: 
 [employer]

## 2020-07-11 LAB
ANION GAP SERPL CALC-SCNC: 6 MMOL/L (ref 5–15)
BUN SERPL-MCNC: 10 MG/DL (ref 6–20)
BUN/CREAT SERPL: 13 (ref 12–20)
CALCIUM SERPL-MCNC: 8.1 MG/DL (ref 8.5–10.1)
CHLORIDE SERPL-SCNC: 113 MMOL/L (ref 97–108)
CMV DNA SERPL NAA+PROBE-ACNC: NEGATIVE IU/ML
CMV DNA SERPL NAA+PROBE-LOG IU: NORMAL LOG10 IU/ML
CO2 SERPL-SCNC: 23 MMOL/L (ref 21–32)
CREAT SERPL-MCNC: 0.76 MG/DL (ref 0.55–1.02)
ERYTHROCYTE [DISTWIDTH] IN BLOOD BY AUTOMATED COUNT: 11.7 % (ref 11.5–14.5)
GLUCOSE BLD STRIP.AUTO-MCNC: 119 MG/DL (ref 65–100)
GLUCOSE SERPL-MCNC: 88 MG/DL (ref 65–100)
HCT VFR BLD AUTO: 34.2 % (ref 35–47)
HERPES SIMPLEX VIRUS, CSF, UHSPT: NORMAL
HGB BLD-MCNC: 11.3 G/DL (ref 11.5–16)
MCH RBC QN AUTO: 30 PG (ref 26–34)
MCHC RBC AUTO-ENTMCNC: 33 G/DL (ref 30–36.5)
MCV RBC AUTO: 90.7 FL (ref 80–99)
NRBC # BLD: 0 K/UL (ref 0–0.01)
NRBC BLD-RTO: 0 PER 100 WBC
PLATELET # BLD AUTO: 232 K/UL (ref 150–400)
PMV BLD AUTO: 10.6 FL (ref 8.9–12.9)
POTASSIUM SERPL-SCNC: 3.8 MMOL/L (ref 3.5–5.1)
RBC # BLD AUTO: 3.77 M/UL (ref 3.8–5.2)
SARS-COV-2, COV2: NOT DETECTED
SERVICE CMNT-IMP: ABNORMAL
SODIUM SERPL-SCNC: 142 MMOL/L (ref 136–145)
SOURCE, COVRS: NORMAL
SPECIMEN SOURCE, FCOV2M: NORMAL
WBC # BLD AUTO: 6.7 K/UL (ref 3.6–11)

## 2020-07-11 PROCEDURE — 74011250637 HC RX REV CODE- 250/637: Performed by: EMERGENCY MEDICINE

## 2020-07-11 PROCEDURE — 36415 COLL VENOUS BLD VENIPUNCTURE: CPT

## 2020-07-11 PROCEDURE — 82962 GLUCOSE BLOOD TEST: CPT

## 2020-07-11 PROCEDURE — 65270000029 HC RM PRIVATE

## 2020-07-11 PROCEDURE — 74011250636 HC RX REV CODE- 250/636: Performed by: INTERNAL MEDICINE

## 2020-07-11 PROCEDURE — 74011000258 HC RX REV CODE- 258: Performed by: EMERGENCY MEDICINE

## 2020-07-11 PROCEDURE — 85027 COMPLETE CBC AUTOMATED: CPT

## 2020-07-11 PROCEDURE — 74011000258 HC RX REV CODE- 258: Performed by: INTERNAL MEDICINE

## 2020-07-11 PROCEDURE — 80048 BASIC METABOLIC PNL TOTAL CA: CPT

## 2020-07-11 PROCEDURE — 74011250636 HC RX REV CODE- 250/636: Performed by: EMERGENCY MEDICINE

## 2020-07-11 RX ADMIN — SODIUM CHLORIDE 100 ML/HR: 900 INJECTION, SOLUTION INTRAVENOUS at 19:14

## 2020-07-11 RX ADMIN — SODIUM CHLORIDE 100 ML/HR: 900 INJECTION, SOLUTION INTRAVENOUS at 05:57

## 2020-07-11 RX ADMIN — CEFTRIAXONE SODIUM 2 G: 2 INJECTION, POWDER, FOR SOLUTION INTRAMUSCULAR; INTRAVENOUS at 02:50

## 2020-07-11 RX ADMIN — ACYCLOVIR SODIUM 500 MG: 50 INJECTION, SOLUTION INTRAVENOUS at 01:41

## 2020-07-11 RX ADMIN — CEFTRIAXONE SODIUM 2 G: 2 INJECTION, POWDER, FOR SOLUTION INTRAMUSCULAR; INTRAVENOUS at 15:10

## 2020-07-11 RX ADMIN — ACETAMINOPHEN 650 MG: 325 TABLET ORAL at 18:51

## 2020-07-11 RX ADMIN — ACETAMINOPHEN 650 MG: 325 TABLET ORAL at 01:40

## 2020-07-11 RX ADMIN — ACYCLOVIR SODIUM 500 MG: 50 INJECTION, SOLUTION INTRAVENOUS at 18:00

## 2020-07-11 RX ADMIN — ACYCLOVIR SODIUM 500 MG: 50 INJECTION, SOLUTION INTRAVENOUS at 10:16

## 2020-07-11 NOTE — PROGRESS NOTES
Hospitalist Progress Note    NAME: Jolene Danielle   :  1995   MRN:  [id number]     Interim Hospital Summary: 26 y.o. female whom presented on 2020 with      Assessment / Plan:  Pt. Came with Headache  & neck pain x 2 days, fever x 1 day-  Which is suggestive of meningitis. 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 Total duration of therapy 14 days  - Pt encouraged to rest, drink fluids     - Await Covid test results           Body mass index is 20 kg/m². Code status: full  Prophylaxis: ambulatory  Recommended Disposition: home with family once stable       Subjective:feeling better. Less headach. Denies fever     Chief Complaint / Reason for Physician Visit  Follow up of  Viral meningitis  Chart reviewed in detail. Discussed with RN events overnight. Review of Systems:  Symptom Y/N Comments  Symptom Y/N Comments   Fever/Chills n   Chest Pain n    Poor Appetite n   Edema n    Cough n   Abdominal Pain n    Sputum    Joint Pain n    SOB/PURI n   Pruritis/Rash n    Nausea/vomit    Tolerating PT/OT     Diarrhea n   Tolerating Diet     Constipation n   Other       Could NOT obtain due to:      PO intake:   Patient Vitals for the past 72 hrs:   % Diet Eaten   07/10/20 1815 85 %   07/10/20 1532 40 %     Objective:     VITALS:   Last 24hrs VS reviewed since prior progress note.  Most recent are:  Patient Vitals for the past 24 hrs:   Temp Pulse Resp BP SpO2   07/11/20 0141 99 °F (37.2 °C) (!) 104 20 135/60    07/11/20 0049 99.2 °F (37.3 °C) 72 16 115/61 100 %   07/10/20 2142 98.3 °F (36.8 °C) 70 16 118/72 100 %   07/10/20 1532 98.6 °F (37 °C) 65 16 115/56 100 %   07/10/20 1205 98.6 °F (37 °C) 78 16 112/64        Intake/Output Summary (Last 24 hours) at 7/11/2020 0806  Last data filed at 7/10/2020 1815  Gross per 24 hour   Intake 480 ml   Output    Net 480 ml        PHYSICAL EXAM:  General: WD, WN. Alert, cooperative, no acute distress    EENT:  EOMI. Anicteric sclerae. MMM  Resp:  CTA bilaterally, no wheezing or rales. No accessory muscle use  CV:  Regular  rhythm,  No edema  GI:  Soft, Non distended, Non tender.  +Bowel sounds  Neurologic:  Alert and oriented X 3, normal speech,   Psych:   Good insight. Not anxious nor agitated  Skin:  No rashes. No jaundice    Reviewed most current lab test results and cultures  YES  Reviewed most current radiology test results   YES  Review and summation of old records today    NO  Reviewed patient's current orders and MAR    YES  PMH/ reviewed - no change compared to H&P  ________________________________________________________________________  Care Plan discussed with:    Comments   Patient     Family      RN     Care Manager     Consultant                        Multidiciplinary team rounds were held today with , nursing, pharmacist and clinical coordinator. Patient's plan of care was discussed; medications were reviewed and discharge planning was addressed.      ________________________________________________________________________  Total NON critical care TIME:     Minutes    Total CRITICAL CARE TIME Spent:   Minutes non procedure based      Comments   >50% of visit spent in counseling and coordination of care x     This includes time during multidisciplinary rounds if indicated above   ________________________________________________________________________  Aline Gamez MD Procedures: see electronic medical records for all procedures/Xrays and details which were not copied into this note but were reviewed prior to creation of Plan. LABS:  I reviewed today's most current labs and imaging studies.   Pertinent labs include:  Recent Labs     07/11/20 0603 07/09/20  1713   WBC 6.7 7.6   HGB 11.3* 13.3   HCT 34.2* 40.1    271     Recent Labs     07/11/20 0603 07/09/20  1713    135*   K 3.8 3.7   * 103   CO2 23 28   GLU 88 105*   BUN 10 9   CREA 0.76 0.93   CA 8.1* 9.1   ALB  --  4.5   TBILI  --  0.5   ALT  --  17

## 2020-07-11 NOTE — PROGRESS NOTES
Neurology Note    Patient ID:  Live Mata  [id number]  32 y.o.  1995      Date of Consultation:  July 11, 2020    Subjective: My headache is a little bit better       History of Present Illness:   Live Mata is a 26 y.o. female who was admitted for increasing headaches and malaise. She also had neck pain and photophobia. She was seen by 1 of my colleagues, Jared Lester yesterday. Since then, she does notice that her headache is better. She still is sensitive to light. There is no new numbness, tingling, or weakness. She reports that she has not needed to take any additional medications for her headache today. Past Medical History:   Diagnosis Date    Chlamydia     Endocrine disease     hypoglycemia    Gastrointestinal disorder     GERD    Hypokalemia     Ill-defined condition     kidney failure    Kidney disease     \"kidney failure a few years ago\"    Panic attacks     Psychiatric disorder     anxiety, panic disorder    Trichomonal infection         No past surgical history on file. Family History   Problem Relation Age of Onset   [place] Migraines Mother     Hypertension Father     Hypertension Maternal Uncle     Diabetes Paternal Aunt     Diabetes Paternal Uncle     Hypertension Paternal Grandfather     Stroke Paternal Grandfather         Social History     Tobacco Use    Smoking status: Never Smoker    Smokeless tobacco: Never Used   Substance Use Topics    Alcohol use: No        Allergies   Allergen Reactions    Penicillins Hives    Vancomycin Itching and Other (comments)     Lips went numb, \"swollen throat\" and itchy from her \"scalp to her legs\"        Prior to Admission medications    Medication Sig Start Date End Date Taking? Authorizing Provider   ibuprofen (MOTRIN) 600 mg tablet Take 1 Tab by mouth every six (6) hours as needed for Pain for up to 15 doses.  6/29/20   JOVANNY Olguin     Current Facility-Administered Medications   Medication Dose Route Frequency    acyclovir (ZOVIRAX) 500 mg in 0.9% sodium chloride 100 mL IVPB  10 mg/kg IntraVENous Q8H    0.9% sodium chloride infusion  100 mL/hr IntraVENous CONTINUOUS    cefTRIAXone (ROCEPHIN) 2 g in 0.9% sodium chloride (MBP/ADV) 50 mL  2 g IntraVENous Q12H    acetaminophen (TYLENOL) tablet 650 mg  650 mg Oral Q6H PRN       Review of Systems:    General, constitutional: Headache  Eyes, vision: negative  Ears, nose, throat: negative  Cardiovascular, heart: negative  Respiratory: negative  Gastrointestinal: negative  Genitourinary: negative  Musculoskeletal: negative  Skin and integumentary: negative  Psychiatric: negative  Endocrine: negative  Neurological: negative, except for HPI  Hematologic/lymphatic: negative  Allergy/immunology: negative      Objective:     Visit Vitals  /68 (BP 1 Location: Left arm, BP Patient Position: Sitting)   Pulse 70   Temp 98.6 °F (37 °C)   Resp 20   Ht 5' 3\" (1.6 m)   Wt 112 lb 14 oz (51.2 kg)   SpO2 100%   BMI 20.00 kg/m²       Physical Exam:      General:  appears well nourished in minimal discomfort. This is an improvement from what was reported to me yesterday  Neck: no carotid bruits  Lungs: clear to auscultation  Heart:  no murmurs, regular rate  Lower extremity: peripheral pulses palpable and no edema  Skin: intact    Neurological exam:    Awake, alert, oriented to person, place and time  Recent and remote memory were normal  Attention and concentration were intact  Language was intact. There was no aphasia  Speech: no dysarthria  Fund of knowledge was preserved    Cranial nerves:   II-XII were tested    Perrrla  Visual fields were full  Eomi, no evidence of nystagmus  Facial sensation:  normal and symmetric  Facial motor: normal and symmetric  Hearing intact  SCM strength intact  Tongue: midline without fasciculations    Motor: Tone normal    Pronator drift was absent    No evidence of fasciculations    Strength testing:   deltoid triceps biceps Wrist ext. Wrist flex. intrinsics Hip flex. Hip ext. Knee ext. Knee flex Dorsi flex Plantar flex   Right 5 5 5 5 5 5 5 5 5 5 5 5   Left 5 5 5 5 5 5 5 5 5 5 5 5         Sensory:  Upper extremity: intact to light touch  Lower extremity: intact to light touch    Reflexes:    Right Left  Biceps  2 2  Triceps 2 2  Brachiorad. 2 2  Patella  2 2  Achilles 2 2    Plantar response:  flexor bilaterally      Cerebellar testing:  no tremor apparent, finger/nose and jesus were intact    Labs:     Lab Results   Component Value Date/Time    Hemoglobin A1c <3.5 (L) 03/10/2019 04:47 PM    Sodium 142 07/11/2020 06:03 AM    Potassium 3.8 07/11/2020 06:03 AM    Chloride 113 (H) 07/11/2020 06:03 AM    Glucose 88 07/11/2020 06:03 AM    BUN 10 07/11/2020 06:03 AM    Creatinine 0.76 07/11/2020 06:03 AM    Calcium 8.1 (L) 07/11/2020 06:03 AM    WBC 6.7 07/11/2020 06:03 AM    HCT 34.2 (L) 07/11/2020 06:03 AM    HGB 11.3 (L) 07/11/2020 06:03 AM    PLATELET 675 31/49/5923 06:03 AM       Imaging:    No results found for this or any previous visit. Results from [place] encounter on 07/09/20   CT HEAD WO CONT    Narrative EXAM:  CT HEAD WO CONT    INDICATION: Headache, fever, enteritis. COMPARISON: CT 1/28/2017. TECHNIQUE: Noncontrast head CT. Coronal and sagittal reformats. CT dose  reduction was achieved through use of a standardized protocol tailored for this  examination and automatic exposure control for dose modulation. FINDINGS: The ventricles and sulci are age-appropriate without hydrocephalus. There is no mass effect or midline shift. There is no intracranial hemorrhage or  extra-axial fluid collection. There is no abnormal parenchymal attenuation. The  gray-white matter differentiation is maintained. The basal cisterns are patent. The osseous structures are intact. The visualized paranasal sinuses and mastoid  air cells are clear. Impression IMPRESSION:     No acute intracranial abnormality.                 Assessment and Plan:    The patient is a pleasant 27-year-old with acute onset of headache, photophobia and malaise. 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 Acute viral meningitis:  Clinically, she has begun to have some improvement with treatment of acyclovir. Continue to treat the headache as necessary. Encourage hydration. Infectious disease is following along and guiding antibiotic/antiviral treatment. If there are any other additional questions for neurology, please do not hesitate to contact me.       Ultimately, the patient should have a follow-up with Dr. Adleina Luna in the neurology clinic 3 to 4 weeks after discharge     Active Problems:    Meningitis (7/10/2020)                   Signed By:  Fang Dudley DO FAAN    July 11, 2020

## 2020-07-11 NOTE — PROGRESS NOTES
Pharmacy Automatic Renal Dosing Protocol - Antimicrobials    Indication for Antimicrobials: CNS infection     Current Regimen of Each Antimicrobial:  Ceftriaxone 2 gm IV every 12 hours (Start Date 7/10; Day # 2)  Acyclovir 500 mg IV every 8 hours (Start Date 7/10; Day # 2)    Previous Antimicrobial Therapy:  Linezolid 600 mg IV every 12 hours  (Start Date 7/10; Day # 1)    Significant Cultures:    CSF culture: NGTD-  pending  7/10: meningitis pcr: HSV 2 positive - final    Radiology / Imaging results: (X-ray, CT scan or MRI):     Labs:  Recent Labs     20  0603 20  1713   CREA 0.76 0.93   BUN 10 9   WBC 6.7 7.6     Temp (24hrs), Av.7 °F (37.1 °C), Min:98.3 °F (36.8 °C), Max:99.2 °F (37.3 °C)    Creatinine Clearance (mL/min) or Dialysis: 92 ml/min    Impression/Plan:   Abx as above  Ordered BMP, CBC in AM  Antimicrobial stop date TBD     Pharmacy will follow daily and adjust medications as appropriate for renal function and/or serum levels. Thank you,  RIKY Rodriguez    Recommended duration of therapy  [email or web address]/[facility]/Pharmacy/Clinical%20Companion/[email or web address]%[email or web address]. docx    Renal Dosing  [email or web address]/[facility]/Pharmacy/Clinical%20Companion/Renal%[id number]. pdf

## 2020-07-11 NOTE — PROGRESS NOTES
Bedside and Verbal shift change report given to Erendira George (oncoming nurse) by Kenny (offgoing nurse). Report included the following information SBAR, Kardex, MAR and Recent Results.

## 2020-07-11 NOTE — CONSULTS
Infectious Disease Consult  Antonio Ribera MD [facility]    Date of Consultation:  July 10, 2020    Referring Physician: Dr Aline Hodge:     Patient is a 26 y.o. female who is being seen for - \" Meningitis\"        IMPRESSION:     - HSV 2 Meningitis  - Headache  & neck pain x 2 days, fever x 1 day- suggestive of meningitis  - S/p LP - Fluid wbc 850, Ly- 83%, N- 4  - HSV2 + on meningitis panel  - CSF culture pending  - Pt denies HSV genitalis  - Pt presented to ED on 6/29, 7/02 with L/ labial lesion secondary to ingrown hair . now resolved  - Covid R/ out    SARS - Cov 2 pending  - Vancomycin allergy         PLAN:        - Continue Acyclovir IV, Ceftriaxone , Linezolid IV pending cultures  - May DC antibiotics if cultures are negative x 24 hours  - Continue Acyclovir IV, may transition to po Acyclovir or Valtrex once fever, headache symptoms resolve & pt is ready for DC. Total duration of therapy 14 days  - Pt encouraged to rest, drink fluids     - Await Covid test results     Samuel Lopez is a , 26 y.o. female who presented with complaints of generalized malaise and fatigue, whole body myalgia,  headache and neck pain, for the past 4 days or so. Headache is moderate severity, throbbing and tight in nature, worsened by turning her head. Nausea, fever up to 101 degrees. No significant abdominal pain or dysuria or  complaints. Also pain in back of neck. . Lumbar puncture was done  Fluid clear, wbc 850, Ly- 83%, N- 4   Pt has been started on Acyclovir , Zyvox , Ceftriaxone . Pt reported near anaphylaxis with Vancomycin IV  Pt is currently in Droplet plus isolation as a covid Rule out. I spoke to pt . She said headaches & neck pain 2 days ago, fevers 1 day ago . No cough or SOB . No h/o genital herpes   Of note pt hd been in ED on 6/29 & 7/02 for ingrown hair & folliculitis o labia  . Pt was given bactrim for it .  Pt reports labial lesion has healed     Patient Active Problem List   Diagnosis Code    Hypokalemia E87.6    Hypoglycemia E16.2    Anxiety F41.9    Acute cystitis without hematuria N30.00    Pregnant Z34.90    Threatened  labor, third trimester O47.03    Pregnancy Z34.90    Meningitis G03.9     Past Medical History:   Diagnosis Date    Chlamydia     Endocrine disease     hypoglycemia    Gastrointestinal disorder     GERD    Hypokalemia     Ill-defined condition     kidney failure    Kidney disease     \"kidney failure a few years ago\"    Panic attacks     Psychiatric disorder     anxiety, panic disorder    Trichomonal infection       Family History   Problem Relation Age of Onset    Migraines Mother     Hypertension Father     Hypertension Maternal Uncle     Diabetes Paternal Aunt     Diabetes Paternal Uncle     Hypertension Paternal Grandfather     Stroke Paternal Grandfather       Social History     Tobacco Use    Smoking status: Never Smoker    Smokeless tobacco: Never Used   Substance Use Topics    Alcohol use: No     No past surgical history on file. Prior to Admission medications    Medication Sig Start Date End Date Taking? Authorizing Provider   ibuprofen (MOTRIN) 600 mg tablet Take 1 Tab by mouth every six (6) hours as needed for Pain for up to 15 doses. 20   JOVANNY Molina     Allergies   Allergen Reactions    Penicillins Hives    Vancomycin Itching and Other (comments)     Lips went numb, \"swollen throat\" and itchy from her \"scalp to her legs\"        Review of Systems:  A comprehensive review of systems was negative except for that written in the History of Present Illness. 10 point review of systems obtained . All other systems negative    Objective:   Blood pressure 115/56, pulse 65, temperature 98.6 °F (37 °C), resp. rate 16, height 5' 3\" (1.6 m), weight 112 lb 14 oz (51.2 kg), SpO2 100 %, currently breastfeeding.   Temp (24hrs), Av.3 °F (36.8 °C), Min:97.7 °F (36.5 °C), Max:98.6 °F (37 °C)    Current Facility-Administered Medications Medication Dose Route Frequency    acyclovir (ZOVIRAX) 500 mg in 0.9% sodium chloride 100 mL IVPB  10 mg/kg IntraVENous Q8H    0.9% sodium chloride infusion  100 mL/hr IntraVENous CONTINUOUS    cefTRIAXone (ROCEPHIN) 2 g in 0.9% sodium chloride (MBP/ADV) 50 mL  2 g IntraVENous Q12H    acetaminophen (TYLENOL) tablet 650 mg  650 mg Oral Q6H PRN        Exam:   07/10/20 1532  98.6 °F (37 °C)  65  115/56      At rest  16  100 %            07/10/20 1209                  Room air          07/10/20 1205  98.6 °F (37 °C)  78  112/64  80    At rest  16    Room air          07/10/20 0740  97.7 °F (36.5 °C)  60  105/70  82    At rest  18                  Data Reviewed:   CBC:   Recent Labs     07/09/20  1713   WBC 7.6   RBC 4.55   HGB 13.3   HCT 40.1      GRANS 69   LYMPH 26   EOS 0     CMP:   Recent Labs     07/09/20  1713   *   *   K 3.7      CO2 28   BUN 9   CREA 0.93   CA 9.1   AGAP 4*   BUCR 10*   AP 48   TP 9.1*   ALB 4.5   GLOB 4.6*   AGRAT 1.0*       Lab Results   Component Value Date/Time    Culture result: Culture performed on Unspun Fluid 07/09/2020 10:15 PM    Culture result: NO GROWTH AT 13 HOURS 07/09/2020 10:15 PM    Culture result: NO GROWTH 1 DAY 07/26/2019 12:37 PM    Culture result: NO GROUP B BETA STREPTOCOCCUS ISOLATED 03/10/2019 04:47 PM    Culture result: MIXED UROGENITAL JOO ISOLATED 01/01/2018 09:05 AM    Culture result: NORMAL RESPIRATORY JOO/NO BETA STREP ISOLATED 01/01/2018 09:05 AM          XR Results (most recent):  Results from [place] encounter on 12/20/16   XR CHEST PA LAT    Narrative INDICATION: Chest pain    COMPARISON: December 2, 2016    FINDINGS: PA and lateral views of the chest demonstrate a stable  cardiomediastinal silhouette and clear lungs bilaterally. Mild dextroconvex  curvature of the thoracic spine is again noted. Impression IMPRESSION: No acute process. ICD-10-CM ICD-9-CM    1.  Meningitis G03.9 322.9    2. Acute cystitis without hematuria  N30.00 595.0    3. Anxiety  F41.9 300.00            Antibiotic History   Ceftriaxone / Zyvox/ Acyclovir    I have discussed the diagnosis with the patient and the intended plan as seen in the above orders. I have discussed medication side effects and warnings with the patient as well.     Reviewed test results at length with patient    Signed By: Sachin Suh MD FACP    July 10, 2020

## 2020-07-11 NOTE — PROGRESS NOTES
1430: [facility] lab called with critical value. . Patients lumbar puncture test is positive for HSV. Droplet contact continued per ID.    1450: TRANSFER - OUT REPORT:    Verbal report given to RN(name) on Hilda Funk  being transferred to [facility]/renal(unit) for routine progression of care       Report consisted of patients Situation, Background, Assessment and   Recommendations(SBAR). Information from the following report(s) SBAR, Kardex, Procedure Summary, Intake/Output, MAR and Accordion was reviewed with the receiving nurse. Lines:   Peripheral IV 07/09/20 Right Forearm (Active)   Site Assessment Clean, dry, & intact 07/11/20 1051   Phlebitis Assessment 0 07/11/20 1051   Infiltration Assessment 0 07/11/20 1051   Dressing Status Clean, dry, & intact 07/11/20 1051   Dressing Type Transparent 07/11/20 1051   Hub Color/Line Status Pink; Infusing 07/11/20 1051   Alcohol Cap Used Yes 07/10/20 1532        Opportunity for questions and clarification was provided.       Patient transported with:   Registered Nurse  Tech

## 2020-07-12 LAB
ANION GAP SERPL CALC-SCNC: 8 MMOL/L (ref 5–15)
BUN SERPL-MCNC: 10 MG/DL (ref 6–20)
BUN/CREAT SERPL: 12 (ref 12–20)
CALCIUM SERPL-MCNC: 8.7 MG/DL (ref 8.5–10.1)
CHLORIDE SERPL-SCNC: 111 MMOL/L (ref 97–108)
CO2 SERPL-SCNC: 24 MMOL/L (ref 21–32)
CREAT SERPL-MCNC: 0.83 MG/DL (ref 0.55–1.02)
ERYTHROCYTE [DISTWIDTH] IN BLOOD BY AUTOMATED COUNT: 11.7 % (ref 11.5–14.5)
GLUCOSE SERPL-MCNC: 87 MG/DL (ref 65–100)
HCT VFR BLD AUTO: 37.5 % (ref 35–47)
HGB BLD-MCNC: 12.2 G/DL (ref 11.5–16)
MCH RBC QN AUTO: 29.7 PG (ref 26–34)
MCHC RBC AUTO-ENTMCNC: 32.5 G/DL (ref 30–36.5)
MCV RBC AUTO: 91.2 FL (ref 80–99)
NRBC # BLD: 0 K/UL (ref 0–0.01)
NRBC BLD-RTO: 0 PER 100 WBC
PLATELET # BLD AUTO: 263 K/UL (ref 150–400)
PMV BLD AUTO: 11.3 FL (ref 8.9–12.9)
POTASSIUM SERPL-SCNC: 4 MMOL/L (ref 3.5–5.1)
RBC # BLD AUTO: 4.11 M/UL (ref 3.8–5.2)
SODIUM SERPL-SCNC: 143 MMOL/L (ref 136–145)
WBC # BLD AUTO: 8.4 K/UL (ref 3.6–11)

## 2020-07-12 PROCEDURE — 85027 COMPLETE CBC AUTOMATED: CPT

## 2020-07-12 PROCEDURE — 94760 N-INVAS EAR/PLS OXIMETRY 1: CPT

## 2020-07-12 PROCEDURE — 74011250636 HC RX REV CODE- 250/636: Performed by: INTERNAL MEDICINE

## 2020-07-12 PROCEDURE — 74011250637 HC RX REV CODE- 250/637: Performed by: EMERGENCY MEDICINE

## 2020-07-12 PROCEDURE — 74011000258 HC RX REV CODE- 258: Performed by: INTERNAL MEDICINE

## 2020-07-12 PROCEDURE — 80048 BASIC METABOLIC PNL TOTAL CA: CPT

## 2020-07-12 PROCEDURE — 74011000258 HC RX REV CODE- 258: Performed by: EMERGENCY MEDICINE

## 2020-07-12 PROCEDURE — 74011250636 HC RX REV CODE- 250/636: Performed by: EMERGENCY MEDICINE

## 2020-07-12 PROCEDURE — 65270000029 HC RM PRIVATE

## 2020-07-12 PROCEDURE — 36415 COLL VENOUS BLD VENIPUNCTURE: CPT

## 2020-07-12 RX ADMIN — ACYCLOVIR SODIUM 500 MG: 50 INJECTION, SOLUTION INTRAVENOUS at 17:31

## 2020-07-12 RX ADMIN — ACYCLOVIR SODIUM 500 MG: 50 INJECTION, SOLUTION INTRAVENOUS at 10:00

## 2020-07-12 RX ADMIN — ACETAMINOPHEN 650 MG: 325 TABLET ORAL at 09:24

## 2020-07-12 RX ADMIN — SODIUM CHLORIDE 100 ML/HR: 900 INJECTION, SOLUTION INTRAVENOUS at 19:30

## 2020-07-12 RX ADMIN — ACYCLOVIR SODIUM 500 MG: 50 INJECTION, SOLUTION INTRAVENOUS at 03:50

## 2020-07-12 RX ADMIN — CEFTRIAXONE SODIUM 2 G: 2 INJECTION, POWDER, FOR SOLUTION INTRAMUSCULAR; INTRAVENOUS at 04:00

## 2020-07-12 NOTE — PROGRESS NOTES
Hospitalist Progress Note    NAME: Shaquille Higginbotham   :  1995   MRN:  [id number]     Interim Hospital Summary: 26 y.o. female whom presented on 2020 with      Assessment / Plan:  The patient is a pleasant 78-year-old with acute onset of headache, photophobia,neck pain,fever and malaise. Which is suggestive of meningitis. 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 Pt presented to ED on ,  with L/ labial lesion secondary to ingrown hair  -now resolved, Vancomycin allergy    - Acute HSV 2 Meningitis S/p LP - Fluid wbc 850, Ly- 83%, N- 4  - HSV2 + on meningitis panel  - Pt denies HSV genitalis  Csf cx negative   Antibiotics stopped 10/11    Clinically, she has begun to have some improvement with treatment of acyclovir. - Continue Acyclovir IV, may transition to po Acyclovir or Valtrex once fever, headache symptoms resolve & pt is ready for DC. Total duration of therapy 14 days  - Pt encouraged to rest, drink fluids       Covid test negative 7/10        Per neurology  Ultimately, the patient should have a follow-up with Dr. Aaron Campbell in the neurology clinic 3 to 4 weeks after discharge       -Appreciate ID and Neuro consult       Body mass index is 20 kg/m². Code status: full  Prophylaxis: ambulatory  Recommended Disposition: home with family once stable       Subjective:feeling better. Less headach. Denies fever     Chief Complaint / Reason for Physician Visit  Follow up of  Viral meningitis  Chart reviewed in detail. Discussed with RN events overnight.        Review of Systems:  Symptom Y/N Comments  Symptom Y/N Comments   Fever/Chills n   Chest Pain n    Poor Appetite n   Edema n    Cough n   Abdominal Pain n    Sputum    Joint Pain n    SOB/PURI n   Pruritis/Rash n    Nausea/vomit    Tolerating PT/OT     Diarrhea n   Tolerating Diet     Constipation n   Other       Could NOT obtain due to:      PO intake:   Patient Vitals for the past 72 hrs:   % Diet Eaten   07/10/20 1815 85 %   07/10/20 1532 40 %     Objective:Lying  In bed . No acute distress. VITALS:   Last 24hrs VS reviewed since prior progress note. Most recent are:  Patient Vitals for the past 24 hrs:   Temp Pulse Resp BP SpO2   07/12/20 0749 98.4 °F (36.9 °C) 63 16 104/64 100 %   07/11/20 2340 98 °F (36.7 °C) 77 17 122/77 100 %   07/11/20 1840 98.5 °F (36.9 °C) 84 18 124/58 100 %   07/11/20 1503 98.4 °F (36.9 °C) 72 18 124/77 99 %   07/11/20 1051 98.6 °F (37 °C) 70 20 118/68 100 %     No intake or output data in the 24 hours ending 07/12/20 1028     PHYSICAL EXAM:  General: WD, WN. Alert, cooperative, no acute distress    EENT:  EOMI. Anicteric sclerae. MMM  Resp:  CTA bilaterally, no wheezing or rales. No accessory muscle use  CV:  Regular  rhythm,  No edema  GI:  Soft, Non distended, Non tender.  +Bowel sounds  Neurologic:  Alert and oriented X 3, normal speech,   Psych:   Good insight. Not anxious nor agitated  Skin:  No rashes. No jaundice    Reviewed most current lab test results and cultures  YES  Reviewed most current radiology test results   YES  Review and summation of old records today    NO  Reviewed patient's current orders and MAR    YES  PMH/ reviewed - no change compared to H&P  ________________________________________________________________________  Care Plan discussed with:    Comments   Patient     Family      RN     Care Manager     Consultant                        Multidiciplinary team rounds were held today with , nursing, pharmacist and clinical coordinator. Patient's plan of care was discussed; medications were reviewed and discharge planning was addressed.      ________________________________________________________________________  Total NON critical care TIME:     Minutes    Total CRITICAL CARE TIME Spent:   Minutes non procedure based      Comments   >50% of visit spent in counseling and coordination of care x     This includes time during multidisciplinary rounds if indicated above   ________________________________________________________________________  Nick Landin MD     Procedures: see electronic medical records for all procedures/Xrays and details which were not copied into this note but were reviewed prior to creation of Plan. LABS:  I reviewed today's most current labs and imaging studies.   Pertinent labs include:  Recent Labs     07/12/20  0354 07/11/20  0603 07/09/20  1713   WBC 8.4 6.7 7.6   HGB 12.2 11.3* 13.3   HCT 37.5 34.2* 40.1    232 271     Recent Labs     07/12/20  0354 07/11/20  0603 07/09/20  1713    142 135*   K 4.0 3.8 3.7   * 113* 103   CO2 24 23 28   GLU 87 88 105*   BUN 10 10 9   CREA 0.83 0.76 0.93   CA 8.7 8.1* 9.1   ALB  --   --  4.5   TBILI  --   --  0.5   ALT  --   --  17

## 2020-07-12 NOTE — PROGRESS NOTES
Bedside and Verbal shift change report given to [facility] (oncoming nurse) by Glendy Baires (offgoing nurse). Report included the following information SBAR, Kardex, MAR and Recent Results.

## 2020-07-12 NOTE — PROGRESS NOTES
Bedside and Verbal shift change report given to [address] (oncoming nurse) by Joshua Valle (offgoing nurse). Report included the following information SBAR, Kardex, Intake/Output, MAR, Accordion, Recent Results and Quality Measures.

## 2020-07-13 VITALS
HEART RATE: 72 BPM | HEIGHT: 63 IN | RESPIRATION RATE: 12 BRPM | DIASTOLIC BLOOD PRESSURE: 78 MMHG | WEIGHT: 112.88 LBS | TEMPERATURE: 98.8 F | BODY MASS INDEX: 20 KG/M2 | OXYGEN SATURATION: 100 % | SYSTOLIC BLOOD PRESSURE: 126 MMHG

## 2020-07-13 PROBLEM — A60.9 HSV (HERPES SIMPLEX VIRUS) ANOGENITAL INFECTION: Status: ACTIVE | Noted: 2020-07-13

## 2020-07-13 PROBLEM — B00.4 MENINGOENCEPHALITIS DUE TO HERPES SIMPLEX VIRUS (HSV) IN NEWBORN: Status: ACTIVE | Noted: 2020-07-13

## 2020-07-13 LAB
ANION GAP SERPL CALC-SCNC: 5 MMOL/L (ref 5–15)
BUN SERPL-MCNC: 10 MG/DL (ref 6–20)
BUN/CREAT SERPL: 15 (ref 12–20)
CALCIUM SERPL-MCNC: 7.7 MG/DL (ref 8.5–10.1)
CHLORIDE SERPL-SCNC: 112 MMOL/L (ref 97–108)
CO2 SERPL-SCNC: 26 MMOL/L (ref 21–32)
CREAT SERPL-MCNC: 0.65 MG/DL (ref 0.55–1.02)
ERYTHROCYTE [DISTWIDTH] IN BLOOD BY AUTOMATED COUNT: 11.4 % (ref 11.5–14.5)
GLUCOSE SERPL-MCNC: 86 MG/DL (ref 65–100)
HCT VFR BLD AUTO: 31 % (ref 35–47)
HGB BLD-MCNC: 10.2 G/DL (ref 11.5–16)
MCH RBC QN AUTO: 29.9 PG (ref 26–34)
MCHC RBC AUTO-ENTMCNC: 32.9 G/DL (ref 30–36.5)
MCV RBC AUTO: 90.9 FL (ref 80–99)
NRBC # BLD: 0 K/UL (ref 0–0.01)
NRBC BLD-RTO: 0 PER 100 WBC
PLATELET # BLD AUTO: 233 K/UL (ref 150–400)
PMV BLD AUTO: 10.8 FL (ref 8.9–12.9)
POTASSIUM SERPL-SCNC: 3.7 MMOL/L (ref 3.5–5.1)
RBC # BLD AUTO: 3.41 M/UL (ref 3.8–5.2)
SODIUM SERPL-SCNC: 143 MMOL/L (ref 136–145)
WBC # BLD AUTO: 6.3 K/UL (ref 3.6–11)

## 2020-07-13 PROCEDURE — 85027 COMPLETE CBC AUTOMATED: CPT

## 2020-07-13 PROCEDURE — 80048 BASIC METABOLIC PNL TOTAL CA: CPT

## 2020-07-13 PROCEDURE — 74011250636 HC RX REV CODE- 250/636: Performed by: INTERNAL MEDICINE

## 2020-07-13 PROCEDURE — 36415 COLL VENOUS BLD VENIPUNCTURE: CPT

## 2020-07-13 PROCEDURE — 94760 N-INVAS EAR/PLS OXIMETRY 1: CPT

## 2020-07-13 PROCEDURE — 74011000258 HC RX REV CODE- 258: Performed by: INTERNAL MEDICINE

## 2020-07-13 RX ORDER — VALACYCLOVIR HYDROCHLORIDE 500 MG/1
1000 TABLET, FILM COATED ORAL EVERY 12 HOURS
Status: DISCONTINUED | OUTPATIENT
Start: 2020-07-13 | End: 2020-07-13 | Stop reason: HOSPADM

## 2020-07-13 RX ORDER — VALACYCLOVIR HYDROCHLORIDE 1 G/1
1000 TABLET, FILM COATED ORAL EVERY 12 HOURS
Qty: 18 TAB | Refills: 0 | Status: SHIPPED | OUTPATIENT
Start: 2020-07-13 | End: 2020-07-22

## 2020-07-13 RX ORDER — ACETAMINOPHEN 325 MG/1
650 TABLET ORAL
Qty: 12 TAB | Refills: 0 | Status: SHIPPED
Start: 2020-07-13

## 2020-07-13 RX ADMIN — ACYCLOVIR SODIUM 500 MG: 50 INJECTION, SOLUTION INTRAVENOUS at 01:52

## 2020-07-13 RX ADMIN — ACYCLOVIR SODIUM 500 MG: 50 INJECTION, SOLUTION INTRAVENOUS at 11:23

## 2020-07-13 NOTE — PROGRESS NOTES
Pt and nurse reviewed discharge plan, pt signed with no questions or concerns. Pt left unit with all belongings.

## 2020-07-13 NOTE — DISCHARGE INSTRUCTIONS
Patient Education        Genital Herpes: Care Instructions  Your Care Instructions  Genital herpes is caused by a virus called herpes simplex. There are two types of this virus. Type 2 is the type that usually causes genital herpes. But type 1 can also cause it. Type 1 is the type that causes cold sores. Genital herpes is a sexually transmitted infection (STI). The most common way to get it is through sexual or other contact with someone who has herpes. For example, the virus can spread from a sore in the genital area to the lips. And it can spread from a cold sore on the lips to the genital area. Some people are surprised to find out that they have herpes or that they gave it to someone else. This is because a lot of people who have it don't know that they have it. They may not get sores or they may have sores that they cannot see. But the virus can still be spread by a person who does not have obvious sores or symptoms. There is no cure for herpes, but antiviral medicine can help you feel better and help prevent more outbreaks. This medicine may also lower the chance of spreading the virus. Follow-up care is a key part of your treatment and safety. Be sure to make and go to all appointments, and call your doctor if you are having problems. It's also a good idea to know your test results and keep a list of the medicines you take. How can you care for yourself at home? · Be safe with medicines. If your doctor prescribes medicine, take it exactly as prescribed. Call your doctor if you think you are having a problem with your medicine. You will get more details on the specific medicines your doctor prescribes. · To reduce the pain and itching from herpes sores:  ? Wash the area with water 3 or 4 times a day. ? Keep the sores clean and dry in between baths or showers. You may want to let the sores air dry. This may feel better than a towel. ? Wear cotton underwear. Cotton absorbs moisture well.   ? Take an over-the-counter pain medicine, such as acetaminophen (Tylenol), ibuprofen (Advil, Motrin), or naproxen (Aleve). Read and follow all instructions on the label. Do not take two or more pain medicines at the same time unless the doctor told you to. Many pain medicines have acetaminophen, which is Tylenol. Too much acetaminophen (Tylenol) can be harmful. To prevent the spread of genital herpes  · Latex condoms are a good way to reduce the risk of spreading the virus. But you can still get the virus even if you use a condom. For the best protection, use condoms every time you have sex, from the beginning to the end of sexual contact. Remember that you can infect someone even if you do not have obvious symptoms or sores. · Avoid sexual contact when you have symptoms. Symptoms include sores, tingling, or pain. · Wash your hands if you touch a sore. In some cases, especially if this is your first herpes outbreak, you can spread the virus to other parts of your body or to other people. · Having one sex partner (who does not have STIs and does not have sex with anyone else) is a good way to avoid STIs. · Talk to your sex partner or partners about genital herpes. · Encourage your sex partners to get a blood test to see if they have been infected. When should you call for help? Call your doctor now or seek immediate medical care if:  · You have a new fever. · There is increasing redness or red streaks around herpes sores. Watch closely for changes in your health, and be sure to contact your doctor if:  · You have herpes and you think you might be pregnant. · You have an outbreak of herpes sores, and the sores are not healing. · You have frequent outbreaks of genital herpes sores. · You are unable to pass urine or are constipated. · You want to start antiviral medicine. · You do not get better as expected. Where can you learn more?   Go to http://www.gray.com/  Enter U537 in the search box to learn more about \"Genital Herpes: Care Instructions. \"  Current as of: February 26, 2020               Content Version: 12.5  © 2006-2020 [email or web address]. Care instructions adapted under license by [employer] (which disclaims liability or warranty for this information). If you have questions about a medical condition or this instruction, always ask your healthcare professional. [facility] any warranty or liability for your use of this information. Patient Education        Viral Meningitis: Care Instructions  Your Care Instructions     Viral meningitis is an illness that causes inflammation in the tissues that surround the brain and spinal cord. It is not the same as bacterial meningitis. The viral illness is often milder. Bacterial meningitis can be very serious. You may have had a lumbar puncture test (spinal tap). This is done to find the cause of your symptoms. These often include a bad headache, a fever, and a stiff neck. Most people get better without treatment in a few weeks. But some people may feel weak and tired for months after the illness. The doctor has checked you carefully, but problems can develop later. If you notice any problems or new symptoms, get medical treatment right away. Follow-up care is a key part of your treatment and safety. Be sure to make and go to all appointments, and call your doctor if you are having problems. It's also a good idea to know your test results and keep a list of the medicines you take. How can you care for yourself at home? · Get plenty of rest. Stay in bed if you can. · Be safe with medicines. Read and follow all instructions on the label. ? If the doctor gave you a prescription medicine for pain, take it as prescribed. ? If you are not taking a prescription pain medicine, ask your doctor if you can take an over-the-counter medicine.   · To prevent dehydration, drink plenty of fluids, enough so that your urine is light yellow or clear like water. Choose water and other caffeine-free clear liquids until you feel better. If you have kidney, heart, or liver disease and have to limit fluids, talk with your doctor before you increase the amount of fluids you drink. · If you have had a lumbar puncture test (spinal tap):  ? You may be told to lie flat in bed or with your head slightly raised for 1 to 2 hours. This is to keep your headache from getting worse. ? You may feel tired and have a slight backache the day after the test. Some people have trouble sleeping for 1 to 2 days. · Wash your hands often to prevent spreading the infection to others. When should you call for help? [id number] anytime you think you may need emergency care. For example, call if:  · You have a seizure. Call your doctor now or seek immediate medical care if:  · You have a fever. · You have a severe headache. · You have a stiff neck. · You are nauseated or are vomiting. · You are confused or cannot think clearly. Watch closely for changes in your health, and be sure to contact your doctor if:  · You notice new numbness or weakness. · You do not get better as expected. Where can you learn more? Go to http://robbie-zehra.info/  Enter P636 in the search box to learn more about \"Viral Meningitis: Care Instructions. \"  Current as of: February 11, 2020               Content Version: 12.5  © 5355-8502 [employer]. Care instructions adapted under license by [employer] (which disclaims liability or warranty for this information). If you have questions about a medical condition or this instruction, always ask your healthcare professional. [place] [address] any warranty or liability for your use of this information.          Patient Education        Viral Meningitis: Care Instructions  Your Care Instructions     Viral meningitis is an illness that causes inflammation in the tissues that surround the brain and spinal cord. It is not the same as bacterial meningitis. The viral illness is often milder. Bacterial meningitis can be very serious. You may have had a lumbar puncture test (spinal tap). This is done to find the cause of your symptoms. These often include a bad headache, a fever, and a stiff neck. Most people get better without treatment in a few weeks. But some people may feel weak and tired for months after the illness. The doctor has checked you carefully, but problems can develop later. If you notice any problems or new symptoms, get medical treatment right away. Follow-up care is a key part of your treatment and safety. Be sure to make and go to all appointments, and call your doctor if you are having problems. It's also a good idea to know your test results and keep a list of the medicines you take. How can you care for yourself at home? · Get plenty of rest. Stay in bed if you can. · Be safe with medicines. Read and follow all instructions on the label. ? If the doctor gave you a prescription medicine for pain, take it as prescribed. ? If you are not taking a prescription pain medicine, ask your doctor if you can take an over-the-counter medicine. · To prevent dehydration, drink plenty of fluids, enough so that your urine is light yellow or clear like water. Choose water and other caffeine-free clear liquids until you feel better. If you have kidney, heart, or liver disease and have to limit fluids, talk with your doctor before you increase the amount of fluids you drink. · If you have had a lumbar puncture test (spinal tap):  ? You may be told to lie flat in bed or with your head slightly raised for 1 to 2 hours. This is to keep your headache from getting worse. ? You may feel tired and have a slight backache the day after the test. Some people have trouble sleeping for 1 to 2 days. · Wash your hands often to prevent spreading the infection to others.   When should you call for help? [id number] anytime you think you may need emergency care. For example, call if:  · You have a seizure. Call your doctor now or seek immediate medical care if:  · You have a fever. · You have a severe headache. · You have a stiff neck. · You are nauseated or are vomiting. · You are confused or cannot think clearly. Watch closely for changes in your health, and be sure to contact your doctor if:  · You notice new numbness or weakness. · You do not get better as expected. Where can you learn more? Go to http://robbie-zehra.info/  Enter P636 in the search box to learn more about \"Viral Meningitis: Care Instructions. \"  Current as of: February 11, 2020               Content Version: 12.5  © 3795-7224 Healthwise, Incorporated. Care instructions adapted under license by [employer] (which disclaims liability or warranty for this information). If you have questions about a medical condition or this instruction, always ask your healthcare professional. [address] any warranty or liability for your use of this information.

## 2020-07-13 NOTE — DISCHARGE SUMMARY
Hospitalist Discharge Summary     Patient ID:  Aracely Faustin  [id number]  26 y.o.  1995    PCP on record: None    Admit date: 7/9/2020  Discharge date and time: 7/13/2020      DISCHARGE DIAGNOSIS:    1-Acute HSV 2 Meningitis    2-Headach        CONSULTATIONS:  IP CONSULT TO HOSPITALIST  IP CONSULT TO NEUROLOGY  IP CONSULT TO INFECTIOUS DISEASES    Excerpted HPI from H&P of Robert Stovall MD:    26years old female from home with past medical history significant for renal disease, psych disorders, GERD presented to the hospital for evaluation of generalized weakness associated with headache and neck pain and stiffness started about 4 days ago associated with fever, patient denies any shortness of breath denies any rash, work in ED show no significant acute abnormality, head CT scan was done show no acute abnormality. We were asked to admit for work up and evaluation of the above problems. ______________________________________________________________________  DISCHARGE SUMMARY/HOSPITAL COURSE:  for full details see H&P, daily progress notes, labs, consult notes. The patient is a pleasant 81-year-old with acute onset of headache, photophobia,neck pain,fever and malaise. Which is suggestive of meningitis. 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 Pt presented to ED on 6/29, 7/02 with L/ labial lesion secondary to ingrown hair  -now resolved, Vancomycin allergy     - Acute HSV 2 Meningitis S/p LP - Fluid wbc 850, Ly- 83%, N- 4  - HSV2 + on meningitis panel  - Pt denies HSV genitalis  Csf cx negative   Antibiotics stopped 10/11   Clinically, she has begun to have  improvement with treatment of acyclovir. - Was Continue on  Acyclovir IV until 7/13 , today transition to po  Valtrex as pt is ready for DC.  Total duration of therapy 14 days  - Pt encouraged to rest, drink fluids        Covid test negative 7/10 Today  7/13  she was asymptomatic  And was discharged on valtrex 1gram bid for 9 days      Per neurology  Ultimately, the patient should have a follow-up with Dr. Lily Langley in the neurology clinic 3 to 4 weeks after discharge              Body mass index is 20 kg/m².       _______________________________________________________________________  Patient seen and examined by me on discharge day. Pertinent Findings:  Gen:    Not in distress  Chest: Clear lungs  CVS:   Regular rhythm. No edema  Abd:  Soft, not distended, not tender  Neuro:  Alert, Oriented x 4, grossly non focal exam  _______________________________________________________________________  DISCHARGE MEDICATIONS:   Current Discharge Medication List      START taking these medications    Details   acetaminophen (TYLENOL) 325 mg tablet Take 2 Tabs by mouth every six (6) hours as needed for Pain or Fever. Qty: 12 Tab, Refills: 0      valACYclovir (VALTREX) 1 gram tablet Take 1 Tab by mouth every twelve (12) hours for 9 days. Qty: 18 Tab, Refills: 0         STOP taking these medications       trimethoprim-sulfamethoxazole (Bactrim DS) 160-800 mg per tablet Comments:   Reason for Stopping:         ibuprofen (MOTRIN) 600 mg tablet Comments:   Reason for Stopping:               My Recommended Diet, Activity, Wound Care, and follow-up labs are listed in the patient's Discharge Insturctions which I have personally completed and reviewed.   Risk of deterioration: Low    Condition at Discharge:  Stable  _____________________________________________________________________    Disposition  Home with family, no needs  ____________________________________________________________________    Care Plan discussed with:   Patient, Family, Care Manager, Consultant    ____________________________________________________________________    Code Status: Full Code  ____________________________________________________________________      Condition at Discharge: Stable  _____________________________________________________________________  Follow up with:   should have a follow-up with Dr. Ольга Persaud in the neurology clinic 3 to 4 weeks after discharge        PCP :  F/U  3-4 weeks  Follow-up Information     Follow up With Specialties Details Why Contact Info    None    None ([phone]) Patient stated that they have no PCP                Total time in minutes spent coordinating this discharge (includes going over instructions, follow-up, prescriptions, and preparing report for sign off to her PCP) :  35 minutes    Signed:  Nick Landin MD

## 2020-07-13 NOTE — PROGRESS NOTES
Bedside and Verbal shift change report given to [facility] (oncoming nurse) by Minh Ahuja (offgoing nurse). Report included the following information SBAR, Kardex, Intake/Output, MAR, Accordion, Recent Results, Med Rec Status and Quality Measures.

## 2020-07-14 ENCOUNTER — PATIENT OUTREACH (OUTPATIENT)
Dept: CASE MANAGEMENT | Age: 25
End: 2020-07-14

## 2020-07-14 LAB
WNV IGG SPEC QL IA: POSITIVE
WNV IGM CSF QL IA: NEGATIVE

## 2020-07-14 NOTE — PROGRESS NOTES
Patient contacted regarding recent discharge and COVID-19 risk. Discussed COVID-19 related testing which was available at this time. Test results were negative. Patient informed of results, if available? yes    Care Transition Nurse contacted the patient by telephone to perform post discharge assessment. Verified name and  with patient as identifiers. Patient has following risk factors of: Renal Disease. CTN/ACM reviewed discharge instructions, medical action plan and red flags related to discharge diagnosis. Reviewed and educated them on any new and changed medications related to discharge diagnosis. Advised obtaining a 90-day supply of all daily and as-needed medications. START taking these medications     Details   acetaminophen (TYLENOL) 325 mg tablet Take 2 Tabs by mouth every six (6) hours as needed for Pain or Fever.       valACYclovir (VALTREX) 1 gram tablet Take 1 Tab by mouth every twelve (12) hours for 9 days.                STOP taking these medications         trimethoprim-sulfamethoxazole (Bactrim DS) 160-800 mg per tablet            ibuprofen (MOTRIN) 600 mg tablet            Patient reports feel much better, will f/u with (PCP) Dr. Shah Degree on 7/15/20. Education provided regarding infection prevention, and signs and symptoms of COVID-19 and when to seek medical attention with patient who verbalized understanding. Discussed exposure protocols and quarantine from [address] you at higher risk for severe illness  and given an opportunity for questions and concerns. The patient agrees to contact the COVID-19 hotline [phone] or PCP office for questions related to their healthcare. CTN/ACM provided contact information for future reference. From CDC: Are you at higher risk for severe illness?  Wash your hands often.  Avoid close contact (6 feet, which is about two arm lengths) with people who are sick.    Put distance between yourself and other people if COVID-19 is spreading in your community.  Clean and disinfect frequently touched surfaces.  Avoid all cruise travel and non-essential air travel.  Call your healthcare professional if you have concerns about COVID-19 and your underlying condition or if you are sick. For more information on steps you can take to protect yourself, see CDC's How to Protect Yourself      Patient/family/caregiver given information for GetWell Loop and agrees to enroll yes  Patient's preferred e-mail:  [email or web address]  Patient's preferred phone number: [phone]  Based on Loop alert triggers, patient will be contacted by nurse care manager for worsening symptoms. Pt will be further monitored by COVID Loop Team based on severity of symptoms and risk factors.

## 2020-07-15 ENCOUNTER — VIRTUAL VISIT (OUTPATIENT)
Dept: FAMILY MEDICINE CLINIC | Age: 25
End: 2020-07-15

## 2020-07-15 DIAGNOSIS — B00.3 HERPESVIRAL MENINGITIS: ICD-10-CM

## 2020-07-15 DIAGNOSIS — Z09 HOSPITAL DISCHARGE FOLLOW-UP: Primary | ICD-10-CM

## 2020-07-15 DIAGNOSIS — F41.0 GENERALIZED ANXIETY DISORDER WITH PANIC ATTACKS: ICD-10-CM

## 2020-07-15 DIAGNOSIS — F41.1 GENERALIZED ANXIETY DISORDER WITH PANIC ATTACKS: ICD-10-CM

## 2020-07-15 DIAGNOSIS — R53.81 PHYSICAL DECONDITIONING: ICD-10-CM

## 2020-07-15 DIAGNOSIS — Z72.51 HIGH RISK HETEROSEXUAL BEHAVIOR: ICD-10-CM

## 2020-07-15 PROBLEM — O47.03 THREATENED PRETERM LABOR, THIRD TRIMESTER: Status: RESOLVED | Noted: 2019-03-10 | Resolved: 2020-07-15

## 2020-07-15 PROBLEM — G03.9 MENINGITIS: Status: RESOLVED | Noted: 2020-07-10 | Resolved: 2020-07-15

## 2020-07-15 PROBLEM — Z34.90 PREGNANT: Status: RESOLVED | Noted: 2018-02-06 | Resolved: 2020-07-15

## 2020-07-15 PROBLEM — B00.4 MENINGOENCEPHALITIS DUE TO HERPES SIMPLEX VIRUS (HSV) IN NEWBORN: Status: RESOLVED | Noted: 2020-07-13 | Resolved: 2020-07-15

## 2020-07-15 PROBLEM — Z34.90 PREGNANCY: Status: RESOLVED | Noted: 2019-03-24 | Resolved: 2020-07-15

## 2020-07-15 NOTE — PROGRESS NOTES
Consent: Gomez Gunter, who was seen by synchronous (real-time) audio-video technology, and/or her healthcare decision maker, is aware that this patient-initiated, Telehealth encounter on 7/15/2020 is a billable service, with coverage as determined by her insurance carrier. She is aware that she may receive a bill and has provided verbal consent to proceed: Yes. Gomez Gunter is a 26 y.o. female    No LMP recorded. This pt is new to our practice  Previous PCP: none    She uses ED for her acute care.      has a past medical history of Chlamydia, Chronic kidney disease, Endocrine disease, Generalized anxiety disorder with panic attacks (7/15/2020), GERD (gastroesophageal reflux disease), Headache, High risk heterosexual behavior (7/15/2020), HSV (herpes simplex virus) anogenital infection (7/13/2020), Hypokalemia, Kidney disease, Psychotic disorder ([facility] [address] and Trichomonal infection. Hospital discharge f/u  Herpes 2 meningitis  Is now home with valtrex  Physically tired and feels weak. Denies fever, no more HA. We'll do refer for PT or home health for physical deconditioning  She'll need to make apt to neuro    Hx of multiple STDs multiple times. I expressed concerns for her high risk behavior  We'll f/u for further testing of HIV, etc, consider birth control. Labs reviewed, renal function wnl. Reviewed: active problem list, medication list, allergies, notes from last encounter, lab results    A comprehensive review of systems was negative except for that written in the HPI. Assessment & Plan:   Diagnoses and all orders for this visit:    1. Hospital discharge follow-up  -     [address] [place] [address]    2. Herpesviral meningitis  -     REFERRAL TO HOME HEALTH    3. Physical deconditioning  -     REFERRAL TO HOME HEALTH    4. High risk heterosexual behavior    5.  Generalized anxiety disorder with panic attacks        Follow-up and Dispositions    · Return in about 3 weeks (around 8/5/2020) for annual exam.         I spent at least 25 minutes with this established patient, and >50% of the time was spent counseling and/or coordinating care regarding hospital dishcharge f/u, meningitis  712  Subjective:   Crystal Stock is a 26 y.o. female who was seen for Hospital Follow Up and New Patient      Prior to Admission medications    Medication Sig Start Date End Date Taking? Authorizing Provider   acetaminophen (TYLENOL) 325 mg tablet Take 2 Tabs by mouth every six (6) hours as needed for Pain or Fever. 7/13/20  Yes Shankar Zhao MD   valACYclovir (VALTREX) 1 gram tablet Take 1 Tab by mouth every twelve (12) hours for 9 days. 7/13/20 7/22/20 Yes Shankar Zhao MD     Allergies   Allergen Reactions    Penicillins Hives    Vancomycin Itching and Other (comments)     Lips went numb, \"swollen throat\" and itchy from her \"scalp to her legs\"       Patient Active Problem List    Diagnosis Date Noted    Generalized anxiety disorder with panic attacks 07/15/2020    High risk heterosexual behavior 07/15/2020    HSV (herpes simplex virus) anogenital infection 07/13/2020    Herpesviral meningitis 07/13/2020    Hypokalemia 10/27/2016    Hypoglycemia 10/27/2016     Past Medical History:   Diagnosis Date    Chlamydia     Chronic kidney disease     Endocrine disease     hypoglycemia    Generalized anxiety disorder with panic attacks 7/15/2020    GERD (gastroesophageal reflux disease)     Headache     High risk heterosexual behavior 7/15/2020    HSV (herpes simplex virus) anogenital infection 7/13/2020    Hypokalemia     Kidney disease     \"kidney failure a few years ago\"    Psychotic disorder ([facility] [address]     Trichomonal infection      History reviewed. No pertinent surgical history. Objective:   Vital Signs: (As obtained by patient/caregiver at home)  There were no vitals taken for this visit.      Constitutional: [x] Appears well-developed and well-nourished [x] No apparent distress      [] Abnormal -     Mental status: [x] Alert and awake  [x] Oriented to person/place/time [x] Able to follow commands    [] Abnormal -     Eyes:   EOM    [x]  Normal    [] Abnormal -   Sclera  [x]  Normal    [] Abnormal -          Discharge [x]  None visible   [] Abnormal -     HENT: [x] Normocephalic, atraumatic  [] Abnormal -   [] Mouth/Throat: Mucous membranes are moist    External Ears [x] Normal  [] Abnormal -    Neck: [x] No visualized mass [] Abnormal -     Pulmonary/Chest: [x] Respiratory effort normal   [x] No visualized signs of difficulty breathing or respiratory distress        [] Abnormal -      Musculoskeletal:   [x] Normal gait with no signs of ataxia         [x] Normal range of motion of neck        [] Abnormal -     Neurological:        [x] No Facial Asymmetry (Cranial nerve 7 motor function) (limited exam due to video visit)          [x] No gaze palsy        [] Abnormal -          Skin:        [x] No significant exanthematous lesions or discoloration noted on facial skin         [] Abnormal -            Psychiatric:       [x] Normal Affect [] Abnormal -        [x] No Hallucinations      We discussed the expected course, resolution and complications of the diagnosis(es) in detail. Medication risks, benefits, costs, interactions, and alternatives were discussed as indicated. I advised her to contact the office if her condition worsens, changes or fails to improve as anticipated. She expressed understanding with the diagnosis(es) and plan. Flora Rao is a 26 y.o. female being evaluated by a video visit encounter for concerns as above. A caregiver was present when appropriate. Due to this being a TeleHealth encounter (During [facility] public health emergency), evaluation of the following organ systems was limited: Vitals/Constitutional/EENT/Resp/CV/GI//MS/Neuro/Skin/Heme-Lymph-Imm.   Pursuant to the emergency declaration under the [facility] waiver authority and the [employer] Act, this Virtual  Visit was conducted, with patient's (and/or legal guardian's) consent, to reduce the patient's risk of exposure to COVID-19 and provide necessary medical care. Services were provided through a video synchronous discussion virtually to substitute for in-person clinic visit. Patient and provider were located at their individual homes.         Roxie Ring MD

## 2020-07-16 ENCOUNTER — TELEPHONE (OUTPATIENT)
Dept: FAMILY MEDICINE CLINIC | Age: 25
End: 2020-07-16

## 2020-07-16 ENCOUNTER — HOME HEALTH ADMISSION (OUTPATIENT)
Dept: HOME HEALTH SERVICES | Facility: HOME HEALTH | Age: 25
End: 2020-07-16
Payer: COMMERCIAL

## 2020-07-16 DIAGNOSIS — B00.3 HERPESVIRAL MENINGITIS: Primary | ICD-10-CM

## 2020-07-16 DIAGNOSIS — R53.81 PHYSICAL DECONDITIONING: ICD-10-CM

## 2020-07-16 DIAGNOSIS — Z09 HOSPITAL DISCHARGE FOLLOW-UP: ICD-10-CM

## 2020-07-16 LAB
BACTERIA SPEC CULT: NORMAL
GRAM STN SPEC: NORMAL
GRAM STN SPEC: NORMAL
SERVICE CMNT-IMP: NORMAL

## 2020-07-16 NOTE — TELEPHONE ENCOUNTER
----- Message from Nai Killian sent at 7/16/2020  3:04 PM EDT -----  Regarding: Dr. Figueroa Beams: [phone]  Caller's first and last name: n/a  Reason for call: Pt would like to know if she has any restrictions after her discharge from the hospital.   Callback required yes/no and why: yes  Best contact number(s): [phone]  Details to clarify the request: n/a

## 2020-07-16 NOTE — TELEPHONE ENCOUNTER
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    This means she probably had exposure to WNV in the past .    Continue with HSV2  meningitis treatment, drink lots of fluids , rest

## 2020-07-16 NOTE — TELEPHONE ENCOUNTER
Two pt identifiers confirmed. Received fax to contact Alexis Mackey in regards to abnormal 1 [facility] lab. Results were IGG positive. Labs being faxed over to our office. Alexis Mackey verbalized understanding of information discussed w/ no further questions at this time.

## 2020-07-16 NOTE — TELEPHONE ENCOUNTER
----- Message from Donna Schmitz sent at 7/16/2020  2:56 PM EDT -----  Regarding: dr nichole/ telephone      General Message/Vendor Calls    Caller's first and last name: Rufus Lea from [facility]      Reason for call: she needs to clarify what services are needed for the pt      Callback required yes/no and why: yes      Best contact number(s): [phone]      Details to clarify the request:      Donna Schmitz

## 2020-07-16 NOTE — TELEPHONE ENCOUNTER
Called spoke to pt. Hospital put no restriction on her    Advised to do as much physical activities as she's able to regain her strength.      Kiersten Veras MD  7/16/2020

## 2020-07-16 NOTE — TELEPHONE ENCOUNTER
Two pt identifiers confirmed. Informed pt per Dr. 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 This means she probably had exposure to WNV in the past .    Continue with HSV2  meningitis treatment, drink lots of fluids , rest.    Pt verbalized understanding of information discussed w/ no further questions at this time.

## 2020-07-20 ENCOUNTER — HOME CARE VISIT (OUTPATIENT)
Dept: HOME HEALTH SERVICES | Facility: HOME HEALTH | Age: 25
End: 2020-07-20
Payer: COMMERCIAL

## 2020-07-20 ENCOUNTER — HOME CARE VISIT (OUTPATIENT)
Dept: SCHEDULING | Facility: HOME HEALTH | Age: 25
End: 2020-07-20
Payer: COMMERCIAL

## 2020-07-20 VITALS
SYSTOLIC BLOOD PRESSURE: 108 MMHG | RESPIRATION RATE: 14 BRPM | TEMPERATURE: 98.8 F | DIASTOLIC BLOOD PRESSURE: 76 MMHG | HEART RATE: 76 BPM | OXYGEN SATURATION: 97 %

## 2020-07-20 PROCEDURE — G0151 HHCP-SERV OF PT,EA 15 MIN: HCPCS

## 2020-07-20 PROCEDURE — 400013 HH SOC

## 2020-07-25 ENCOUNTER — HOME CARE VISIT (OUTPATIENT)
Dept: SCHEDULING | Facility: HOME HEALTH | Age: 25
End: 2020-07-25
Payer: COMMERCIAL

## 2020-07-25 ENCOUNTER — HOME CARE VISIT (OUTPATIENT)
Dept: HOME HEALTH SERVICES | Facility: HOME HEALTH | Age: 25
End: 2020-07-25
Payer: COMMERCIAL

## 2020-07-25 VITALS
HEART RATE: 70 BPM | RESPIRATION RATE: 16 BRPM | DIASTOLIC BLOOD PRESSURE: 78 MMHG | OXYGEN SATURATION: 98 % | SYSTOLIC BLOOD PRESSURE: 108 MMHG | TEMPERATURE: 98.1 F

## 2020-07-25 PROCEDURE — G0151 HHCP-SERV OF PT,EA 15 MIN: HCPCS

## 2020-07-28 ENCOUNTER — HOME CARE VISIT (OUTPATIENT)
Dept: SCHEDULING | Facility: HOME HEALTH | Age: 25
End: 2020-07-28
Payer: COMMERCIAL

## 2020-07-28 ENCOUNTER — HOME CARE VISIT (OUTPATIENT)
Dept: HOME HEALTH SERVICES | Facility: HOME HEALTH | Age: 25
End: 2020-07-28
Payer: COMMERCIAL

## 2020-07-28 VITALS
HEART RATE: 68 BPM | DIASTOLIC BLOOD PRESSURE: 66 MMHG | TEMPERATURE: 98.1 F | RESPIRATION RATE: 16 BRPM | SYSTOLIC BLOOD PRESSURE: 118 MMHG | OXYGEN SATURATION: 98 %

## 2020-07-28 PROCEDURE — G0151 HHCP-SERV OF PT,EA 15 MIN: HCPCS

## 2020-07-31 ENCOUNTER — HOME CARE VISIT (OUTPATIENT)
Dept: SCHEDULING | Facility: HOME HEALTH | Age: 25
End: 2020-07-31
Payer: COMMERCIAL

## 2020-07-31 VITALS
RESPIRATION RATE: 14 BRPM | TEMPERATURE: 97.8 F | HEART RATE: 67 BPM | SYSTOLIC BLOOD PRESSURE: 118 MMHG | DIASTOLIC BLOOD PRESSURE: 66 MMHG | OXYGEN SATURATION: 99 %

## 2020-07-31 PROCEDURE — G0151 HHCP-SERV OF PT,EA 15 MIN: HCPCS

## 2020-09-26 ENCOUNTER — HOSPITAL ENCOUNTER (EMERGENCY)
Age: 25
Discharge: HOME | End: 2020-09-26
Attending: EMERGENCY MEDICINE | Admitting: EMERGENCY MEDICINE
Payer: COMMERCIAL

## 2020-09-26 VITALS
DIASTOLIC BLOOD PRESSURE: 62 MMHG | TEMPERATURE: 98.1 F | SYSTOLIC BLOOD PRESSURE: 113 MMHG | HEART RATE: 73 BPM | HEIGHT: 62 IN | BODY MASS INDEX: 21.16 KG/M2 | WEIGHT: 115 LBS | OXYGEN SATURATION: 100 % | RESPIRATION RATE: 16 BRPM

## 2020-09-26 DIAGNOSIS — J39.2 SWELLING OF THROAT: Primary | ICD-10-CM

## 2020-09-26 DIAGNOSIS — J30.89 ENVIRONMENTAL AND SEASONAL ALLERGIES: ICD-10-CM

## 2020-09-26 LAB — DEPRECATED S PYO AG THROAT QL EIA: NEGATIVE

## 2020-09-26 PROCEDURE — 99283 EMERGENCY DEPT VISIT LOW MDM: CPT

## 2020-09-26 PROCEDURE — 87880 STREP A ASSAY W/OPTIC: CPT

## 2020-09-26 PROCEDURE — 74011250637 HC RX REV CODE- 250/637: Performed by: EMERGENCY MEDICINE

## 2020-09-26 PROCEDURE — 87070 CULTURE OTHR SPECIMN AEROBIC: CPT

## 2020-09-26 RX ORDER — DEXAMETHASONE SODIUM PHOSPHATE 10 MG/ML
10 INJECTION INTRAMUSCULAR; INTRAVENOUS
Status: COMPLETED | OUTPATIENT
Start: 2020-09-26 | End: 2020-09-26

## 2020-09-26 RX ORDER — VALACYCLOVIR HYDROCHLORIDE 1 G/1
1000 TABLET, FILM COATED ORAL DAILY
COMMUNITY
End: 2022-03-08 | Stop reason: ALTCHOICE

## 2020-09-26 RX ADMIN — DEXAMETHASONE SODIUM PHOSPHATE 10 MG: 10 INJECTION, SOLUTION INTRAMUSCULAR; INTRAVENOUS at 08:50

## 2020-09-26 NOTE — ED TRIAGE NOTES
Pt states she had itching in her throat yesterday. Woke up today with right throat swelling and \"it is pushing uvula over\". Pt denies any tongue or lip swelling or fever. Pt does report her voice sound muffled. Denies any pain.

## 2020-09-26 NOTE — DISCHARGE INSTRUCTIONS
Patient Education        Allergies: Care Instructions  Your Care Instructions     Allergies occur when your body's defense system (immune system) overreacts to certain substances. The immune system treats a harmless substance as if it were a harmful germ or virus. Many things can make this happen. These include pollens, medicine, food, dust, animal dander, and mold. Allergies can be mild or severe. Mild allergies can be managed with home treatment. But medicine may be needed to prevent problems. Managing your allergies is an important part of staying healthy. Your doctor may suggest that you have allergy testing to help find out what is causing your allergies. Severe allergies can cause reactions that affect your whole body (anaphylactic reactions). Your doctor may prescribe a shot of epinephrine to carry with you in case you have a severe reaction. Learn how to give yourself the shot and keep it with you at all times. Make sure it is not . Follow-up care is a key part of your treatment and safety. Be sure to make and go to all appointments, and call your doctor if you are having problems. It's also a good idea to know your test results and keep a list of the medicines you take. How can you care for yourself at home? · If you have been told by your doctor that dust or dust mites are causing your allergy, decrease the dust around your bed:  ? Wash sheets, pillowcases, and other bedding in hot water every week. ? Use dust-proof covers for pillows, duvets, and mattresses. Avoid plastic covers because they tear easily and do not \"breathe. \" Wash as instructed on the label. ? Do not use any blankets and pillows that you do not need. ? Use blankets that you can wash in your washing machine. ? Consider removing drapes and carpets, which attract and hold dust, from your bedroom. · If you are allergic to house dust and mites, do not use home humidifiers.  Your doctor can suggest ways you can control dust and mites.  · Look for signs of cockroaches. Cockroaches cause allergic reactions. Use cockroach baits to get rid of them. Then, clean your home well. Cockroaches like areas where grocery bags, newspapers, empty bottles, or cardboard boxes are stored. Do not keep these inside your home, and keep trash and food containers sealed. Seal off any spots where cockroaches might enter your home. · If you are allergic to mold, get rid of furniture, rugs, and drapes that smell musty. Check for mold in the bathroom. · If you are allergic to outdoor pollen or mold spores, use air-conditioning. Change or clean all filters every month. Keep windows closed. · If you are allergic to pollen, stay inside when pollen counts are high. Use a vacuum  with a HEPA filter or a double-thickness filter at least two times each week. · Stay inside when air pollution is bad. Avoid paint fumes, perfumes, and other strong odors. · Avoid conditions that make your allergies worse. Stay away from smoke. Do not smoke or let anyone else smoke in your house. Do not use fireplaces or wood-burning stoves. · If you are allergic to your pets, change the air filter in your furnace every month. Use high-efficiency filters. · If you are allergic to pet dander, keep pets outside or out of your bedroom. Old carpet and cloth furniture can hold a lot of animal dander. You may need to replace them. When should you call for help? Give an epinephrine shot if:    · You think you are having a severe allergic reaction.     · You have symptoms in more than one body area, such as mild nausea and an itchy mouth. After giving an epinephrine shot call 911, even if you feel better. Call 911 if:    · You have symptoms of a severe allergic reaction. These may include:  ? Sudden raised, red areas (hives) all over your body. ? Swelling of the throat, mouth, lips, or tongue. ? Trouble breathing. ? Passing out (losing consciousness).  Or you may feel very lightheaded or suddenly feel weak, confused, or restless.     · You have been given an epinephrine shot, even if you feel better. Call your doctor now or seek immediate medical care if:    · You have symptoms of an allergic reaction, such as:  ? A rash or hives (raised, red areas on the skin). ? Itching. ? Swelling. ? Belly pain, nausea, or vomiting. Watch closely for changes in your health, and be sure to contact your doctor if:    · You do not get better as expected. Where can you learn more? Go to http://www.hollingsworth.com/  Enter W171 in the search box to learn more about \"Allergies: Care Instructions. \"  Current as of: June 29, 2020               Content Version: 12.6  © 6389-0950 [employer]. Care instructions adapted under license by [employer] (which disclaims liability or warranty for this information). If you have questions about a medical condition or this instruction, always ask your healthcare professional. [place] [address] any warranty or liability for your use of this information.

## 2020-09-26 NOTE — ED PROVIDER NOTES
Please note that this dictation was completed with [employer], the computer voice recognition software.  Quite often unanticipated grammatical, syntax, homophones, and other interpretive errors are inadvertently transcribed by the computer software.  Please disregard these errors.  Please excuse any errors that have escaped final proofreading. 60-year-old female past medical history remarkable for chlamydia, chronic kidney disease, hyperglycemia, generalized anxiety disorder with panic attacks, GERD, headache, high risk heterosexual behavior, herpes simplex virus infection, hypokalemia, trichomoniasis and a previous diagnosis of a psychotic disorder presents the ER complaining of \"throat started itching a bit yesterday this morning I woke up and I noticed my tonsils are swollen. I tried to drink something and it felt like they were swollen though I was able to drink. Have not had anything to eat today because I was anxious about it so he came to the ER to be evaluated further. \"  Patient denies overt throat pain fevers chills other current systemic complaints she also denies history of seasonal allergies but says her throat does still feel \"itchy. \"  Patient has she slept just fine last night did not have any trouble breathing or swallowing, \" it just felt funny when she did swallow. \"    pt denies HA, vison changes, diff swallowing, CP, SOB, Abd pain, F/Ch, N/V, D/Cons or other current systemic complaints    Social/ PSH reviewed in EMR    EMR Chart Reviewed           Past Medical History:   Diagnosis Date    Chlamydia     Chronic kidney disease     Endocrine disease     hypoglycemia    Generalized anxiety disorder with panic attacks 7/15/2020    GERD (gastroesophageal reflux disease)     Headache     High risk heterosexual behavior 7/15/2020    HSV (herpes simplex virus) anogenital infection 7/13/2020    Hypokalemia     Kidney disease     \"kidney failure a few years ago\"    Psychotic disorder ([facility] [address]     Trichomonal infection        No past surgical history on file.       Family History:   Problem Relation Age of Onset    Migraines Mother     Hypertension Father     Hypertension Maternal Uncle     Diabetes Paternal Aunt     Diabetes Paternal Uncle     Hypertension Paternal Grandfather     Stroke Paternal Grandfather        Social History     Socioeconomic History    Marital status: SINGLE     Spouse name: Not on file    Number of children: Not on file    Years of education: Not on file    Highest education level: Not on file   Occupational History    Not on file   Social Needs    Financial resource strain: Not hard at all   [place] insecurity     Worry: Never true     Inability: Never true   [employer] needs     Medical: No     Non-medical: No   Tobacco Use    Smoking status: Never Smoker    Smokeless tobacco: Never Used   Substance and Sexual Activity    Alcohol use: No    Drug use: No    Sexual activity: Yes     Partners: Male     Birth control/protection: Condom   Lifestyle    Physical activity     Days per week: Patient refused     Minutes per session: Patient refused    Stress: Not at all   Relationships    Social connections     Talks on phone: More than three times a week     Gets together: More than three times a week     Attends Jewish service: Not on file     Active member of club or organization: No     Attends meetings of clubs or organizations: Never     Relationship status: Living with partner    Intimate partner violence     Fear of current or ex partner: No     Emotionally abused: No     Physically abused: No     Forced sexual activity: No   Other Topics Concern     Service Not Asked    Blood Transfusions Not Asked    Caffeine Concern Not Asked    Occupational Exposure Not Asked    Hobby Hazards Not Asked    Sleep Concern Not Asked    Stress Concern Not Asked    Weight Concern Not Asked    Special Diet Not Asked    Back Care Not Asked    Exercise Not Asked    Bike Helmet Not Asked   [facility] Not Asked    Self-Exams Not Asked   Social History Narrative    Not on file         ALLERGIES: Penicillins and Vancomycin    Review of Systems   Constitutional: Negative for appetite change, chills and fever. HENT: Positive for trouble swallowing. Negative for drooling, rhinorrhea, sore throat and voice change. Eyes: Negative for itching and visual disturbance. Respiratory: Negative for cough, choking, chest tightness, shortness of breath, wheezing and stridor. Cardiovascular: Negative for chest pain, palpitations and leg swelling. Gastrointestinal: Negative for abdominal pain, diarrhea, nausea and vomiting. Genitourinary: Negative for dysuria. Skin: Negative for rash. Neurological: Negative for facial asymmetry and speech difficulty. All other systems reviewed and are negative. There were no vitals filed for this visit. Physical Exam  Vitals signs and nursing note reviewed. Constitutional:       Appearance: Normal appearance. She is well-developed. Comments: NAD, AxOx4, speaking in complete sentences     HENT:      Head: Normocephalic and atraumatic. No right periorbital erythema or left periorbital erythema. Jaw: No trismus, tenderness, swelling, pain on movement or malocclusion. Salivary Glands: Right salivary gland is not diffusely enlarged. Left salivary gland is not diffusely enlarged or tender. Right Ear: External ear normal.      Left Ear: External ear normal.      Nose: No nasal tenderness. Right Nostril: No epistaxis. Left Nostril: No epistaxis. Right Turbinates: Not enlarged, swollen or pale. Left Turbinates: Not enlarged, swollen or pale. Mouth/Throat:      Lips: Pink. No lesions. Mouth: Mucous membranes are moist. No injury, lacerations, oral lesions or angioedema. Dentition: Normal dentition. Does not have dentures.  No dental tenderness, gingival swelling, dental caries, dental abscesses or gum lesions. Tongue: No lesions. Tongue does not deviate from midline. Palate: No mass and lesions. Pharynx: Uvula midline. Pharyngeal swelling and posterior oropharyngeal erythema present. No oropharyngeal exudate or uvula swelling. Tonsils: No tonsillar exudate or tonsillar abscesses. 4+ on the right. 4+ on the left. Eyes:      General: No scleral icterus. Right eye: No discharge. Conjunctiva/sclera: Conjunctivae normal.      Pupils: Pupils are equal, round, and reactive to light. Neck:      Musculoskeletal: Normal range of motion and neck supple. Vascular: No JVD. Trachea: No tracheal deviation. Cardiovascular:      Rate and Rhythm: Normal rate and regular rhythm. Heart sounds: Normal heart sounds. No murmur. No friction rub. No gallop. Pulmonary:      Effort: Pulmonary effort is normal. No respiratory distress. Breath sounds: Normal breath sounds. No wheezing or rales. Chest:      Chest wall: No tenderness. Abdominal:      General: Bowel sounds are normal.      Palpations: Abdomen is soft. Tenderness: There is no abdominal tenderness. There is no guarding or rebound. Genitourinary:     Vagina: No vaginal discharge. Musculoskeletal: Normal range of motion. General: No tenderness. Skin:     General: Skin is warm and dry. Coloration: Skin is not pale. Findings: No erythema or rash. Neurological:      Mental Status: She is alert and oriented to person, place, and time. Cranial Nerves: No cranial nerve deficit. Motor: No abnormal muscle tone. Coordination: Coordination normal.   Psychiatric:         Behavior: Behavior normal.         Thought Content: Thought content normal.          MDM       Procedures    9:20 AM  Lori Rooney  results have been reviewed with her. She has been counseled regarding her diagnosis.   She verbally conveys understanding and agreement of the signs, symptoms, diagnosis, treatment and prognosis and additionally agrees to Call/ Arrange follow up as recommended with Dr. Sherlene Lennox, MD in 24 - 48 hours. She also agrees with the care-plan and conveys that all of her questions have been answered. I have also put together some discharge instructions for her that include: 1) educational information regarding their diagnosis, 2) how to care for their diagnosis at home, as well a 3) list of reasons why they would want to return to the ED prior to their follow-up appointment, should their condition change or for concerns.

## 2020-09-28 LAB
BACTERIA SPEC CULT: NORMAL
SERVICE CMNT-IMP: NORMAL

## 2020-11-11 NOTE — ED NOTES
CERTIFICATE OF WORK    11/11/2020      RE:  Lola Contreras  [address]  [place] [address]      This is to certify that Lola SALTER Diane has been under my care from 11/11/20 and can return to regular work on 11/12/2020 without restrictions.                    SIGNATURE:_________________________________________________, 11/11/2020    ELVIA Loja        [address] [place] [address]   [place] [id number] [phone]   I have reviewed discharge instructions with the patient. The patient verbalized understanding.

## 2020-12-03 ENCOUNTER — HOSPITAL ENCOUNTER (EMERGENCY)
Age: 25
Discharge: LEFT BEFORE DISCHARGE | End: 2020-12-03
Attending: EMERGENCY MEDICINE
Payer: COMMERCIAL

## 2020-12-03 VITALS
SYSTOLIC BLOOD PRESSURE: 124 MMHG | RESPIRATION RATE: 18 BRPM | DIASTOLIC BLOOD PRESSURE: 74 MMHG | HEIGHT: 62 IN | WEIGHT: 119.05 LBS | HEART RATE: 71 BPM | OXYGEN SATURATION: 100 % | BODY MASS INDEX: 21.91 KG/M2 | TEMPERATURE: 98.5 F

## 2020-12-03 PROCEDURE — 75810000275 HC EMERGENCY DEPT VISIT NO LEVEL OF CARE

## 2020-12-04 ENCOUNTER — HOSPITAL ENCOUNTER (EMERGENCY)
Age: 25
Discharge: HOME | End: 2020-12-04
Attending: EMERGENCY MEDICINE
Payer: COMMERCIAL

## 2020-12-04 ENCOUNTER — APPOINTMENT (OUTPATIENT)
Dept: GENERAL RADIOLOGY | Age: 25
End: 2020-12-04
Attending: STUDENT IN AN ORGANIZED HEALTH CARE EDUCATION/TRAINING PROGRAM
Payer: COMMERCIAL

## 2020-12-04 VITALS
BODY MASS INDEX: 21.95 KG/M2 | TEMPERATURE: 98 F | DIASTOLIC BLOOD PRESSURE: 78 MMHG | RESPIRATION RATE: 14 BRPM | HEIGHT: 62 IN | OXYGEN SATURATION: 100 % | SYSTOLIC BLOOD PRESSURE: 131 MMHG | HEART RATE: 75 BPM | WEIGHT: 119.27 LBS

## 2020-12-04 DIAGNOSIS — R51.9 ACUTE NONINTRACTABLE HEADACHE, UNSPECIFIED HEADACHE TYPE: ICD-10-CM

## 2020-12-04 DIAGNOSIS — R11.0 NAUSEA: ICD-10-CM

## 2020-12-04 DIAGNOSIS — Z20.828 EXPOSURE TO SARS-ASSOCIATED CORONAVIRUS: Primary | ICD-10-CM

## 2020-12-04 DIAGNOSIS — M79.10 MYALGIA: ICD-10-CM

## 2020-12-04 DIAGNOSIS — R19.7 DIARRHEA, UNSPECIFIED TYPE: ICD-10-CM

## 2020-12-04 PROCEDURE — 71045 X-RAY EXAM CHEST 1 VIEW: CPT

## 2020-12-04 PROCEDURE — 87635 SARS-COV-2 COVID-19 AMP PRB: CPT

## 2020-12-04 PROCEDURE — 99282 EMERGENCY DEPT VISIT SF MDM: CPT

## 2020-12-04 RX ORDER — ONDANSETRON 4 MG/1
4 TABLET, ORALLY DISINTEGRATING ORAL
Qty: 20 TAB | Refills: 0 | Status: SHIPPED | OUTPATIENT
Start: 2020-12-04 | End: 2022-03-08 | Stop reason: ALTCHOICE

## 2020-12-04 RX ORDER — ASCORBIC ACID 500 MG
500 TABLET ORAL 2 TIMES DAILY
Qty: 10 TAB | Refills: 0 | Status: SHIPPED | OUTPATIENT
Start: 2020-12-04 | End: 2022-03-08 | Stop reason: ALTCHOICE

## 2020-12-04 RX ORDER — UREA 10 %
220 LOTION (ML) TOPICAL DAILY
Qty: 5 TAB | Refills: 0 | Status: SHIPPED | OUTPATIENT
Start: 2020-12-04

## 2020-12-04 NOTE — ED PROVIDER NOTES
EMERGENCY DEPARTMENT HISTORY AND PHYSICAL EXAM      Date: 12/4/2020  Patient Name: Bea Virk    History of Presenting Illness     Chief Complaint   Patient presents with    Concern For COVID-19 (Coronavirus)     Pt states she was exposed to [address] about a week ago. Pt states she came to ER to get tested because other placed who do covid testing didnt accept her insurance. Pt states she has been having a HA, diarrhea, and nausea       History Provided By: Patient    HPI: Bea Virk, 26 y.o. 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 She denies any fevers, chills but does endorse some generalized body aches. She does endorse some nausea without vomiting as well as some diarrhea. She endorses a dry hacking cough but denies any shortness of breath or chest pain. There are no other complaints, changes, or physical findings at this time. PCP: Kei Godfrey MD    No current facility-administered medications on file prior to encounter. Current Outpatient Medications on File Prior to Encounter   Medication Sig Dispense Refill    valACYclovir (Valtrex) 1 gram tablet Take 1,000 mg by mouth daily.  acetaminophen (TYLENOL) 325 mg tablet Take 2 Tabs by mouth every six (6) hours as needed for Pain or Fever.  12 Tab 0       Past History     Past Medical History:  Past Medical History:   Diagnosis Date    Chlamydia     Chronic kidney disease     Endocrine disease     hypoglycemia    Generalized anxiety disorder with panic attacks 7/15/2020    GERD (gastroesophageal reflux disease)     Headache     High risk heterosexual behavior 7/15/2020    HSV (herpes simplex virus) anogenital infection 7/13/2020    Hypokalemia     Kidney disease     \"kidney failure a few years ago\"    Psychotic disorder ([facility] [address]     Trichomonal infection        Past Surgical History:  No past surgical history on file. Family History:  Family History   Problem Relation Age of Onset    Migraines Mother     Hypertension Father     Hypertension Maternal Uncle     Diabetes Paternal Aunt     Diabetes Paternal Uncle     Hypertension Paternal Grandfather     Stroke Paternal Grandfather        Social History:  Social History     Tobacco Use    Smoking status: Never Smoker    Smokeless tobacco: Never Used   Substance Use Topics    Alcohol use: No    Drug use: No       Allergies: Allergies   Allergen Reactions    Penicillins Hives    Vancomycin Itching and Other (comments)     Lips went numb, \"swollen throat\" and itchy from her \"scalp to her legs\"         Review of Systems   Review of Systems   Constitutional: Negative for appetite change, chills and fever. HENT: Negative. Eyes: Negative for visual disturbance. Respiratory: Positive for cough. Negative for chest tightness and shortness of breath. Cardiovascular: Negative for chest pain and leg swelling. Gastrointestinal: Positive for diarrhea and nausea. Negative for abdominal pain and vomiting. Genitourinary: Negative. Musculoskeletal: Positive for myalgias. Negative for back pain and gait problem. Skin: Negative for color change and rash. Neurological: Positive for headaches. Negative for dizziness, weakness, light-headedness and numbness. All other systems reviewed and are negative. Physical Exam   Physical Exam  Vitals signs and nursing note reviewed. Constitutional:       General: She is not in acute distress. Appearance: Normal appearance. She is not ill-appearing, toxic-appearing or diaphoretic. HENT:      Head: Normocephalic and atraumatic.    Neck:      Musculoskeletal: Normal range of motion and neck supple. No neck rigidity or muscular tenderness. Cardiovascular:      Rate and Rhythm: Normal rate and regular rhythm. Heart sounds: Normal heart sounds. No murmur. Pulmonary:      Effort: Pulmonary effort is normal. No respiratory distress. Breath sounds: Normal breath sounds. No wheezing. Abdominal:      Palpations: Abdomen is soft. Tenderness: There is no abdominal tenderness. There is no guarding or rebound. Skin:     General: Skin is warm and dry. Findings: No erythema or rash. Neurological:      General: No focal deficit present. Mental Status: She is alert and oriented to person, place, and time. Diagnostic Study Results     Labs -  Labs Reviewed   SARS-COV-2       Radiologic Studies -   XR CHEST PORT   Final Result   IMPRESSION: Normal chest.        CT Results  (Last 48 hours)    None        CXR Results  (Last 48 hours)               12/04/20 1020  XR CHEST PORT Final result    Impression:  IMPRESSION: Normal chest.       Narrative:  EXAM: XR CHEST PORT       INDICATION: Throat swelling. [id number] concern       COMPARISON: 12/20/2016       FINDINGS: A portable AP radiograph of the chest was obtained at 1014 hours. The   lungs are clear. The cardiac and mediastinal contours and pulmonary vascularity   are normal. An S-shaped scoliosis is present. Medical Decision Making   I am the first provider for this patient. I reviewed the vital signs, available nursing notes, past medical history, past surgical history, family history and social history. Vital Signs-Reviewed the patient's vital signs.   Visit Vitals  /78   Pulse 75   Temp 98 °F (36.7 °C)   Resp 14   Ht 5' 2\" (1.575 m)   Wt 54.1 kg (119 lb 4.3 oz)   SpO2 100%   BMI 21.81 kg/m²       Records Reviewed: Nursing Notes    Provider Notes (Medical Decision Making):   70-year-old female here with generalized body aches, cough, generalized headache after exposure to COVID-19 infection at work. On exam appears clinically well and nontoxic. Afebrile vital signs stable. No nuchal rigidity, no meningeal symptoms. No respiratory distress. Chest x-ray does not show any acute process. Will obtain SARS-CoV-2 swab and encouraged isolation at home as per [facility] guidelines. Patient given strict return precautions including shortness of breath, all questions answered and she agrees with plan as above. ED Course:   Initial assessment performed. The patients presenting problems have been discussed, and they are in agreement with the care plan formulated and outlined with them. I have encouraged them to ask questions as they arise throughout their visit. Discharge Note:  The patient has been re-evaluated and is ready for discharge. Reviewed available results with patient. Counseled patient on diagnosis and care plan. Patient has expressed understanding, and all questions have been answered. Patient agrees with plan and agrees to follow up as recommended, or to return to the ED if their symptoms worsen. Discharge instructions have been provided and explained to the patient, along with reasons to return to the ED. Disposition:  Discharge    DISCHARGE PLAN:  1. Discharge Medication List as of 12/4/2020 12:01 PM      START taking these medications    Details   ondansetron (ZOFRAN ODT) 4 mg disintegrating tablet Take 1 Tab by mouth every eight (8) hours as needed for Nausea or Vomiting., Normal, Disp-20 Tab,R-0      ascorbic acid, vitamin C, (Vitamin C) 500 mg tablet Take 1 Tab by mouth two (2) times a day., Normal, Disp-10 Tab,R-0      zinc sulfate 220 mg tablet Take 1 Tab by mouth daily. , Normal, Disp-5 Tab,R-0         CONTINUE these medications which have NOT CHANGED    Details   valACYclovir (Valtrex) 1 gram tablet Take 1,000 mg by mouth daily. , Historical Med      acetaminophen (TYLENOL) 325 mg tablet Take 2 Tabs by mouth every six (6) hours as needed for Pain or Fever., No Print, Disp-12 Tab,R-0           2. Follow-up Information     Follow up With Specialties Details Why Contact Info    Siobhan Vázquez MD [facility] Medicine Schedule an appointment as soon as possible for a visit   [address] [facility] [address] [facility] [address].  [phone]      [facility] EMERGENCY DEPT Emergency Medicine Go to  As needed, If symptoms worsen [address] [facility]  [address] [facility]  [phone]        3. Return to ED if worse     Diagnosis     Clinical Impression:   1. Exposure to SARS-associated coronavirus    2. Acute nonintractable headache, unspecified headache type    3. Nausea    4. Diarrhea, unspecified type    5. Myalgia        Attestations:    Beena Ramírez MD    Please note that this dictation was completed with [employer], the computer voice recognition software. Quite often unanticipated grammatical, syntax, homophones, and other interpretive errors are inadvertently transcribed by the computer software. Please disregard these errors. Please excuse any errors that have escaped final proofreading. Thank you.

## 2020-12-04 NOTE — LETTER
[address] 
hospitals EMERGENCY DEPT 
[address] [facility] [address] 
[phone] Work/School Note Date: 12/4/2020 To Whom It May concern: 
 
Unknown President was seen and treated today in the ER by the following provider(s): 
Attending Provider: Mehrdad Encarnacion MD.   
 
Unknown President may return to work on 12/11/20. Sincerely, Cara Howard MD

## 2020-12-07 LAB
COVID-19, XGCOVT: NOT DETECTED
HEALTH STATUS, XMCV2T: NORMAL
SOURCE, COVRS: NORMAL
SPECIMEN SOURCE, FCOV2M: NORMAL
SPECIMEN TYPE, XMCV1T: NORMAL

## 2021-03-01 ENCOUNTER — HOSPITAL ENCOUNTER (EMERGENCY)
Age: 26
Discharge: HOME | End: 2021-03-01
Attending: EMERGENCY MEDICINE | Admitting: EMERGENCY MEDICINE
Payer: COMMERCIAL

## 2021-03-01 VITALS
BODY MASS INDEX: 22.39 KG/M2 | DIASTOLIC BLOOD PRESSURE: 68 MMHG | OXYGEN SATURATION: 100 % | HEART RATE: 74 BPM | HEIGHT: 62 IN | SYSTOLIC BLOOD PRESSURE: 132 MMHG | TEMPERATURE: 98.3 F | RESPIRATION RATE: 16 BRPM | WEIGHT: 121.69 LBS

## 2021-03-01 DIAGNOSIS — G44.209 TENSION HEADACHE: Primary | ICD-10-CM

## 2021-03-01 DIAGNOSIS — R11.2 NON-INTRACTABLE VOMITING WITH NAUSEA, UNSPECIFIED VOMITING TYPE: ICD-10-CM

## 2021-03-01 LAB
ALBUMIN SERPL-MCNC: 4.3 G/DL (ref 3.5–5)
ALBUMIN/GLOB SERPL: 1.2 {RATIO} (ref 1.1–2.2)
ALP SERPL-CCNC: 33 U/L (ref 45–117)
ALT SERPL-CCNC: 15 U/L (ref 12–78)
ANION GAP SERPL CALC-SCNC: 4 MMOL/L (ref 5–15)
APPEARANCE UR: CLEAR
AST SERPL-CCNC: 13 U/L (ref 15–37)
BACTERIA URNS QL MICRO: NEGATIVE /HPF
BASOPHILS # BLD: 0 K/UL (ref 0–0.1)
BASOPHILS NFR BLD: 1 % (ref 0–1)
BILIRUB SERPL-MCNC: 0.7 MG/DL (ref 0.2–1)
BILIRUB UR QL: NEGATIVE
BUN SERPL-MCNC: 7 MG/DL (ref 6–20)
BUN/CREAT SERPL: 10 (ref 12–20)
CALCIUM SERPL-MCNC: 9.1 MG/DL (ref 8.5–10.1)
CHLORIDE SERPL-SCNC: 109 MMOL/L (ref 97–108)
CO2 SERPL-SCNC: 27 MMOL/L (ref 21–32)
COLOR UR: NORMAL
CREAT SERPL-MCNC: 0.7 MG/DL (ref 0.55–1.02)
DIFFERENTIAL METHOD BLD: NORMAL
EOSINOPHIL # BLD: 0 K/UL (ref 0–0.4)
EOSINOPHIL NFR BLD: 1 % (ref 0–7)
EPITH CASTS URNS QL MICRO: NORMAL /LPF
ERYTHROCYTE [DISTWIDTH] IN BLOOD BY AUTOMATED COUNT: 11.8 % (ref 11.5–14.5)
GLOBULIN SER CALC-MCNC: 3.7 G/DL (ref 2–4)
GLUCOSE SERPL-MCNC: 87 MG/DL (ref 65–100)
GLUCOSE UR STRIP.AUTO-MCNC: NEGATIVE MG/DL
HCG UR QL: NEGATIVE
HCT VFR BLD AUTO: 36.3 % (ref 35–47)
HGB BLD-MCNC: 12.2 G/DL (ref 11.5–16)
HGB UR QL STRIP: NEGATIVE
IMM GRANULOCYTES # BLD AUTO: 0 K/UL (ref 0–0.04)
IMM GRANULOCYTES NFR BLD AUTO: 0 % (ref 0–0.5)
KETONES UR QL STRIP.AUTO: NEGATIVE MG/DL
LEUKOCYTE ESTERASE UR QL STRIP.AUTO: NEGATIVE
LYMPHOCYTES # BLD: 1.9 K/UL (ref 0.8–3.5)
LYMPHOCYTES NFR BLD: 36 % (ref 12–49)
MCH RBC QN AUTO: 30.4 PG (ref 26–34)
MCHC RBC AUTO-ENTMCNC: 33.6 G/DL (ref 30–36.5)
MCV RBC AUTO: 90.5 FL (ref 80–99)
MONOCYTES # BLD: 0.4 K/UL (ref 0–1)
MONOCYTES NFR BLD: 7 % (ref 5–13)
NEUTS SEG # BLD: 2.9 K/UL (ref 1.8–8)
NEUTS SEG NFR BLD: 55 % (ref 32–75)
NITRITE UR QL STRIP.AUTO: NEGATIVE
NRBC # BLD: 0 K/UL (ref 0–0.01)
NRBC BLD-RTO: 0 PER 100 WBC
PH UR STRIP: 7.5 [PH] (ref 5–8)
PLATELET # BLD AUTO: 253 K/UL (ref 150–400)
PMV BLD AUTO: 10.7 FL (ref 8.9–12.9)
POTASSIUM SERPL-SCNC: 4.1 MMOL/L (ref 3.5–5.1)
PROT SERPL-MCNC: 8 G/DL (ref 6.4–8.2)
PROT UR STRIP-MCNC: NEGATIVE MG/DL
RBC # BLD AUTO: 4.01 M/UL (ref 3.8–5.2)
RBC #/AREA URNS HPF: NORMAL /HPF (ref 0–5)
SODIUM SERPL-SCNC: 140 MMOL/L (ref 136–145)
SP GR UR REFRACTOMETRY: 1.01 (ref 1–1.03)
UA: UC IF INDICATED,UAUC: NORMAL
UROBILINOGEN UR QL STRIP.AUTO: 0.2 EU/DL (ref 0.2–1)
WBC # BLD AUTO: 5.2 K/UL (ref 3.6–11)
WBC URNS QL MICRO: NORMAL /HPF (ref 0–4)

## 2021-03-01 PROCEDURE — 81025 URINE PREGNANCY TEST: CPT

## 2021-03-01 PROCEDURE — 96374 THER/PROPH/DIAG INJ IV PUSH: CPT

## 2021-03-01 PROCEDURE — 81001 URINALYSIS AUTO W/SCOPE: CPT

## 2021-03-01 PROCEDURE — 36415 COLL VENOUS BLD VENIPUNCTURE: CPT

## 2021-03-01 PROCEDURE — 85025 COMPLETE CBC W/AUTO DIFF WBC: CPT

## 2021-03-01 PROCEDURE — 80053 COMPREHEN METABOLIC PANEL: CPT

## 2021-03-01 PROCEDURE — 99283 EMERGENCY DEPT VISIT LOW MDM: CPT

## 2021-03-01 PROCEDURE — 74011250636 HC RX REV CODE- 250/636: Performed by: EMERGENCY MEDICINE

## 2021-03-01 PROCEDURE — 74011250637 HC RX REV CODE- 250/637: Performed by: EMERGENCY MEDICINE

## 2021-03-01 RX ORDER — ACETAMINOPHEN 500 MG
1000 TABLET ORAL
Status: COMPLETED | OUTPATIENT
Start: 2021-03-01 | End: 2021-03-01

## 2021-03-01 RX ORDER — ONDANSETRON 2 MG/ML
4 INJECTION INTRAMUSCULAR; INTRAVENOUS
Status: COMPLETED | OUTPATIENT
Start: 2021-03-01 | End: 2021-03-01

## 2021-03-01 RX ORDER — KETOROLAC TROMETHAMINE 30 MG/ML
15 INJECTION, SOLUTION INTRAMUSCULAR; INTRAVENOUS
Status: DISCONTINUED | OUTPATIENT
Start: 2021-03-01 | End: 2021-03-01 | Stop reason: HOSPADM

## 2021-03-01 RX ORDER — ONDANSETRON 4 MG/1
4 TABLET, ORALLY DISINTEGRATING ORAL
Qty: 10 TAB | Refills: 0 | Status: SHIPPED | OUTPATIENT
Start: 2021-03-01 | End: 2022-03-08 | Stop reason: ALTCHOICE

## 2021-03-01 RX ADMIN — ACETAMINOPHEN 1000 MG: 500 TABLET ORAL at 12:25

## 2021-03-01 RX ADMIN — ONDANSETRON 4 MG: 2 INJECTION INTRAMUSCULAR; INTRAVENOUS at 11:59

## 2021-03-01 RX ADMIN — SODIUM CHLORIDE 1000 ML: 9 INJECTION, SOLUTION INTRAVENOUS at 12:03

## 2021-03-01 NOTE — ED PROVIDER NOTES
EMERGENCY DEPARTMENT HISTORY AND PHYSICAL EXAM      Date: 3/1/2021  Patient Name: Florentino Frederick    History of Presenting Illness     Chief Complaint   Patient presents with    Headache     sx since last night. pt had covid back in january. pt had chills but denies fever. one episode of vomiting today    Nausea    Vomiting       History Provided By: Patient    HPI: Florentino Frederick, 23 y.o. female with PMHx significant for GERD, anxiety, who presents with a chief complaint of headache, nausea, vomiting, and diarrhea. Patient describes her headache as left-sided, progressively worsening, throbbing. Headache began this morning. Is also complaining of nausea, vomiting, diarrhea that began last night. Denies any fever, chest pain, shortness of breath, neck pain or stiffness. PCP: Patrica Maurice MD    There are no other complaints, changes, or physical findings at this time. Current Facility-Administered Medications   Medication Dose Route Frequency Provider Last Rate Last Admin    ketorolac (TORADOL) injection 15 mg  15 mg IntraVENous NOW Ap Chun MD         Current Outpatient Medications   Medication Sig Dispense Refill    ondansetron (Zofran ODT) 4 mg disintegrating tablet Take 1 Tab by mouth every eight (8) hours as needed for Nausea. 10 Tab 0    ondansetron (ZOFRAN ODT) 4 mg disintegrating tablet Take 1 Tab by mouth every eight (8) hours as needed for Nausea or Vomiting. 20 Tab 0    ascorbic acid, vitamin C, (Vitamin C) 500 mg tablet Take 1 Tab by mouth two (2) times a day. 10 Tab 0    zinc sulfate 220 mg tablet Take 1 Tab by mouth daily. 5 Tab 0    valACYclovir (Valtrex) 1 gram tablet Take 1,000 mg by mouth daily.  acetaminophen (TYLENOL) 325 mg tablet Take 2 Tabs by mouth every six (6) hours as needed for Pain or Fever.  12 Tab 0     Past History     Past Medical History:  Past Medical History:   Diagnosis Date    Chlamydia     Chronic kidney disease     Endocrine disease     hypoglycemia    Generalized anxiety disorder with panic attacks 7/15/2020    GERD (gastroesophageal reflux disease)     Headache     High risk heterosexual behavior 7/15/2020    HSV (herpes simplex virus) anogenital infection 7/13/2020    Hypokalemia     Kidney disease     \"kidney failure a few years ago\"    Psychotic disorder ([address]     Trichomonal infection      Past Surgical History:  No past surgical history on file. Family History:  Family History   Problem Relation Age of Onset    Migraines Mother     Hypertension Father     Hypertension Maternal Uncle     Diabetes Paternal Aunt     Diabetes Paternal Uncle     Hypertension Paternal Grandfather     Stroke Paternal Grandfather      Social History:  Social History     Tobacco Use    Smoking status: Never Smoker    Smokeless tobacco: Never Used   Substance Use Topics    Alcohol use: No    Drug use: No     Allergies: Allergies   Allergen Reactions    Penicillins Hives    Vancomycin Itching and Other (comments)     Lips went numb, \"swollen throat\" and itchy from her \"scalp to her legs\"     Review of Systems   Review of Systems   Constitutional: Negative for chills and fever. HENT: Negative for congestion, rhinorrhea and sore throat. Respiratory: Negative for cough and shortness of breath. Cardiovascular: Negative for chest pain. Gastrointestinal: Positive for diarrhea, nausea and vomiting. Negative for abdominal pain. Genitourinary: Negative for dysuria and urgency. Skin: Negative for rash. Neurological: Positive for headaches. Negative for dizziness and light-headedness. All other systems reviewed and are negative. Physical Exam   Physical Exam  Vitals signs and nursing note reviewed. Constitutional:       General: She is not in acute distress. Appearance: She is well-developed. HENT:      Head: Normocephalic and atraumatic.    Eyes:      Conjunctiva/sclera: Conjunctivae normal.      Pupils: Pupils are equal, round, and reactive to light. Neck:      Musculoskeletal: Normal range of motion. Comments: No meningismus  Cardiovascular:      Rate and Rhythm: Normal rate and regular rhythm. Pulmonary:      Effort: Pulmonary effort is normal. No respiratory distress. Breath sounds: Normal breath sounds. No stridor. Abdominal:      General: There is no distension. Palpations: Abdomen is soft. Tenderness: There is no abdominal tenderness. Musculoskeletal: Normal range of motion. Skin:     General: Skin is warm and dry. Neurological:      General: No focal deficit present. Mental Status: She is alert and oriented to person, place, and time. Gait: Gait is intact. Diagnostic Study Results   Labs -     Recent Results (from the past 12 hour(s))   CBC WITH AUTOMATED DIFF    Collection Time: 03/01/21 11:33 AM   Result Value Ref Range    WBC 5.2 3.6 - 11.0 K/uL    RBC 4.01 3.80 - 5.20 M/uL    HGB 12.2 11.5 - 16.0 g/dL    HCT 36.3 35.0 - 47.0 %    MCV 90.5 80.0 - 99.0 FL    MCH 30.4 26.0 - 34.0 PG    MCHC 33.6 30.0 - 36.5 g/dL    RDW 11.8 11.5 - 14.5 %    PLATELET 215 189 - 714 K/uL    MPV 10.7 8.9 - 12.9 FL    NRBC 0.0 0  WBC    ABSOLUTE NRBC 0.00 0.00 - 0.01 K/uL    NEUTROPHILS 55 32 - 75 %    LYMPHOCYTES 36 12 - 49 %    MONOCYTES 7 5 - 13 %    EOSINOPHILS 1 0 - 7 %    BASOPHILS 1 0 - 1 %    IMMATURE GRANULOCYTES 0 0.0 - 0.5 %    ABS. NEUTROPHILS 2.9 1.8 - 8.0 K/UL    ABS. LYMPHOCYTES 1.9 0.8 - 3.5 K/UL    ABS. MONOCYTES 0.4 0.0 - 1.0 K/UL    ABS. EOSINOPHILS 0.0 0.0 - 0.4 K/UL    ABS. BASOPHILS 0.0 0.0 - 0.1 K/UL    ABS. IMM.  GRANS. 0.0 0.00 - 0.04 K/UL    DF AUTOMATED     METABOLIC PANEL, COMPREHENSIVE    Collection Time: 03/01/21 11:33 AM   Result Value Ref Range    Sodium 140 136 - 145 mmol/L    Potassium 4.1 3.5 - 5.1 mmol/L    Chloride 109 (H) 97 - 108 mmol/L    CO2 27 21 - 32 mmol/L    Anion gap 4 (L) 5 - 15 mmol/L    Glucose 87 65 - 100 mg/dL    BUN 7 6 - 20 MG/DL Creatinine 0.70 0.55 - 1.02 MG/DL    BUN/Creatinine ratio 10 (L) 12 - 20      GFR est AA >60 >60 ml/min/1.73m2    GFR est non-AA >60 >60 ml/min/1.73m2    Calcium 9.1 8.5 - 10.1 MG/DL    Bilirubin, total 0.7 0.2 - 1.0 MG/DL    ALT (SGPT) 15 12 - 78 U/L    AST (SGOT) 13 (L) 15 - 37 U/L    Alk. phosphatase 33 (L) 45 - 117 U/L    Protein, total 8.0 6.4 - 8.2 g/dL    Albumin 4.3 3.5 - 5.0 g/dL    Globulin 3.7 2.0 - 4.0 g/dL    A-G Ratio 1.2 1.1 - 2.2     URINALYSIS W/ REFLEX CULTURE    Collection Time: 03/01/21 11:59 AM    Specimen: Urine   Result Value Ref Range    Color YELLOW/STRAW      Appearance CLEAR CLEAR      Specific gravity 1.010 1.003 - 1.030      pH (UA) 7.5 5.0 - 8.0      Protein Negative NEG mg/dL    Glucose Negative NEG mg/dL    Ketone Negative NEG mg/dL    Bilirubin Negative NEG      Blood Negative NEG      Urobilinogen 0.2 0.2 - 1.0 EU/dL    Nitrites Negative NEG      Leukocyte Esterase Negative NEG      WBC 0-4 0 - 4 /hpf    RBC 0-5 0 - 5 /hpf    Epithelial cells FEW FEW /lpf    Bacteria Negative NEG /hpf    UA:UC IF INDICATED CULTURE NOT INDICATED BY UA RESULT CNI     HCG URINE, QL. - POC    Collection Time: 03/01/21 12:01 PM   Result Value Ref Range    Pregnancy test,urine (POC) Negative NEG         Radiologic Studies -   No orders to display     No results found. Medical Decision Making   I am the first provider for this patient. I reviewed the vital signs, available nursing notes, past medical history, past surgical history, family history and social history. Vital Signs-Reviewed the patient's vital signs. Patient Vitals for the past 12 hrs:   Temp Pulse Resp BP SpO2   03/01/21 1015 98.3 °F (36.8 °C) 74 16 132/68 100 %       Pulse Oximetry Analysis - 100% on ra      Records Reviewed: Nursing Notes and Old Medical Records    Provider Notes (Medical Decision Making):   Patient presents with chief complaint of headache, nausea, vomiting, diarrhea.   On evaluation she is overall well-appearing with stable vital signs. No focal abdominal tenderness on exam to suggest surgical process. Do not feel abdominal imaging needed at this time. Given that her headache is progressively worsening and she has no focal neurologic symptoms I do not feel head imaging is needed at this time. Will check basic lab work, urinalysis, treat symptoms, and reevaluate    ED Course:   Initial assessment performed. The patients presenting problems have been discussed, and they are in agreement with the care plan formulated and outlined with them. I have encouraged them to ask questions as they arise throughout their visit. ED Course as of Mar 01 1520   Mon Mar 01, 2021   1301 Feeling better, given crackers    [CATHRYN]   1351 Patient reports HA is completely gone. Tolerating PO without difficulty. Stable for d/c    [CATHRYN]      ED Course User Index  Stephani Zepeda MD       Procedures:  Procedures    Critical Care:  none    Disposition:  Discharge Note:  The patient has been re-evaluated and is ready for discharge. Reviewed available results with patient. Counseled patient on diagnosis and care plan. Patient has expressed understanding, and all questions have been answered. Patient agrees with plan and agrees to follow up as recommended, or to return to the ED if their symptoms worsen. Discharge instructions have been provided and explained to the patient, along with reasons to return to the ED. PLAN:  1. Discharge Medication List as of 3/1/2021  1:53 PM      START taking these medications    Details   !! ondansetron (Zofran ODT) 4 mg disintegrating tablet Take 1 Tab by mouth every eight (8) hours as needed for Nausea., Normal, Disp-10 Tab, R-0       !! - Potential duplicate medications found. Please discuss with provider.       CONTINUE these medications which have NOT CHANGED    Details   !! ondansetron (ZOFRAN ODT) 4 mg disintegrating tablet Take 1 Tab by mouth every eight (8) hours as needed for Nausea or Vomiting., Normal, Disp-20 Tab,R-0      ascorbic acid, vitamin C, (Vitamin C) 500 mg tablet Take 1 Tab by mouth two (2) times a day., Normal, Disp-10 Tab,R-0      zinc sulfate 220 mg tablet Take 1 Tab by mouth daily. , Normal, Disp-5 Tab,R-0      valACYclovir (Valtrex) 1 gram tablet Take 1,000 mg by mouth daily. , Historical Med      acetaminophen (TYLENOL) 325 mg tablet Take 2 Tabs by mouth every six (6) hours as needed for Pain or Fever., No Print, Disp-12 Tab,R-0       !! - Potential duplicate medications found. Please discuss with provider. 2.   Follow-up Information     Follow up With Specialties Details Why Contact Info    Velma Austin MD [facility] Medicine Schedule an appointment as soon as possible for a visit   [address] [facility] [address]  [facility]  [phone]      hospitals EMERGENCY DEPT Emergency Medicine  As needed, If symptoms worsen [address] [place] [address] Drive  [address] [facility]  [phone]        Return to ED if worse     Diagnosis     Clinical Impression:   1. Tension headache    2. Non-intractable vomiting with nausea, unspecified vomiting type            Please note that this dictation was completed with [employer], the [employer] voice recognition software. Quite often unanticipated grammatical, syntax, homophones, and other interpretive errors are inadvertently transcribed by the computer software. Please disregard these errors.   Please excuse any errors that have escaped final proofreading

## 2021-05-06 ENCOUNTER — APPOINTMENT (OUTPATIENT)
Dept: CT IMAGING | Age: 26
End: 2021-05-06
Attending: EMERGENCY MEDICINE
Payer: COMMERCIAL

## 2021-05-06 ENCOUNTER — HOSPITAL ENCOUNTER (EMERGENCY)
Age: 26
Discharge: HOME | End: 2021-05-06
Attending: EMERGENCY MEDICINE
Payer: COMMERCIAL

## 2021-05-06 VITALS
DIASTOLIC BLOOD PRESSURE: 57 MMHG | WEIGHT: 126.98 LBS | HEIGHT: 62 IN | SYSTOLIC BLOOD PRESSURE: 120 MMHG | BODY MASS INDEX: 23.37 KG/M2 | TEMPERATURE: 97.5 F | RESPIRATION RATE: 17 BRPM | HEART RATE: 62 BPM | OXYGEN SATURATION: 100 %

## 2021-05-06 DIAGNOSIS — R10.31 ABDOMINAL PAIN, RIGHT LOWER QUADRANT: ICD-10-CM

## 2021-05-06 DIAGNOSIS — K59.00 CONSTIPATION, UNSPECIFIED CONSTIPATION TYPE: Primary | ICD-10-CM

## 2021-05-06 LAB
ALBUMIN SERPL-MCNC: 4.2 G/DL (ref 3.5–5)
ALBUMIN/GLOB SERPL: 1.1 {RATIO} (ref 1.1–2.2)
ALP SERPL-CCNC: 36 U/L (ref 45–117)
ALT SERPL-CCNC: 15 U/L (ref 12–78)
ANION GAP SERPL CALC-SCNC: 3 MMOL/L (ref 5–15)
APPEARANCE UR: CLEAR
AST SERPL-CCNC: 9 U/L (ref 15–37)
BACTERIA URNS QL MICRO: NEGATIVE /HPF
BASOPHILS # BLD: 0 K/UL (ref 0–0.1)
BASOPHILS NFR BLD: 0 % (ref 0–1)
BILIRUB SERPL-MCNC: 0.5 MG/DL (ref 0.2–1)
BILIRUB UR QL: NEGATIVE
BUN SERPL-MCNC: 7 MG/DL (ref 6–20)
BUN/CREAT SERPL: 10 (ref 12–20)
CALCIUM SERPL-MCNC: 8.9 MG/DL (ref 8.5–10.1)
CHLORIDE SERPL-SCNC: 106 MMOL/L (ref 97–108)
CO2 SERPL-SCNC: 28 MMOL/L (ref 21–32)
COLOR UR: NORMAL
CREAT SERPL-MCNC: 0.72 MG/DL (ref 0.55–1.02)
DIFFERENTIAL METHOD BLD: NORMAL
EOSINOPHIL # BLD: 0.1 K/UL (ref 0–0.4)
EOSINOPHIL NFR BLD: 1 % (ref 0–7)
EPITH CASTS URNS QL MICRO: NORMAL /LPF
ERYTHROCYTE [DISTWIDTH] IN BLOOD BY AUTOMATED COUNT: 11.8 % (ref 11.5–14.5)
GLOBULIN SER CALC-MCNC: 3.9 G/DL (ref 2–4)
GLUCOSE SERPL-MCNC: 93 MG/DL (ref 65–100)
GLUCOSE UR STRIP.AUTO-MCNC: NEGATIVE MG/DL
HCG UR QL: NEGATIVE
HCT VFR BLD AUTO: 38 % (ref 35–47)
HGB BLD-MCNC: 12.5 G/DL (ref 11.5–16)
HGB UR QL STRIP: NEGATIVE
HYALINE CASTS URNS QL MICRO: NORMAL /LPF (ref 0–5)
IMM GRANULOCYTES # BLD AUTO: 0 K/UL (ref 0–0.04)
IMM GRANULOCYTES NFR BLD AUTO: 0 % (ref 0–0.5)
KETONES UR QL STRIP.AUTO: NEGATIVE MG/DL
LEUKOCYTE ESTERASE UR QL STRIP.AUTO: NEGATIVE
LYMPHOCYTES # BLD: 2.7 K/UL (ref 0.8–3.5)
LYMPHOCYTES NFR BLD: 33 % (ref 12–49)
MCH RBC QN AUTO: 30.6 PG (ref 26–34)
MCHC RBC AUTO-ENTMCNC: 32.9 G/DL (ref 30–36.5)
MCV RBC AUTO: 93.1 FL (ref 80–99)
MONOCYTES # BLD: 0.5 K/UL (ref 0–1)
MONOCYTES NFR BLD: 6 % (ref 5–13)
NEUTS SEG # BLD: 4.8 K/UL (ref 1.8–8)
NEUTS SEG NFR BLD: 60 % (ref 32–75)
NITRITE UR QL STRIP.AUTO: NEGATIVE
NRBC # BLD: 0 K/UL (ref 0–0.01)
NRBC BLD-RTO: 0 PER 100 WBC
PH UR STRIP: 7.5 [PH] (ref 5–8)
PLATELET # BLD AUTO: 272 K/UL (ref 150–400)
PMV BLD AUTO: 10.8 FL (ref 8.9–12.9)
POTASSIUM SERPL-SCNC: 3.6 MMOL/L (ref 3.5–5.1)
PROT SERPL-MCNC: 8.1 G/DL (ref 6.4–8.2)
PROT UR STRIP-MCNC: NEGATIVE MG/DL
RBC # BLD AUTO: 4.08 M/UL (ref 3.8–5.2)
RBC #/AREA URNS HPF: NORMAL /HPF (ref 0–5)
SODIUM SERPL-SCNC: 137 MMOL/L (ref 136–145)
SP GR UR REFRACTOMETRY: 1.01 (ref 1–1.03)
UA: UC IF INDICATED,UAUC: NORMAL
UROBILINOGEN UR QL STRIP.AUTO: 0.2 EU/DL (ref 0.2–1)
WBC # BLD AUTO: 8.2 K/UL (ref 3.6–11)
WBC URNS QL MICRO: NORMAL /HPF (ref 0–4)

## 2021-05-06 PROCEDURE — 74176 CT ABD & PELVIS W/O CONTRAST: CPT

## 2021-05-06 PROCEDURE — 81001 URINALYSIS AUTO W/SCOPE: CPT

## 2021-05-06 PROCEDURE — 36415 COLL VENOUS BLD VENIPUNCTURE: CPT

## 2021-05-06 PROCEDURE — 80053 COMPREHEN METABOLIC PANEL: CPT

## 2021-05-06 PROCEDURE — 74011250637 HC RX REV CODE- 250/637: Performed by: EMERGENCY MEDICINE

## 2021-05-06 PROCEDURE — 81025 URINE PREGNANCY TEST: CPT

## 2021-05-06 PROCEDURE — 85025 COMPLETE CBC W/AUTO DIFF WBC: CPT

## 2021-05-06 PROCEDURE — 99284 EMERGENCY DEPT VISIT MOD MDM: CPT

## 2021-05-06 RX ORDER — DICYCLOMINE HYDROCHLORIDE 10 MG/1
10 CAPSULE ORAL
Qty: 10 CAP | Refills: 0 | Status: SHIPPED | OUTPATIENT
Start: 2021-05-06

## 2021-05-06 RX ORDER — POLYETHYLENE GLYCOL 3350 17 G/17G
17 POWDER, FOR SOLUTION ORAL DAILY
Qty: 1530 G | Refills: 0 | Status: SHIPPED | OUTPATIENT
Start: 2021-05-06

## 2021-05-06 RX ORDER — KETOROLAC TROMETHAMINE 30 MG/ML
15 INJECTION, SOLUTION INTRAMUSCULAR; INTRAVENOUS
Status: DISCONTINUED | OUTPATIENT
Start: 2021-05-06 | End: 2021-05-06 | Stop reason: HOSPADM

## 2021-05-06 RX ORDER — ONDANSETRON 2 MG/ML
4 INJECTION INTRAMUSCULAR; INTRAVENOUS
Status: DISCONTINUED | OUTPATIENT
Start: 2021-05-06 | End: 2021-05-06 | Stop reason: HOSPADM

## 2021-05-06 RX ORDER — ACETAMINOPHEN 325 MG/1
650 TABLET ORAL
Status: COMPLETED | OUTPATIENT
Start: 2021-05-06 | End: 2021-05-06

## 2021-05-06 RX ADMIN — ACETAMINOPHEN 650 MG: 325 TABLET ORAL at 01:33

## 2021-07-18 ENCOUNTER — HOSPITAL ENCOUNTER (EMERGENCY)
Age: 26
Discharge: HOME | End: 2021-07-18
Attending: EMERGENCY MEDICINE
Payer: COMMERCIAL

## 2021-07-18 VITALS
HEIGHT: 62 IN | HEART RATE: 69 BPM | RESPIRATION RATE: 14 BRPM | SYSTOLIC BLOOD PRESSURE: 130 MMHG | DIASTOLIC BLOOD PRESSURE: 87 MMHG | TEMPERATURE: 98 F | BODY MASS INDEX: 23.41 KG/M2 | WEIGHT: 127.21 LBS | OXYGEN SATURATION: 100 %

## 2021-07-18 DIAGNOSIS — B34.9 VIRAL SYNDROME: Primary | ICD-10-CM

## 2021-07-18 LAB
APPEARANCE UR: CLEAR
BACTERIA URNS QL MICRO: NEGATIVE /HPF
BILIRUB UR QL: NEGATIVE
COLOR UR: ABNORMAL
COVID-19 RAPID TEST, COVR: NOT DETECTED
EPITH CASTS URNS QL MICRO: ABNORMAL /LPF
FLUAV AG NPH QL IA: NEGATIVE
FLUBV AG NOSE QL IA: NEGATIVE
GLUCOSE UR STRIP.AUTO-MCNC: NEGATIVE MG/DL
HGB UR QL STRIP: ABNORMAL
HYALINE CASTS URNS QL MICRO: ABNORMAL /LPF (ref 0–5)
KETONES UR QL STRIP.AUTO: NEGATIVE MG/DL
LEUKOCYTE ESTERASE UR QL STRIP.AUTO: NEGATIVE
NITRITE UR QL STRIP.AUTO: NEGATIVE
PH UR STRIP: 6 [PH] (ref 5–8)
PROT UR STRIP-MCNC: NEGATIVE MG/DL
RBC #/AREA URNS HPF: ABNORMAL /HPF (ref 0–5)
SOURCE, COVRS: NORMAL
SP GR UR REFRACTOMETRY: 1 (ref 1–1.03)
UA: UC IF INDICATED,UAUC: ABNORMAL
UROBILINOGEN UR QL STRIP.AUTO: 0.2 EU/DL (ref 0.2–1)
WBC URNS QL MICRO: ABNORMAL /HPF (ref 0–4)

## 2021-07-18 PROCEDURE — 81001 URINALYSIS AUTO W/SCOPE: CPT

## 2021-07-18 PROCEDURE — 74011250637 HC RX REV CODE- 250/637: Performed by: EMERGENCY MEDICINE

## 2021-07-18 PROCEDURE — 99282 EMERGENCY DEPT VISIT SF MDM: CPT

## 2021-07-18 PROCEDURE — 87635 SARS-COV-2 COVID-19 AMP PRB: CPT

## 2021-07-18 PROCEDURE — 87804 INFLUENZA ASSAY W/OPTIC: CPT

## 2021-07-18 RX ORDER — IBUPROFEN 600 MG/1
600 TABLET ORAL
Status: COMPLETED | OUTPATIENT
Start: 2021-07-18 | End: 2021-07-18

## 2021-07-18 RX ADMIN — IBUPROFEN 600 MG: 600 TABLET, FILM COATED ORAL at 04:52

## 2021-07-18 NOTE — Clinical Note
[address]  [facility] EMERGENCY DEPT  [address] [address]  [phone]    Work/School Note    Date: 7/18/2021    To Whom It May concern:    Wes Alfaro was seen and treated today in the emergency room by the following provider(s):  Attending Provider: Donaldo Presley MD.      Wes Alfaro is excused from work/school on 07/18/21 and 07/19/21. She is medically clear to return to work/school on 7/20/2021.        Sincerely,          Jenelle Mancera MD
never used

## 2021-07-18 NOTE — ED PROVIDER NOTES
EMERGENCY DEPARTMENT HISTORY AND PHYSICAL EXAM      Date: 7/18/2021  Patient Name: Vivi Lucas    History of Presenting Illness     Chief Complaint   Patient presents with    Headache    Sore Throat    Chills       History Provided By: Patient    HPI: Vivi Lucas, 23 y.o. female with PMHx significant for GERD, HSV presents to the ED with chief complaint of headache, chills, sore throat, and diarrhea. Symptoms have been going on for the past few days. Patient has taken Tylenol, most recently at about midnight with mild relief. She reports 3-4 episodes of loose stools today. Nausea or vomiting. She denies any cough or shortness of breath. Denies any dysuria, hematuria, urinary frequency. Does report mild lower abdominal pain. She denies any vaginal discharge. States her last menstrual cycle was 4 days ago. She had Covid infection in January 2021 and recovered from that without incident. Reports chills at home, but did not check her temperature. Denies any chest pain, rash. Has been eating and drinking normally. PCP: Sharonda Tarango MD    No current facility-administered medications on file prior to encounter. Current Outpatient Medications on File Prior to Encounter   Medication Sig Dispense Refill    dicyclomine (BENTYL) 10 mg capsule Take 1 Cap by mouth three (3) times daily as needed for Abdominal Cramps. 10 Cap 0    polyethylene glycol (Miralax) 17 gram/dose powder Take 17 g by mouth daily. 1 tablespoon with 8 oz of water daily 1530 g 0    ondansetron (Zofran ODT) 4 mg disintegrating tablet Take 1 Tab by mouth every eight (8) hours as needed for Nausea. 10 Tab 0    ondansetron (ZOFRAN ODT) 4 mg disintegrating tablet Take 1 Tab by mouth every eight (8) hours as needed for Nausea or Vomiting. 20 Tab 0    ascorbic acid, vitamin C, (Vitamin C) 500 mg tablet Take 1 Tab by mouth two (2) times a day. 10 Tab 0    zinc sulfate 220 mg tablet Take 1 Tab by mouth daily.  5 Tab 0    valACYclovir (Valtrex) 1 gram tablet Take 1,000 mg by mouth daily.  acetaminophen (TYLENOL) 325 mg tablet Take 2 Tabs by mouth every six (6) hours as needed for Pain or Fever. 12 Tab 0       Past History     Past Medical History:  Past Medical History:   Diagnosis Date    Chlamydia     Chronic kidney disease     Endocrine disease     hypoglycemia    Generalized anxiety disorder with panic attacks 7/15/2020    GERD (gastroesophageal reflux disease)     Headache     High risk heterosexual behavior 7/15/2020    HSV (herpes simplex virus) anogenital infection 7/13/2020    Hypokalemia     Kidney disease     \"kidney failure a few years ago\"    Psychotic disorder ([facility] [address]     Trichomonal infection        Past Surgical History:  No past surgical history on file. Family History:  Family History   Problem Relation Age of Onset    Migraines Mother     Hypertension Father     Hypertension Maternal Uncle     Diabetes Paternal Aunt     Diabetes Paternal Uncle     Hypertension Paternal Grandfather     Stroke Paternal Grandfather        Social History:  Social History     Tobacco Use    Smoking status: Never Smoker    Smokeless tobacco: Never Used   Substance Use Topics    Alcohol use: No    Drug use: No       Allergies: Allergies   Allergen Reactions    Penicillins Hives    Vancomycin Itching and Other (comments)     Lips went numb, \"swollen throat\" and itchy from her \"scalp to her legs\"         Review of Systems   Review of Systems   Constitutional: Positive for chills and fatigue. HENT: Positive for rhinorrhea and sore throat. Negative for congestion and ear pain. Respiratory: Negative for cough, chest tightness, shortness of breath and wheezing. Cardiovascular: Negative for chest pain and palpitations. Gastrointestinal: Positive for diarrhea. Negative for abdominal pain, nausea and vomiting. Genitourinary: Negative for dysuria, flank pain and hematuria.    Musculoskeletal: Negative for back pain, myalgias and neck pain. Skin: Negative for rash and wound. Neurological: Negative for dizziness, syncope, weakness, light-headedness and headaches. Psychiatric/Behavioral: Negative for confusion. The patient is not nervous/anxious. All other systems reviewed and are negative.         Physical Exam   General appearance - well nourished, well appearing, and in no distress  Eyes - pupils equal and reactive, extraocular eye movements intact  ENT - mucous membranes moist, pharynx mildly erythematous with no tonsillar hypertrophy or exudate  Neck - supple, no significant adenopathy; non-tender to palpation  Chest - clear to auscultation, no wheezes, rales or rhonchi; non-tender to palpation  Heart - normal rate and regular rhythm, S1 and S2 normal, no murmurs noted  Abdomen - soft, nontender, nondistended, no masses or organomegaly  Musculoskeletal - no joint tenderness, deformity or swelling; normal ROM  Extremities - peripheral pulses normal, no pedal edema  Skin - normal coloration and turgor, no rashes  Neurological - alert, oriented x3, normal speech, no focal findings or movement disorder noted    Diagnostic Study Results     Labs -     Recent Results (from the past 24 hour(s))   URINALYSIS W/ REFLEX CULTURE    Collection Time: 07/18/21  4:59 AM    Specimen: Urine   Result Value Ref Range    Color YELLOW/STRAW      Appearance CLEAR CLEAR      Specific gravity 1.005 1.003 - 1.030      pH (UA) 6.0 5.0 - 8.0      Protein Negative NEG mg/dL    Glucose Negative NEG mg/dL    Ketone Negative NEG mg/dL    Bilirubin Negative NEG      Blood SMALL (A) NEG      Urobilinogen 0.2 0.2 - 1.0 EU/dL    Nitrites Negative NEG      Leukocyte Esterase Negative NEG      WBC 0-4 0 - 4 /hpf    RBC 0-5 0 - 5 /hpf    Epithelial cells FEW FEW /lpf    Bacteria Negative NEG /hpf    UA:UC IF INDICATED CULTURE NOT INDICATED BY UA RESULT CNI      Hyaline cast 0-2 0 - 5 /lpf   COVID-19 RAPID TEST    Collection Time: 07/18/21  4:59 AM   Result Value Ref Range    Specimen source Nasopharyngeal      COVID-19 rapid test Not detected NOTD     INFLUENZA A+B VIRAL AGS    Collection Time: 07/18/21  4:59 AM   Result Value Ref Range    Influenza A Antigen Negative NEG      Influenza B Antigen Negative NEG         Radiologic Studies -   No orders to display     CT Results  (Last 48 hours)    None        CXR Results  (Last 48 hours)    None            Medical Decision Making   I am the first provider for this patient. I reviewed the vital signs, available nursing notes, past medical history, past surgical history, family history and social history. Vital Signs-Reviewed the patient's vital signs. Patient Vitals for the past 12 hrs:   Temp Pulse Resp BP SpO2   07/18/21 0320 98 °F (36.7 °C) 69 14 130/87 100 %           Records Reviewed: Nursing Notes and Old Medical Records    Provider Notes (Medical Decision Making):   Differential diagnosis: Viral syndrome, Covid, strep, flu, UTI  We will check Covid, flu, strep test, chest x-ray, urine      ED Course:   Initial assessment performed. The patients presenting problems have been discussed, and they are in agreement with the care plan formulated and outlined with them. I have encouraged them to ask questions as they arise throughout their visit. Progress Notes:   X-ray and lab work have all come back unremarkable. Will send PCR Covid swab. Patient is afebrile in the ED and vital signs are normal.  Suspect viral infection    Disposition:  Discharge home    PLAN:  1. Discharge Medication List as of 7/18/2021  7:18 AM        2.    Follow-up Information     Follow up With Specialties Details Why Contact Info    Julio C Ayala MD [facility] Medicine Schedule an appointment as soon as possible for a visit   [address] [place] [address]  [phone]      [facility] EMERGENCY DEPT Emergency Medicine  If symptoms worsen [address] [place] [id number]  [phone]        Return to ED if worse     Diagnosis     Clinical Impression:   1.  Viral syndrome

## 2021-07-19 ENCOUNTER — PATIENT OUTREACH (OUTPATIENT)
Dept: CASE MANAGEMENT | Age: 26
End: 2021-07-19

## 2021-07-19 NOTE — PROGRESS NOTES
Patient contacted regarding COVID-19 risk. Discussed COVID-19 related testing which was available at this time. Test results were negative. Patient informed of results, if available? yes. LPN Care Coordinator contacted the patient by telephone to perform post discharge assessment. Call within 2 business days of discharge: Yes Verified name and  with patient as identifiers. Provided introduction to self, and explanation of the CTN/ACM role, and reason for call due to risk factors for infection and/or exposure to COVID-19. Symptoms reviewed with patient who verbalized the following symptoms: no worsening symptoms      Due to no new or worsening symptoms encounter was not routed to provider for escalation. Discussed follow-up appointments. If no appointment was previously scheduled, appointment scheduling offered:  no. [id number] follow up appointment(s): No future appointments. Non-[facility] follow up appointment(s): n/a    Interventions to address risk factors: Obtained and reviewed discharge summary and/or continuity of care documents     Advance Care Planning:   Does patient have an Advance Directive: patient declined education. Educated patient about risk for severe COVID-19 due to risk factors according to CDC guidelines. LPN CC reviewed discharge instructions, medical action plan and red flag symptoms with the patient who verbalized understanding. Discussed COVID vaccination status: no. Education provided on COVID-19 vaccination as appropriate. Discussed exposure protocols and quarantine with CDC Guidelines. Patient was given an opportunity to verbalize any questions and concerns and agrees to contact LPN CC or health care provider for questions related to their healthcare. Reviewed and educated patient on any new and changed medications related to discharge diagnosis     Was patient discharged with a pulse oximeter?  no Discussed and confirmed pulse oximeter discharge instructions and when to notify provider or seek emergency care. LPN CC provided contact information. Plan for follow-up call in 14 days based on severity of symptoms and risk factors.

## 2021-08-17 ENCOUNTER — PATIENT OUTREACH (OUTPATIENT)
Dept: CASE MANAGEMENT | Age: 26
End: 2021-08-17

## 2021-08-17 NOTE — PROGRESS NOTES
Patient resolved from Transition of Care episode on 08/17/21. ACM/CTN was unsuccessful at contacting this patient today. Patient/family was provided the following resources and education related to COVID-19 during the initial call:                         Signs, symptoms and red flags related to COVID-19            CDC exposure and quarantine guidelines            [facility] exposure contact - [phone]            Contact for their local Department of Health                 Patient has not had any additional ED or hospital visits. No further outreach scheduled with this CTN/ACM. Episode of Care resolved. Patient has this CTN/ACM contact information if future needs arise.

## 2021-11-23 NOTE — IP AVS SNAPSHOT
Summary of Care Report The Summary of Care report has been created to help improve care coordination. Users with access to [employer] or [employer] (Web-based application) may access additional patient information including the Discharge Summary. If you are not currently a [employer] [facility] user and need more information, please call the number listed below in the [place] [address] section and ask to be connected with Medical Records. Facility Information Name Address Phone [address] [address] [phone] Patient Information Patient Name Sex SONIYA Antonio ([id number]) Female 1995 Discharge Information Admitting Provider Service Area Unit Alex Kapoor MD / [phone] [address] [facility] 3 Mother Infant / [phone] Discharge Provider Discharge Date/Time Discharge Disposition Destination (none) 2018 Morning (Pending) AHR (none) Patient Language Language ENGLISH [13] Hospital Problems as of 2018  Reviewed: 2017  2:36 PM by oFx Pina NP Class Noted - Resolved Last Modified POA Active Problems Pregnant  2018 - Present 2018 by Alex Kapoor MD Unknown Entered by Alex Kapoor MD  
  
Non-Hospital Problems as of 2018  Reviewed: 2017  2:36 PM by Fox Pina NP Class Noted - Resolved Last Modified Active Problems Hypokalemia  10/27/2016 - Present 10/27/2016 by Fox Pina, NP Entered by Fox Pina, NP Hypoglycemia  10/27/2016 - Present 10/27/2016 by Fox Pina, NP Entered by Fox Pina, NP Anxiety  10/27/2016 - Present 10/27/2016 by Fox Pina, NP Entered by Fox Pina, NP   Acute cystitis without hematuria  2016 - Present 2016 by Bandar Sandoval MD  
 Entered by Yin Alvarez MD  
  
You are allergic to the following Allergen Reactions Penicillins Hives Current Discharge Medication List  
  
START taking these medications Dose & Instructions Dispensing Information Comments  
 ibuprofen 800 mg tablet Commonly known as:  MOTRIN Dose:  800 mg Take 1 Tab by mouth every eight (8) hours as needed for Pain. Quantity:  30 Tab Refills:  0  
   
 oxyCODONE-acetaminophen 5-325 mg per tablet Commonly known as:  PERCOCET Dose:  1 Tab Take 1 Tab by mouth every four (4) hours as needed. Max Daily Amount: 6 Tabs. Quantity:  20 Tab Refills:  0 ASK your doctor about these medications Dose & Instructions Dispensing Information Comments FLAGYL 250 mg tablet Generic drug:  metroNIDAZOLE Dose:  500 mg Take 500 mg by mouth three (3) times daily. For bacterial vaginosis, since 12/28/17 Refills:  0  
   
 PNV No12-Iron-FA-DSS-OM-3 29 mg iron-1 mg -50 mg Cpkd Take  by mouth. Refills:  0 Current Immunizations Name Date MMR 2/9/2018 Tdap 2/8/2018 Surgery Information ID Date/Time Status Primary Surgeon All Procedures Location [id number] 2/7/2018 Complete   ZZANESTHESIA MRM - DO NOT SCHEDULE Follow-up Information Follow up With Details Comments Contact Info Nick Freire MD   [address] [place] [address] Road [facility] 
[phone] Discharge Instructions Vaginal Childbirth (Postpartum Care): After Your Visit Your Care Instructions After childbirth (postpartum period), your body goes through many changes. Some of these changes happen over several weeks. It is easy to get too tired and overwhelmed during the first weeks after childbirth. Take it easy on yourself. You may find it hard to meet the extra demands on your energy and time. Get rest whenever you can, accept help from others, and eat well and drink plenty of fluids. Your body will slowly heal in the next few weeks. You may feel emotional during this time. Changes in your hormones can shift your mood without warning. No heavy lifting. No more than 8 lbs or the size of baby for 2-3 weeks. Avoid stairs. Limit to 1-2 times per day for the 1st week after delivery. No driving for the first week after delivery. Follow-up care is a key part of your treatment and safety. Be sure to make and go to all appointments, and call your doctor if you are having problems. Its also a good idea to know your test results and keep a list of the medicines you take. Around 4 to 6 weeks after your baby's birth, you will have a follow-up visit with your doctor. This visit is your time to talk to your doctor about anything you are concerned or curious about. Keep a list of questions to bring to your postpartum visit. Your questions might be about: 
Changes in your breasts, such as lumps or soreness. When to expect your menstrual period to start again. What form of birth control is best for you. Weight you have put on during the pregnancy. Exercise options. What foods and drinks are best for you, especially if you are breast-feeding. Problems you might be having with breast-feeding. When you can have sex. Some women may want to talk about lubricants for the vagina. Any feelings of sadness or restlessness that you are having. How can you care for yourself at home? Vaginal bleeding and cramps After delivery, you will have a bloody discharge from the vagina. This will turn pink within a week and then white or yellow after about 10 days. It may last for 2 to 4 weeks or longer, until the uterus has healed. Do not rinse inside your vagina with fluids (douche). Do not worry if you pass some blood clots, as long as they are smaller than a golf ball. If you have a tear or stitches in your vaginal area, change the pad at least every 4 hours to prevent soreness and infection. You may have cramps for the first few days after childbirth. These are normal and occur as the uterus shrinks to normal size. Take an over-the-counter pain medicine, such as acetaminophen (Tylenol), ibuprofen (Advil, Motrin), or naproxen (Aleve), for cramps. Read and follow all instructions on the label. Do not take aspirin, because it can cause more bleeding. Do not take two or more pain medicines at the same time unless the doctor told you to. Many pain medicines have acetaminophen, which is Tylenol. Too much acetaminophen (Tylenol) can be harmful. Stitches If you have stitches, they will dissolve on their own and do not need to be removed. Follow your doctor's instructions for cleaning the stitched area. Put ice or a cold pack on your painful area for 10 to 20 minutes at a time. , several times a day, for the first few days. Put a thin cloth between the ice and your skin. Sit in a few inches of warm water (sitz bath) 3 times a day and after bowel movements. The warm water helps with pain and itching. If you do not have a tub, a warm shower might help. Keep the area clean by pouring or spraying warm water over the area outside your vagina and anus after you use the toilet. Breast fullness Your breasts may overfill (engorge) in the first few days after delivery. To help milk flow and to relieve pain, warm your breasts in the shower or by using warm, moist towels before nursing. If you are not nursing, do not put warmth on your breasts or touch your breasts. Wear a tight bra or sports bra and use ice until the fullness goes away. This usually takes 2 to 3 days. Put ice or a cold pack on your breast after nursing to reduce swelling and pain. Put a thin cloth between the ice and your skin. Activity Eat a balanced diet. Do not try to lose weight by cutting calories. Keep taking your prenatal vitamins, or take a multivitamin. ·  Get help with household chores from family or friends, if you can.  Do not try to do it all yourself. Get as much rest as you can. Try to take naps when your baby sleeps during the day. Get some exercise every day. But do not do any heavy exercise until your doctor says it is okay. Do not have sex or use tampons until you have stopped bleeding and at least 4 to 6 weeks have passed since you gave birth. Talk to your doctor about birth control. You can get pregnant even before your period returns. Also, you can get pregnant while you are breast-feeding. Mental health It is normal to have some sadness, anxiety, sleeplessness, and mood swings after you go home. If you feel upset or hopeless for more than a few days or are having trouble doing the things you need to do, talk to your doctor. · Many women get the \"baby blues\" during the first few days after childbirth. The \"baby blues\" usually peak around the fourth day and then ease up in less than 2 weeks. If you have the \"baby blues\" for more than a few days, or if you have thoughts of hurting yourself or your baby, call your doctor right away. Constipation and hemorrhoids Drink plenty of fluids, enough so that your urine is light yellow or clear like water. If you have kidney, heart, or liver disease and have to limit fluids, talk with your doctor before you increase the amount of fluids you drink. Eat plenty of fiber each day to avoid constipation. Include foods such as whole-grain breads and cereals, raw vegetables, raw and dried fruits, and beans. · If you have hemorrhoids or swelling or pain around the opening of your vagina, try using cold and heat. You can put ice or a cold pack on the area for 10 to 20 minutes at a time. Put a thin cloth between the ice and your skin. Also try sitting in a few inches of warm water (sitz bath) 3 times a day and after bowel movements. When should you call for help? Call 911 anytime you think you may need emergency care. For example, call if: You are thinking of hurting yourself, your baby, or anyone else. You have sudden, severe pain in your belly. You passed out (lost consciousness). Call your doctor now or seek immediate medical care if: 
You have severe vaginal bleeding. You are passing blood clots and soaking through a pad each hour for 2 or more hours. Your vaginal bleeding seems to be getting heavier or is still bright red 4 days after delivery, or you pass blood clots larger than the size of a golf ball. You are dizzy or lightheaded, or you feel like you may faint. You are vomiting or cannot keep fluids down. You have a fever. You have new or more belly pain. You pass tissue (not just blood). Your vaginal discharge smells bad. Your belly feels tender or full and hard. Your breasts are continuously painful or red. You feel sad, anxious, or hopeless for more than a few days. Watch closely for changes in your health, and be sure to contact your doctor if you have any problems. Where can you learn more? Go to [employer].be Enter E547  in the search box to learn more about \"Postpartum Care: After Your Visit\". or 
 
Go to [employer] Enter A578  in the search box to learn more about \"Vaginal Childbirth: After Your Visit\". © 0412-4512 Healthwise, Incorporated. Care instructions adapted under license by [employer] (which disclaims liability or warranty for this information). This care instruction is for use with your licensed healthcare professional. If you have questions about a medical condition or this instruction, always ask your healthcare professional. Bia Carne any warranty or liability for your use of this information. [employer] Activation Thank you for requesting access to [employer]. Please follow the instructions below to securely access and download your online medical record.  [employer] allows you to send messages to your doctor, view your test results, renew your prescriptions, schedule appointments, and more. How Do I Sign Up? 1. In your internet browser, go to [email or web address]. 2. Click on the First Time User? Click Here link in the Sign In box. You will see the New Member Sign Up page. 3. Enter your [employer] Access Code exactly as it appears below. You will not need to use this code after youve completed the sign-up process. If you do not sign up before the expiration date, you must request a new code. [employer] Access Code: Activation code not generated Current [employer] Status: Active (This is the date your [employer] access code will ) 4. Enter the last four digits of your Social Security Number (xxxx) and Date of Birth (mm/dd/yyyy) as indicated and click Submit. You will be taken to the next sign-up page. 5. Create a [employer] ID. This will be your [employer] login ID and cannot be changed, so think of one that is secure and easy to remember. 6. Create a [employer] password. You can change your password at any time. 7. Enter your Password Reset Question and Answer. This can be used at a later time if you forget your password. 8. Enter your e-mail address. You will receive e-mail notification when new information is available in [address] 9. Click Sign Up. You can now view and download portions of your medical record. 10. Click the Download Summary menu link to download a portable copy of your medical information. Additional Information If you have questions, please visit the Frequently Asked Questions section of the [employer] website at [email or web address] Remember, [employer] is NOT to be used for urgent needs. For medical emergencies, dial 911. Chart Review Routing History Recipient Method Report Sent By Altagracia Carey MD  
Phone: [phone] In [employer] Routed [address] [place] MD [[id number]] 12/2/2016  8:25 PM 12/02/2016 Mary Billy MD  
Fax: [phone] Phone: [phone] Fax Carolina Reich MD NOTES AUTO ROUTING REPORT Erica [facility] MD Ghulam [[id number]] 12/2/2016  8:25 PM 12/02/2016 Coral Urbano MD  
Phone: [phone] In Basket IP Auto Routed Notes Shivani Julio MD [[id number]] 12/3/2016  1:56 PM 12/03/2016 Coral Urbano MD  
Phone: [phone] In Basket IP Auto Routed Notes Beti Gaspar MD [[id number]] 11/11/2017  9:39 PM 11/11/2017 Coral Urbano MD  
Phone: [phone] In Basket IP Auto Routed Notes Kimberly Lyman MD [[id number]] 2/6/2018  4:32 PM 02/06/2018 Health Care Proxy (HCP)

## 2022-01-30 ENCOUNTER — HOSPITAL ENCOUNTER (EMERGENCY)
Age: 27
Discharge: HOME | End: 2022-01-30
Attending: EMERGENCY MEDICINE
Payer: COMMERCIAL

## 2022-01-30 VITALS
TEMPERATURE: 98.6 F | HEIGHT: 62 IN | OXYGEN SATURATION: 100 % | HEART RATE: 78 BPM | RESPIRATION RATE: 16 BRPM | BODY MASS INDEX: 21.95 KG/M2 | DIASTOLIC BLOOD PRESSURE: 59 MMHG | WEIGHT: 119.27 LBS | SYSTOLIC BLOOD PRESSURE: 117 MMHG

## 2022-01-30 DIAGNOSIS — T51.91XA UNINTENTIONAL POISONING BY ALCOHOL, INITIAL ENCOUNTER: Primary | ICD-10-CM

## 2022-01-30 LAB
ALBUMIN SERPL-MCNC: 4.1 G/DL (ref 3.5–5)
ALBUMIN/GLOB SERPL: 0.9 {RATIO} (ref 1.1–2.2)
ALP SERPL-CCNC: 38 U/L (ref 45–117)
ALT SERPL-CCNC: 17 U/L (ref 12–78)
ANION GAP SERPL CALC-SCNC: 7 MMOL/L (ref 5–15)
AST SERPL-CCNC: 12 U/L (ref 15–37)
BASOPHILS # BLD: 0 K/UL (ref 0–0.1)
BASOPHILS NFR BLD: 0 % (ref 0–1)
BILIRUB SERPL-MCNC: 0.8 MG/DL (ref 0.2–1)
BUN SERPL-MCNC: 11 MG/DL (ref 6–20)
BUN/CREAT SERPL: 14 (ref 12–20)
CALCIUM SERPL-MCNC: 9.5 MG/DL (ref 8.5–10.1)
CHLORIDE SERPL-SCNC: 108 MMOL/L (ref 97–108)
CO2 SERPL-SCNC: 23 MMOL/L (ref 21–32)
CREAT SERPL-MCNC: 0.8 MG/DL (ref 0.55–1.02)
DIFFERENTIAL METHOD BLD: ABNORMAL
EOSINOPHIL # BLD: 0 K/UL (ref 0–0.4)
EOSINOPHIL NFR BLD: 0 % (ref 0–7)
ERYTHROCYTE [DISTWIDTH] IN BLOOD BY AUTOMATED COUNT: 11.9 % (ref 11.5–14.5)
GLOBULIN SER CALC-MCNC: 4.6 G/DL (ref 2–4)
GLUCOSE SERPL-MCNC: 84 MG/DL (ref 65–100)
HCT VFR BLD AUTO: 37 % (ref 35–47)
HGB BLD-MCNC: 12.3 G/DL (ref 11.5–16)
IMM GRANULOCYTES # BLD AUTO: 0 K/UL (ref 0–0.04)
IMM GRANULOCYTES NFR BLD AUTO: 0 % (ref 0–0.5)
LIPASE SERPL-CCNC: 61 U/L (ref 73–393)
LYMPHOCYTES # BLD: 1.7 K/UL (ref 0.8–3.5)
LYMPHOCYTES NFR BLD: 13 % (ref 12–49)
MCH RBC QN AUTO: 30.1 PG (ref 26–34)
MCHC RBC AUTO-ENTMCNC: 33.2 G/DL (ref 30–36.5)
MCV RBC AUTO: 90.5 FL (ref 80–99)
MONOCYTES # BLD: 0.4 K/UL (ref 0–1)
MONOCYTES NFR BLD: 3 % (ref 5–13)
NEUTS SEG # BLD: 11.2 K/UL (ref 1.8–8)
NEUTS SEG NFR BLD: 84 % (ref 32–75)
NRBC # BLD: 0 K/UL (ref 0–0.01)
NRBC BLD-RTO: 0 PER 100 WBC
PLATELET # BLD AUTO: 299 K/UL (ref 150–400)
PMV BLD AUTO: 10.7 FL (ref 8.9–12.9)
POTASSIUM SERPL-SCNC: 4 MMOL/L (ref 3.5–5.1)
PROT SERPL-MCNC: 8.7 G/DL (ref 6.4–8.2)
RBC # BLD AUTO: 4.09 M/UL (ref 3.8–5.2)
SODIUM SERPL-SCNC: 138 MMOL/L (ref 136–145)
WBC # BLD AUTO: 13.3 K/UL (ref 3.6–11)

## 2022-01-30 PROCEDURE — 85025 COMPLETE CBC W/AUTO DIFF WBC: CPT

## 2022-01-30 PROCEDURE — 99282 EMERGENCY DEPT VISIT SF MDM: CPT

## 2022-01-30 PROCEDURE — 80053 COMPREHEN METABOLIC PANEL: CPT

## 2022-01-30 PROCEDURE — 36415 COLL VENOUS BLD VENIPUNCTURE: CPT

## 2022-01-30 PROCEDURE — 83690 ASSAY OF LIPASE: CPT

## 2022-01-30 PROCEDURE — 96361 HYDRATE IV INFUSION ADD-ON: CPT

## 2022-01-30 PROCEDURE — 96374 THER/PROPH/DIAG INJ IV PUSH: CPT

## 2022-01-30 PROCEDURE — 74011250636 HC RX REV CODE- 250/636: Performed by: EMERGENCY MEDICINE

## 2022-01-30 RX ORDER — ONDANSETRON 4 MG/1
4 TABLET, FILM COATED ORAL
Qty: 20 TABLET | Refills: 0 | Status: SHIPPED | OUTPATIENT
Start: 2022-01-30 | End: 2022-03-08 | Stop reason: ALTCHOICE

## 2022-01-30 RX ORDER — ONDANSETRON 2 MG/ML
4 INJECTION INTRAMUSCULAR; INTRAVENOUS
Status: COMPLETED | OUTPATIENT
Start: 2022-01-30 | End: 2022-01-30

## 2022-01-30 RX ADMIN — ONDANSETRON 4 MG: 2 INJECTION INTRAMUSCULAR; INTRAVENOUS at 14:03

## 2022-01-30 RX ADMIN — SODIUM CHLORIDE 1000 ML: 9 INJECTION, SOLUTION INTRAVENOUS at 14:02

## 2022-01-30 NOTE — ED PROVIDER NOTES
EMERGENCY DEPARTMENT HISTORY AND PHYSICAL EXAM      Date: 1/30/2022  Patient Name: Merline Cavalier    History of Presenting Illness     Chief Complaint   Patient presents with    Vomiting     drank alot of alcohol last night for her birthday; feels dehydrated still vomiting can't keep anything down. hurts on her body in upper chest back shoulders ; denies injury       History Provided By: Patient    HPI: Merline Cavalier, 33 y.o. female with PMHx significant for GERD, HSV, who presents with nausea and vomiting the setting of heavy alcohol use last night. Patient states that she drank a lot of alcohol last night as it was her birthday. And have nausea vomiting last night is persisted today. Has been unable to keep anything down. No medications at home for symptoms. Reports associated headache as well as ache in her stomach. No diarrhea or urinary symptoms      PCP: Shivam Alba MD    There are no other complaints, changes, or physical findings at this time. Current Outpatient Medications   Medication Sig Dispense Refill    ondansetron hcl (Zofran) 4 mg tablet Take 1 Tablet by mouth every eight (8) hours as needed for Nausea. 20 Tablet 0    dicyclomine (BENTYL) 10 mg capsule Take 1 Cap by mouth three (3) times daily as needed for Abdominal Cramps. 10 Cap 0    polyethylene glycol (Miralax) 17 gram/dose powder Take 17 g by mouth daily. 1 tablespoon with 8 oz of water daily 1530 g 0    ondansetron (Zofran ODT) 4 mg disintegrating tablet Take 1 Tab by mouth every eight (8) hours as needed for Nausea. 10 Tab 0    ondansetron (ZOFRAN ODT) 4 mg disintegrating tablet Take 1 Tab by mouth every eight (8) hours as needed for Nausea or Vomiting. 20 Tab 0    ascorbic acid, vitamin C, (Vitamin C) 500 mg tablet Take 1 Tab by mouth two (2) times a day. 10 Tab 0    zinc sulfate 220 mg tablet Take 1 Tab by mouth daily. 5 Tab 0    valACYclovir (Valtrex) 1 gram tablet Take 1,000 mg by mouth daily.       acetaminophen (TYLENOL) 325 mg tablet Take 2 Tabs by mouth every six (6) hours as needed for Pain or Fever. 12 Tab 0     Past History     Past Medical History:  Past Medical History:   Diagnosis Date    Chlamydia     Chronic kidney disease     Endocrine disease     hypoglycemia    Generalized anxiety disorder with panic attacks 7/15/2020    GERD (gastroesophageal reflux disease)     Headache     High risk heterosexual behavior 7/15/2020    HSV (herpes simplex virus) anogenital infection 7/13/2020    Hypokalemia     Kidney disease     \"kidney failure a few years ago\"    Psychotic disorder ([facility] [address]     Trichomonal infection      Past Surgical History:  No past surgical history on file. Family History:  Family History   Problem Relation Age of Onset    Migraines Mother     Hypertension Father     Hypertension Maternal Uncle     Diabetes Paternal Aunt     Diabetes Paternal Uncle     Hypertension Paternal Grandfather     Stroke Paternal Grandfather      Social History:  Social History     Tobacco Use    Smoking status: Never Smoker    Smokeless tobacco: Never Used   Substance Use Topics    Alcohol use: No    Drug use: No     Allergies: Allergies   Allergen Reactions    Penicillins Hives    Vancomycin Itching and Other (comments)     Lips went numb, \"swollen throat\" and itchy from her \"scalp to her legs\"     Review of Systems   Review of Systems   Constitutional: Negative for chills and fever. HENT: Negative for congestion, rhinorrhea and sore throat. Respiratory: Negative for cough and shortness of breath. Cardiovascular: Negative for chest pain. Gastrointestinal: Positive for abdominal pain, nausea and vomiting. Genitourinary: Negative for dysuria and urgency. Skin: Negative for rash. Neurological: Negative for dizziness, light-headedness and headaches. All other systems reviewed and are negative. Physical Exam   Physical Exam  Vitals and nursing note reviewed. Constitutional:       General: She is not in acute distress. Appearance: She is well-developed. HENT:      Head: Normocephalic and atraumatic. Eyes:      Conjunctiva/sclera: Conjunctivae normal.      Pupils: Pupils are equal, round, and reactive to light. Cardiovascular:      Rate and Rhythm: Normal rate and regular rhythm. Pulmonary:      Effort: Pulmonary effort is normal. No respiratory distress. Breath sounds: Normal breath sounds. No stridor. Abdominal:      General: There is no distension. Palpations: Abdomen is soft. Tenderness: There is abdominal tenderness in the epigastric area. Musculoskeletal:         General: Normal range of motion. Cervical back: Normal range of motion. Skin:     General: Skin is warm and dry. Neurological:      Mental Status: She is alert and oriented to person, place, and time. Diagnostic Study Results   Labs -     Recent Results (from the past 12 hour(s))   CBC WITH AUTOMATED DIFF    Collection Time: 01/30/22  1:35 PM   Result Value Ref Range    WBC 13.3 (H) 3.6 - 11.0 K/uL    RBC 4.09 3.80 - 5.20 M/uL    HGB 12.3 11.5 - 16.0 g/dL    HCT 37.0 35.0 - 47.0 %    MCV 90.5 80.0 - 99.0 FL    MCH 30.1 26.0 - 34.0 PG    MCHC 33.2 30.0 - 36.5 g/dL    RDW 11.9 11.5 - 14.5 %    PLATELET 038 533 - 716 K/uL    MPV 10.7 8.9 - 12.9 FL    NRBC 0.0 0  WBC    ABSOLUTE NRBC 0.00 0.00 - 0.01 K/uL    NEUTROPHILS 84 (H) 32 - 75 %    LYMPHOCYTES 13 12 - 49 %    MONOCYTES 3 (L) 5 - 13 %    EOSINOPHILS 0 0 - 7 %    BASOPHILS 0 0 - 1 %    IMMATURE GRANULOCYTES 0 0.0 - 0.5 %    ABS. NEUTROPHILS 11.2 (H) 1.8 - 8.0 K/UL    ABS. LYMPHOCYTES 1.7 0.8 - 3.5 K/UL    ABS. MONOCYTES 0.4 0.0 - 1.0 K/UL    ABS. EOSINOPHILS 0.0 0.0 - 0.4 K/UL    ABS. BASOPHILS 0.0 0.0 - 0.1 K/UL    ABS. IMM.  GRANS. 0.0 0.00 - 0.04 K/UL    DF AUTOMATED     METABOLIC PANEL, COMPREHENSIVE    Collection Time: 01/30/22  1:35 PM   Result Value Ref Range    Sodium 138 136 - 145 mmol/L Potassium 4.0 3.5 - 5.1 mmol/L    Chloride 108 97 - 108 mmol/L    CO2 23 21 - 32 mmol/L    Anion gap 7 5 - 15 mmol/L    Glucose 84 65 - 100 mg/dL    BUN 11 6 - 20 MG/DL    Creatinine 0.80 0.55 - 1.02 MG/DL    BUN/Creatinine ratio 14 12 - 20      GFR est AA >60 >60 ml/min/1.73m2    GFR est non-AA >60 >60 ml/min/1.73m2    Calcium 9.5 8.5 - 10.1 MG/DL    Bilirubin, total 0.8 0.2 - 1.0 MG/DL    ALT (SGPT) 17 12 - 78 U/L    AST (SGOT) 12 (L) 15 - 37 U/L    Alk. phosphatase 38 (L) 45 - 117 U/L    Protein, total 8.7 (H) 6.4 - 8.2 g/dL    Albumin 4.1 3.5 - 5.0 g/dL    Globulin 4.6 (H) 2.0 - 4.0 g/dL    A-G Ratio 0.9 (L) 1.1 - 2.2     LIPASE    Collection Time: 01/30/22  1:35 PM   Result Value Ref Range    Lipase 61 (L) 73 - 393 U/L       Radiologic Studies -   No orders to display     No results found. Medical Decision Making   I am the first provider for this patient. I reviewed the vital signs, available nursing notes, past medical history, past surgical history, family history and social history. Vital Signs-Reviewed the patient's vital signs. Patient Vitals for the past 12 hrs:   Temp Pulse Resp BP SpO2   01/30/22 1332 98.6 °F (37 °C) 78 16 (!) 117/59 100 %       Pulse Oximetry Analysis - 100% on ra      Records Reviewed: Nursing Notes and Old Medical Records    Provider Notes (Medical Decision Making):   Patient presents with nausea vomiting and abdominal pain after drinking a significant amount of alcohol last night. On exam she has minimal tenderness in epigastric region. No right upper or right lower quadrant tenderness to suggest surgical process. Do not feel imaging is needed. Will send basic lab work Trillat significant electrolyte imbalance, give fluids and nausea medication reevaluate. ED Course:   Initial assessment performed. The patients presenting problems have been discussed, and they are in agreement with the care plan formulated and outlined with them.   I have encouraged them to ask questions as they arise throughout their visit. Labs unremarkable. Patient feeling improved. Tolerating p.o. Stable for discharge. Procedures:  Procedures    Critical Care:  none    Disposition:  Discharge Note:  The patient has been re-evaluated and is ready for discharge. Reviewed available results with patient. Counseled patient on diagnosis and care plan. Patient has expressed understanding, and all questions have been answered. Patient agrees with plan and agrees to follow up as recommended, or to return to the ED if their symptoms worsen. Discharge instructions have been provided and explained to the patient, along with reasons to return to the ED. PLAN:  1. Discharge Medication List as of 1/30/2022  4:25 PM      START taking these medications    Details   ondansetron hcl (Zofran) 4 mg tablet Take 1 Tablet by mouth every eight (8) hours as needed for Nausea., Normal, Disp-20 Tablet, R-0         CONTINUE these medications which have NOT CHANGED    Details   dicyclomine (BENTYL) 10 mg capsule Take 1 Cap by mouth three (3) times daily as needed for Abdominal Cramps., Normal, Disp-10 Cap, R-0      polyethylene glycol (Miralax) 17 gram/dose powder Take 17 g by mouth daily. 1 tablespoon with 8 oz of water daily, Normal, Disp-1530 g, R-0      !! ondansetron (Zofran ODT) 4 mg disintegrating tablet Take 1 Tab by mouth every eight (8) hours as needed for Nausea., Normal, Disp-10 Tab, R-0      !! ondansetron (ZOFRAN ODT) 4 mg disintegrating tablet Take 1 Tab by mouth every eight (8) hours as needed for Nausea or Vomiting., Normal, Disp-20 Tab,R-0      ascorbic acid, vitamin C, (Vitamin C) 500 mg tablet Take 1 Tab by mouth two (2) times a day., Normal, Disp-10 Tab,R-0      zinc sulfate 220 mg tablet Take 1 Tab by mouth daily. , Normal, Disp-5 Tab,R-0      valACYclovir (Valtrex) 1 gram tablet Take 1,000 mg by mouth daily. , Historical Med      acetaminophen (TYLENOL) 325 mg tablet Take 2 Tabs by mouth every six (6) hours as needed for Pain or Fever., No Print, Disp-12 Tab,R-0       !! - Potential duplicate medications found. Please discuss with provider. 2.   Follow-up Information     Follow up With Specialties Details Why Contact Info    Doris Morse MD [facility] Schedule an appointment as soon as possible for a visit   [address]  [address]  [place]  [phone]      [facility] EMERGENCY DEPT Emergency Medicine  As needed, If symptoms worsen [address]  [address] [facility]  [phone]        Return to ED if worse     Diagnosis     Clinical Impression:   1. Unintentional poisoning by alcohol, initial encounter            Please note that this dictation was completed with [employer], the computer voice recognition software. Quite often unanticipated grammatical, syntax, homophones, and other interpretive errors are inadvertently transcribed by the computer software. Please disregard these errors.   Please excuse any errors that have escaped final proofreading

## 2022-03-08 ENCOUNTER — VIRTUAL VISIT (OUTPATIENT)
Dept: FAMILY MEDICINE CLINIC | Age: 27
End: 2022-03-08
Payer: COMMERCIAL

## 2022-03-08 DIAGNOSIS — Z71.89 EDUCATED ABOUT COVID-19 VIRUS INFECTION: Primary | ICD-10-CM

## 2022-03-08 DIAGNOSIS — Z28.9 COVID-19 VACCINATION NOT DONE: ICD-10-CM

## 2022-03-08 PROCEDURE — 99213 OFFICE O/P EST LOW 20 MIN: CPT | Performed by: FAMILY MEDICINE

## 2022-03-08 RX ORDER — NORGESTIMATE AND ETHINYL ESTRADIOL 0.25-0.035
KIT ORAL
COMMUNITY
Start: 2022-03-07

## 2022-03-08 RX ORDER — VALACYCLOVIR HYDROCHLORIDE 500 MG/1
500 TABLET, FILM COATED ORAL DAILY
COMMUNITY
Start: 2022-03-03

## 2022-03-08 NOTE — PROGRESS NOTES
Consent: Cathy Granda, who was seen by synchronous (real-time) audio-video technology, and/or her healthcare decision maker, is aware that this patient-initiated, Telehealth encounter on 3/8/2022 is a billable service, with coverage as determined by her insurance carrier. She is aware that she may receive a bill and has provided verbal consent to proceed: Yes. Cathy Granda is a 33 y.o. female    Met this pt once for hospital discharge 07/2020    Refuses COVID vaccine       has a past medical history of Chlamydia, Chronic kidney disease, Endocrine disease, Generalized anxiety disorder with panic attacks (7/15/2020), GERD (gastroesophageal reflux disease), Headache, High risk heterosexual behavior (7/15/2020), HSV (herpes simplex virus) anogenital infection (7/13/2020), Hypokalemia, Kidney disease, Psychotic disorder ([facility] [address] and Trichomonal infection. Her work is mandating COVID vaccine, she's been reluctant so far. We discussed and educate about COVID vaccine. Says she'll get them done. We discussed her Multiple ED visits, that she can see us for some of them. Reviewed: active problem list, medication list, allergies, notes from last encounter, lab results    A comprehensive review of systems was negative except for that written in the HPI. Assessment & Plan:   Diagnoses and all orders for this visit:    1. Educated about COVID-19 virus infection    2. COVID-19 vaccination not done      Follow-up and Dispositions    · Return in about 3 weeks (around 3/29/2022) for annual exam.       712  Subjective:   Cathy Granda is a 33 y.o. female who was seen for Immunization/Injection (question about covid vac )      Prior to Admission medications    Medication Sig Start Date End Date Taking? Authorizing Provider   norgestimate-ethinyl estradioL (ORTHO-CYCLEN, [address] [facility]) 0.25-35 mg-mcg tab  3/7/22  Yes Provider, Historical   valACYclovir (VALTREX) 500 mg tablet Take 500 mg by mouth daily. 3/3/22  Yes Provider, Historical   polyethylene glycol (Miralax) 17 gram/dose powder Take 17 g by mouth daily. 1 tablespoon with 8 oz of water daily 5/6/21  Yes Qasim New MD   acetaminophen (TYLENOL) 325 mg tablet Take 2 Tabs by mouth every six (6) hours as needed for Pain or Fever. 7/13/20  Yes Bre Zhao MD   dicyclomine (BENTYL) 10 mg capsule Take 1 Cap by mouth three (3) times daily as needed for Abdominal Cramps. Patient not taking: Reported on 3/8/2022 5/6/21   Qasim New MD   zinc sulfate 220 mg tablet Take 1 Tab by mouth daily. Patient not taking: Reported on 3/8/2022 12/4/20   Stoney Thomas MD     Allergies   Allergen Reactions    Penicillins Hives    Vancomycin Itching and Other (comments)     Lips went numb, \"swollen throat\" and itchy from her \"scalp to her legs\"         Objective:   Vital Signs: (As obtained by patient/caregiver at home)  There were no vitals taken for this visit.      Constitutional: [x] Appears well-developed and well-nourished [x] No apparent distress        Mental status: [x] Alert and awake  [x] Oriented to person/place/time [x] Able to follow commands      Eyes:   EOM    [x]  Normal      Sclera  [x]  Normal              Discharge [x]  None visible       HENT: [x] Normocephalic, atraumatic    [] Mouth/Throat: Mucous membranes are moist    External Ears [x] Normal      Neck: [x] No visualized mass     Pulmonary/Chest: [x] Respiratory effort normal   [x] No visualized signs of difficulty breathing or respiratory distress           Musculoskeletal:   [x] Normal gait with no signs of ataxia         [x] Normal range of motion of neck         Neurological:        [x] No Facial Asymmetry (Cranial nerve 7 motor function) (limited exam due to video visit)          [x] No gaze palsy              Skin:        [x] No significant exanthematous lesions or discoloration noted on facial skin                  Psychiatric:       [x] Normal Affect [] Abnormal -        [x] No Hallucinations      We discussed the expected course, resolution and complications of the diagnosis(es) in detail. Medication risks, benefits, costs, interactions, and alternatives were discussed as indicated. I advised her to contact the office if her condition worsens, changes or fails to improve as anticipated. She expressed understanding with the diagnosis(es) and plan. Jesus Green is a 33 y.o. female being evaluated by a video visit encounter for concerns as above. A caregiver was present when appropriate. Due to this being a TeleHealth encounter (During [facility] public health emergency), evaluation of the following organ systems was limited: Vitals/Constitutional/EENT/Resp/CV/GI//MS/Neuro/Skin/Heme-Lymph-Imm. Pursuant to the emergency declaration under the [facility] waiver authority and the [employer] Act, this Virtual  Visit was conducted, with patient's (and/or legal guardian's) consent, to reduce the patient's risk of exposure to COVID-19 and provide necessary medical care. Services were provided through a video synchronous discussion virtually to substitute for in-person clinic visit. Patient and provider were located at their individual homes.         Coby Rogel MD

## 2022-03-18 PROBLEM — Z72.51 HIGH RISK HETEROSEXUAL BEHAVIOR: Status: ACTIVE | Noted: 2020-07-15

## 2022-03-19 PROBLEM — F41.1 GENERALIZED ANXIETY DISORDER WITH PANIC ATTACKS: Status: ACTIVE | Noted: 2020-07-15

## 2022-03-19 PROBLEM — F41.0 GENERALIZED ANXIETY DISORDER WITH PANIC ATTACKS: Status: ACTIVE | Noted: 2020-07-15

## 2022-03-19 PROBLEM — A60.9 HSV (HERPES SIMPLEX VIRUS) ANOGENITAL INFECTION: Status: ACTIVE | Noted: 2020-07-13

## 2022-03-19 PROBLEM — B00.3: Status: ACTIVE | Noted: 2020-07-13

## 2022-11-22 NOTE — DISCHARGE INSTRUCTIONS
You were evaluated in the emergency department for body aches, nausea, and diarrhea after being exposed to COVID 19. Your examination was reassuring as was your work-up including chest xray. It will be important for you to follow-up with your primary care physician in 5-7 days. If you develop worsening symptoms such as shortness of breath, please return to the emergency department immediately.
Plan: Patient declined fluocinonide solution. Patient states her hair spray may be irritating her.
Detail Level: Zone